# Patient Record
Sex: FEMALE | Race: WHITE | Employment: OTHER | URBAN - METROPOLITAN AREA
[De-identification: names, ages, dates, MRNs, and addresses within clinical notes are randomized per-mention and may not be internally consistent; named-entity substitution may affect disease eponyms.]

---

## 2017-06-17 ENCOUNTER — HOSPITAL ENCOUNTER (OUTPATIENT)
Dept: INFUSION THERAPY | Age: 67
Discharge: HOME | End: 2017-06-17
Attending: FAMILY MEDICINE | Admitting: FAMILY MEDICINE

## 2017-06-17 VITALS
HEART RATE: 82 BPM | DIASTOLIC BLOOD PRESSURE: 72 MMHG | RESPIRATION RATE: 16 BRPM | TEMPERATURE: 98.6 F | SYSTOLIC BLOOD PRESSURE: 125 MMHG

## 2017-06-17 LAB
BASOPHILS ABSOLUTE: 0.1 K/CU MM
BASOPHILS RELATIVE PERCENT: 1.4 % (ref 0–1)
DIFFERENTIAL TYPE: ABNORMAL
EOSINOPHILS ABSOLUTE: 0.2 K/CU MM
EOSINOPHILS RELATIVE PERCENT: 3.2 % (ref 0–3)
HCT VFR BLD CALC: 32.3 % (ref 37–47)
HEMOGLOBIN: 9.2 GM/DL (ref 12.5–16)
IMMATURE NEUTROPHIL %: 0.4 % (ref 0–0.43)
LYMPHOCYTES ABSOLUTE: 1.7 K/CU MM
LYMPHOCYTES RELATIVE PERCENT: 23.2 % (ref 24–44)
MCH RBC QN AUTO: 19.5 PG (ref 27–31)
MCHC RBC AUTO-ENTMCNC: 28.5 % (ref 32–36)
MCV RBC AUTO: 68.6 FL (ref 78–100)
MONOCYTES ABSOLUTE: 0.7 K/CU MM
MONOCYTES RELATIVE PERCENT: 9.4 % (ref 0–4)
NUCLEATED RBC %: 0 %
PDW BLD-RTO: 26.3 % (ref 11.7–14.9)
PLATELET # BLD: 316 K/CU MM (ref 140–440)
RBC # BLD: 4.71 M/CU MM (ref 4.2–5.4)
SEGMENTED NEUTROPHILS ABSOLUTE COUNT: 4.4 K/CU MM
SEGMENTED NEUTROPHILS RELATIVE PERCENT: 62.4 % (ref 36–66)
TOTAL IMMATURE NEUTOROPHIL: 0.03 K/CU MM
TOTAL NUCLEATED RBC: 0 K/CU MM
WBC # BLD: 7.1 K/CU MM (ref 4–10.5)

## 2017-06-17 RX ORDER — FUROSEMIDE 20 MG/1
20 TABLET ORAL PRN
COMMUNITY
End: 2022-03-18

## 2017-06-17 RX ORDER — GABAPENTIN 300 MG/1
300 CAPSULE ORAL 3 TIMES DAILY
COMMUNITY

## 2017-06-17 RX ORDER — 0.9 % SODIUM CHLORIDE 0.9 %
250 INTRAVENOUS SOLUTION INTRAVENOUS ONCE
Status: DISCONTINUED | OUTPATIENT
Start: 2017-06-17 | End: 2017-06-18 | Stop reason: HOSPADM

## 2017-06-17 RX ORDER — SIMVASTATIN 40 MG
40 TABLET ORAL NIGHTLY
COMMUNITY

## 2017-06-17 ASSESSMENT — PAIN SCALES - GENERAL: PAINLEVEL_OUTOF10: 0

## 2018-02-08 ENCOUNTER — HOSPITAL ENCOUNTER (OUTPATIENT)
Dept: OTHER | Age: 68
Discharge: HOME | End: 2018-02-08
Attending: INTERNAL MEDICINE | Admitting: INTERNAL MEDICINE

## 2018-02-08 LAB
FERRITIN: 38 NG/ML (ref 15–150)
IRON: 29 UG/DL (ref 37–145)
PCT TRANSFERRIN: 9 % (ref 10–44)
TOTAL IRON BINDING CAPACITY: 323 UG/DL (ref 250–450)
UNSATURATED IRON BINDING CAPACITY: 294 UG/DL (ref 110–370)

## 2018-06-11 ENCOUNTER — HOSPITAL ENCOUNTER (OUTPATIENT)
Dept: OTHER | Age: 68
Discharge: HOME | End: 2018-06-11
Attending: INTERNAL MEDICINE | Admitting: INTERNAL MEDICINE

## 2018-06-11 LAB
FERRITIN: 35 NG/ML (ref 15–150)
IRON: 61 UG/DL (ref 37–145)
PCT TRANSFERRIN: 22 % (ref 10–44)
TOTAL IRON BINDING CAPACITY: 275 UG/DL (ref 250–450)
UNSATURATED IRON BINDING CAPACITY: 214 UG/DL (ref 110–370)
VITAMIN B-12: 225.2 PG/ML (ref 211–911)

## 2019-04-02 ENCOUNTER — HOSPITAL ENCOUNTER (OUTPATIENT)
Dept: INFUSION THERAPY | Age: 69
Setting detail: INFUSION SERIES
Discharge: HOME | End: 2019-04-02
Payer: MEDICARE

## 2019-04-02 VITALS
RESPIRATION RATE: 16 BRPM | HEIGHT: 65 IN | BODY MASS INDEX: 34.99 KG/M2 | WEIGHT: 210 LBS | TEMPERATURE: 98.2 F | SYSTOLIC BLOOD PRESSURE: 139 MMHG | OXYGEN SATURATION: 93 % | HEART RATE: 80 BPM | DIASTOLIC BLOOD PRESSURE: 76 MMHG

## 2019-04-02 PROCEDURE — 86922 COMPATIBILITY TEST ANTIGLOB: CPT

## 2019-04-02 PROCEDURE — 86850 RBC ANTIBODY SCREEN: CPT

## 2019-04-02 PROCEDURE — 36430 TRANSFUSION BLD/BLD COMPNT: CPT

## 2019-04-02 PROCEDURE — 86901 BLOOD TYPING SEROLOGIC RH(D): CPT

## 2019-04-02 PROCEDURE — 2580000003 HC RX 258: Performed by: FAMILY MEDICINE

## 2019-04-02 PROCEDURE — 86900 BLOOD TYPING SEROLOGIC ABO: CPT

## 2019-04-02 PROCEDURE — P9016 RBC LEUKOCYTES REDUCED: HCPCS

## 2019-04-02 PROCEDURE — 99211 OFF/OP EST MAY X REQ PHY/QHP: CPT

## 2019-04-02 RX ORDER — 0.9 % SODIUM CHLORIDE 0.9 %
250 INTRAVENOUS SOLUTION INTRAVENOUS ONCE
Status: COMPLETED | OUTPATIENT
Start: 2019-04-02 | End: 2019-04-02

## 2019-04-02 RX ADMIN — SODIUM CHLORIDE 250 ML: 9 INJECTION, SOLUTION INTRAVENOUS at 10:00

## 2019-04-02 NOTE — DISCHARGE SUMMARY
Tolerated Transfusion well. Discharge instructions explained written copy given. Understanding verbalized. Discharged home. Down to private auto per self.

## 2019-04-02 NOTE — PLAN OF CARE
Ambulatory to unit room 5 for Blood Transfusion. Orientated to unit. Procedure and plan of care explained. Questions answered. Understanding verbalized.

## 2019-04-03 LAB
ABO/RH: NORMAL
ANTIBODY SCREEN: NEGATIVE
COMPONENT: NORMAL
COMPONENT: NORMAL
CROSSMATCH RESULT: NORMAL
CROSSMATCH RESULT: NORMAL
STATUS: NORMAL
STATUS: NORMAL
TRANSFUSION STATUS: NORMAL
TRANSFUSION STATUS: NORMAL
UNIT DIVISION: 0
UNIT DIVISION: 0
UNIT NUMBER: NORMAL
UNIT NUMBER: NORMAL

## 2019-07-29 ENCOUNTER — HOSPITAL ENCOUNTER (OUTPATIENT)
Age: 69
Setting detail: SPECIMEN
Discharge: HOME | End: 2019-07-29
Payer: MEDICARE

## 2019-07-29 LAB
FERRITIN: 9 NG/ML (ref 15–150)
IRON: 28 UG/DL (ref 37–145)
PCT TRANSFERRIN: 7 % (ref 10–44)
TOTAL IRON BINDING CAPACITY: 388 UG/DL (ref 250–450)
UNSATURATED IRON BINDING CAPACITY: 360 UG/DL (ref 110–370)
VITAMIN B-12: 274.6 PG/ML (ref 211–911)

## 2019-07-29 PROCEDURE — 82607 VITAMIN B-12: CPT

## 2019-07-29 PROCEDURE — 82728 ASSAY OF FERRITIN: CPT

## 2019-07-29 PROCEDURE — 83550 IRON BINDING TEST: CPT

## 2019-07-29 PROCEDURE — 83540 ASSAY OF IRON: CPT

## 2019-10-22 ENCOUNTER — HOSPITAL ENCOUNTER (OUTPATIENT)
Age: 69
Setting detail: SPECIMEN
Discharge: HOME | End: 2019-10-22
Payer: MEDICARE

## 2019-10-22 LAB
FERRITIN: 33 NG/ML (ref 15–150)
IRON: 68 UG/DL (ref 37–145)
PCT TRANSFERRIN: 23 % (ref 10–44)
TOTAL IRON BINDING CAPACITY: 301 UG/DL (ref 250–450)
UNSATURATED IRON BINDING CAPACITY: 233 UG/DL (ref 110–370)
VITAMIN B-12: 223.4 PG/ML (ref 211–911)

## 2019-10-22 PROCEDURE — 83540 ASSAY OF IRON: CPT

## 2019-10-22 PROCEDURE — 83550 IRON BINDING TEST: CPT

## 2019-10-22 PROCEDURE — 82728 ASSAY OF FERRITIN: CPT

## 2019-10-22 PROCEDURE — 82607 VITAMIN B-12: CPT

## 2019-12-03 ENCOUNTER — HOSPITAL ENCOUNTER (OUTPATIENT)
Age: 69
Setting detail: SPECIMEN
Discharge: HOME | End: 2019-12-03
Payer: MEDICARE

## 2019-12-03 LAB
FERRITIN: 528 NG/ML (ref 15–150)
IRON: 85 UG/DL (ref 37–145)
PCT TRANSFERRIN: 35 % (ref 10–44)
TOTAL IRON BINDING CAPACITY: 246 UG/DL (ref 250–450)
UNSATURATED IRON BINDING CAPACITY: 161 UG/DL (ref 110–370)
VITAMIN B-12: 191.8 PG/ML (ref 211–911)

## 2019-12-03 PROCEDURE — 82607 VITAMIN B-12: CPT

## 2019-12-03 PROCEDURE — 82728 ASSAY OF FERRITIN: CPT

## 2019-12-03 PROCEDURE — 83550 IRON BINDING TEST: CPT

## 2019-12-03 PROCEDURE — 83540 ASSAY OF IRON: CPT

## 2020-04-30 PROBLEM — D51.0 VITAMIN B12 DEFICIENCY ANEMIA DUE TO INTRINSIC FACTOR DEFICIENCY: Status: ACTIVE | Noted: 2020-04-30

## 2020-04-30 PROBLEM — D50.0 IRON DEFICIENCY ANEMIA SECONDARY TO BLOOD LOSS (CHRONIC): Status: ACTIVE | Noted: 2020-04-30

## 2020-04-30 PROBLEM — Z87.19 PERSONAL HISTORY OF OTHER DISEASES OF THE DIGESTIVE SYSTEM: Status: ACTIVE | Noted: 2020-04-30

## 2020-06-22 ENCOUNTER — HOSPITAL ENCOUNTER (OUTPATIENT)
Dept: INFUSION THERAPY | Age: 70
Discharge: HOME | End: 2020-06-22
Payer: MEDICARE

## 2020-06-22 LAB
BASOPHILS ABSOLUTE: 0 K/CU MM
BASOPHILS RELATIVE PERCENT: 0.6 % (ref 0–1)
DIFFERENTIAL TYPE: ABNORMAL
EOSINOPHILS ABSOLUTE: 0.2 K/CU MM
EOSINOPHILS RELATIVE PERCENT: 2.6 % (ref 0–3)
FERRITIN: 29 NG/ML (ref 15–150)
HCT VFR BLD CALC: 35.6 % (ref 37–47)
HEMOGLOBIN: 11.4 GM/DL (ref 12.5–16)
IRON: 60 UG/DL (ref 37–145)
LYMPHOCYTES ABSOLUTE: 1.2 K/CU MM
LYMPHOCYTES RELATIVE PERCENT: 19.3 % (ref 24–44)
MCH RBC QN AUTO: 27.1 PG (ref 27–31)
MCHC RBC AUTO-ENTMCNC: 32 % (ref 32–36)
MCV RBC AUTO: 84.6 FL (ref 78–100)
MONOCYTES ABSOLUTE: 0.6 K/CU MM
MONOCYTES RELATIVE PERCENT: 9.9 % (ref 0–4)
PCT TRANSFERRIN: 17 % (ref 10–44)
PDW BLD-RTO: 16.1 % (ref 11.7–14.9)
PLATELET # BLD: 227 K/CU MM (ref 140–440)
PMV BLD AUTO: 12.2 FL (ref 7.5–11.1)
RBC # BLD: 4.21 M/CU MM (ref 4.2–5.4)
SEGMENTED NEUTROPHILS ABSOLUTE COUNT: 4.2 K/CU MM
SEGMENTED NEUTROPHILS RELATIVE PERCENT: 67.6 % (ref 36–66)
TOTAL IRON BINDING CAPACITY: 346 UG/DL (ref 250–450)
UNSATURATED IRON BINDING CAPACITY: 286 UG/DL (ref 110–370)
VITAMIN B-12: 194.1 PG/ML (ref 211–911)
WBC # BLD: 6.3 K/CU MM (ref 4–10.5)

## 2020-06-22 PROCEDURE — 83550 IRON BINDING TEST: CPT

## 2020-06-22 PROCEDURE — 82607 VITAMIN B-12: CPT

## 2020-06-22 PROCEDURE — 36415 COLL VENOUS BLD VENIPUNCTURE: CPT

## 2020-06-22 PROCEDURE — 85025 COMPLETE CBC W/AUTO DIFF WBC: CPT

## 2020-06-22 PROCEDURE — 83540 ASSAY OF IRON: CPT

## 2020-06-22 PROCEDURE — 82728 ASSAY OF FERRITIN: CPT

## 2020-06-29 ENCOUNTER — HOSPITAL ENCOUNTER (OUTPATIENT)
Dept: INFUSION THERAPY | Age: 70
Discharge: HOME | End: 2020-06-29
Payer: MEDICARE

## 2020-06-29 PROCEDURE — 96372 THER/PROPH/DIAG INJ SC/IM: CPT

## 2020-06-29 PROCEDURE — 6360000002 HC RX W HCPCS

## 2020-06-29 RX ORDER — CYANOCOBALAMIN 1000 UG/ML
1000 INJECTION INTRAMUSCULAR; SUBCUTANEOUS ONCE
Status: DISCONTINUED | OUTPATIENT
Start: 2020-06-29 | End: 2020-06-30 | Stop reason: HOSPADM

## 2020-06-29 RX ORDER — CYANOCOBALAMIN 1000 UG/ML
INJECTION INTRAMUSCULAR; SUBCUTANEOUS
Status: DISCONTINUED
Start: 2020-06-29 | End: 2020-06-30 | Stop reason: HOSPADM

## 2020-07-06 ENCOUNTER — TELEPHONE (OUTPATIENT)
Dept: INFUSION THERAPY | Age: 70
End: 2020-07-06

## 2020-07-06 ENCOUNTER — HOSPITAL ENCOUNTER (OUTPATIENT)
Dept: INFUSION THERAPY | Age: 70
Discharge: HOME | End: 2020-07-06
Payer: MEDICARE

## 2020-07-06 PROCEDURE — 96372 THER/PROPH/DIAG INJ SC/IM: CPT

## 2020-07-06 PROCEDURE — 6360000002 HC RX W HCPCS

## 2020-07-06 RX ORDER — CYANOCOBALAMIN 1000 UG/ML
INJECTION INTRAMUSCULAR; SUBCUTANEOUS
Status: DISCONTINUED
Start: 2020-07-06 | End: 2020-07-07 | Stop reason: HOSPADM

## 2020-07-06 RX ORDER — CYANOCOBALAMIN 1000 UG/ML
1000 INJECTION INTRAMUSCULAR; SUBCUTANEOUS ONCE
Status: DISCONTINUED | OUTPATIENT
Start: 2020-07-06 | End: 2020-07-07 | Stop reason: HOSPADM

## 2020-07-13 ENCOUNTER — HOSPITAL ENCOUNTER (OUTPATIENT)
Dept: INFUSION THERAPY | Age: 70
Setting detail: INFUSION SERIES
Discharge: HOME | End: 2020-07-13
Payer: MEDICARE

## 2020-07-13 VITALS
HEART RATE: 72 BPM | TEMPERATURE: 97.5 F | WEIGHT: 200 LBS | DIASTOLIC BLOOD PRESSURE: 67 MMHG | OXYGEN SATURATION: 95 % | RESPIRATION RATE: 16 BRPM | SYSTOLIC BLOOD PRESSURE: 136 MMHG | BODY MASS INDEX: 34.15 KG/M2 | HEIGHT: 64 IN

## 2020-07-13 PROCEDURE — 96365 THER/PROPH/DIAG IV INF INIT: CPT

## 2020-07-13 PROCEDURE — 6360000002 HC RX W HCPCS: Performed by: INTERNAL MEDICINE

## 2020-07-13 PROCEDURE — 99211 OFF/OP EST MAY X REQ PHY/QHP: CPT

## 2020-07-13 PROCEDURE — 6360000002 HC RX W HCPCS

## 2020-07-13 PROCEDURE — 96374 THER/PROPH/DIAG INJ IV PUSH: CPT

## 2020-07-13 PROCEDURE — 2580000003 HC RX 258: Performed by: INTERNAL MEDICINE

## 2020-07-13 PROCEDURE — 96366 THER/PROPH/DIAG IV INF ADDON: CPT

## 2020-07-13 RX ORDER — DIPHENHYDRAMINE HYDROCHLORIDE 50 MG/ML
INJECTION INTRAMUSCULAR; INTRAVENOUS
Status: COMPLETED
Start: 2020-07-13 | End: 2020-07-13

## 2020-07-13 RX ORDER — DIPHENHYDRAMINE HYDROCHLORIDE 50 MG/ML
25 INJECTION INTRAMUSCULAR; INTRAVENOUS ONCE
Status: COMPLETED | OUTPATIENT
Start: 2020-07-13 | End: 2020-07-13

## 2020-07-13 RX ADMIN — DIPHENHYDRAMINE HYDROCHLORIDE 25 MG: 50 INJECTION INTRAMUSCULAR; INTRAVENOUS at 09:35

## 2020-07-13 RX ADMIN — SODIUM CHLORIDE 1000 MG: 9 INJECTION, SOLUTION INTRAVENOUS at 09:40

## 2020-07-13 NOTE — PROGRESS NOTES
Pt taken to room 04 FOR INFED. Pt oriented to room, call light, bed/chair controls, TV, pt voiced understanding. Plan of care explained to pt, pt voiced understanding.

## 2020-07-13 NOTE — PROGRESS NOTES
Tolerated infusion well. Reviewed discharge instruction, voiced understanding. Copies of AVS given. Pt discharged home. Pt to exit via wheelchair.     Orders Placed This Encounter   Medications    iron dextran complex (INFED) 1,000 mg in sodium chloride 0.9 % 500 mL IVPB    diphenhydrAMINE (BENADRYL) injection 25 mg    diphenhydrAMINE (BENADRYL) 50 MG/ML injection     Denia Rijoas: cabinet override

## 2020-07-20 ENCOUNTER — HOSPITAL ENCOUNTER (OUTPATIENT)
Dept: INFUSION THERAPY | Age: 70
Discharge: HOME | End: 2020-07-20
Payer: MEDICARE

## 2020-07-20 PROCEDURE — 96372 THER/PROPH/DIAG INJ SC/IM: CPT

## 2020-07-20 PROCEDURE — 6360000002 HC RX W HCPCS: Performed by: INTERNAL MEDICINE

## 2020-07-20 RX ORDER — CYANOCOBALAMIN 1000 UG/ML
1000 INJECTION INTRAMUSCULAR; SUBCUTANEOUS ONCE
Status: DISCONTINUED | OUTPATIENT
Start: 2020-07-20 | End: 2020-07-21 | Stop reason: HOSPADM

## 2020-08-14 RX ORDER — CYANOCOBALAMIN 1000 UG/ML
1000 INJECTION INTRAMUSCULAR; SUBCUTANEOUS ONCE
Status: CANCELLED
Start: 2020-08-17

## 2020-09-08 ENCOUNTER — APPOINTMENT (OUTPATIENT)
Dept: INFUSION THERAPY | Age: 70
End: 2020-09-08
Payer: MEDICARE

## 2020-09-21 NOTE — PROGRESS NOTES
Patient Name: Joshua Alejandro  Patient : 1950  Patient MRN: [id number]     Primary Oncologist: Kamilla Tan MD  Referring Provider: Marcela Schneider MD       Date of Service: 2020      Chief Complaint:   Chief Complaint   Patient presents with    Discuss Labs        Problem List: Patient Active Problem List:     Iron deficiency anemia secondary to blood loss (chronic)     Vitamin B12 deficiency anemia     Personal history of other diseases of the digestive system        HPI: Theodora Hodges is a pleasant 62-year-old  female patient with an underlying history of anemia of iron deficiency, who was referred for consideration of iron infusion. She received parenteral iron in  without any problems. She was seen by Dr. Erin Neves previously and had colonoscopy and upper endoscopic study more than five years ago. She denied any melena or hematochezia. She received blood transfusion in 2017. At that time, hemoglobin was 7. Stool occult was several years back. I reviewed her blood test record. In 2017, her CMP was within normal limits. White cell count was 6.2, hemoglobin 7.2, hematocrit 25, MCV 59.2, platelet 276. Ferritin was 6, B12 266, TSH 2.28. She tried iron pills, which caused her to have irritation of the stomach and constipation. Her blood test in 2017 revealed iron 21. BMP was within normal limits. White cell count was 5.4, hemoglobin 9.2, hematocrit 29.7, MCV 71.1, platelet 986, TSH 9.688. Ferritin was 9 and B12 was 258. She had left cataract surgery on 2017. She had right eye cataract surgeryIn 2018 and hematoma below the right eye. She received iron infusion in 2018 with mild improvement of hemoglobin and ferritin. Iron saturation remained low and she still has the symptom of the anemia. She is scheduled for the upper and lower endoscopic study in 2018 and possible capsule endoscopic study. She is on omeprazole for acid reflux.  Blood tests in February 2018 revealed hemoglobin 9.6 MCV 81.5 ferritin 38. B12 was borderline therefore I started B12 injection SQ monthly. She could not tolerate iron by mouth. In June 18, 2018 anemia was corrected. She had upper, lower endoscopic study and capsule endoscopic study in first quarter of 2018. She was prescribed Iron pill causing stomach discomfort. I advise to take QOD. She was found to have hiatal hernia and small gastric ulcer. She is on omeprazole. I gave refill of B-12 injection. Labs in May 2019 showed ferritin at 11, TSH 0.377, white cell 4.0, hemoglobin 10.7, MCV 71.6, platelet 081. CMP was unremarkable. She received blood transfusion in January 2019. She stopped B12 injection in April or March 2019. She takes iron pill every 2 days due to constipation. Reportedly mammogram in December 2018 was benign. PHQ score was high because of low iron. She is on antidepressant. She had Infed in August 2019. Ferritin improved to ~30 and Hg ~13.6. She takes prenatal vitamin with Iron every other day. Labs in December 2019 were reviewed. Hg and Ferritin improved. She started B-12 injection in December 2019. On September 23, 2020 she came in for follow-up visit. Reports she has resumed B12. She can not tolerate oral iron. She has not followed up with GI. We will recheck iron studies today. She denies any acute pain or depression. No nausea, vomiting or diarrhea. No melena or hematochezia. No dysuria or hematuria. No loss of taste or smell. Past Medical History  Allergic rhinitis, anemia of iron deficiency, diverticulosis of the colon, status post colonoscopy in December 2011, fibromyalgia, GERD, dyslipidemia, hypothyroidism, vitamin D deficiency. Surgical History   Breast biopsy, cholecystectomy in 2006 by Dr. Siobhan Hernandez.      Allergies  No known medication allergies    Previous Therapies:       Current Therapy:  VITAMIN B12: CYANOCOBALAMIN MONTHLY      Interval History:  Reports energy is fair, she can complete ADL. She denies easy bleeding or bruising, no melena or hematochezia. She denies pain. Review of Systems: \"Per interval history; otherwise 10 point ROS is negative. \"     Vital Signs:  /81 (Site: Left Upper Arm, Position: Sitting, Cuff Size: Large Adult)   Pulse 81   Temp 97.7 °F (36.5 °C) (Infrared)   Resp 16   Ht 5' 4.5\" (1.638 m)   Wt 213 lb 3.2 oz (96.7 kg)   SpO2 98%   BMI 36.03 kg/m²     Physical Exam:    CONSTITUTIONAL: awake, alert, cooperative, no apparent distress   EYES: pupils equal, round and reactive to light, sclera clear and conjunctiva normal  ENT: Normocephalic, without obvious abnormality, atraumatic  NECK: supple, symmetrical, no jugular venous distension and no carotid bruits   HEMATOLOGIC/LYMPHATIC: no cervical, supraclavicular or axillary lymphadenopathy   LUNGS: no increased work of breathing and clear to auscultation   CARDIOVASCULAR: regular rate and rhythm, normal S1 and S2, no murmur noted  ABDOMEN: normal bowel sounds x 4, soft, non-distended, non-tender, no masses palpated, no hepatosplenomegaly   MUSCULOSKELETAL: full range of motion noted, tone is normal  NEUROLOGIC: awake, alert, oriented to name, place and time. Motor skills grossly intact. SKIN: Normal skin color, texture, turgor and no jaundice.  appears intact   EXTREMITIES: no LE edema     Labs:    Hematology:  Lab Results   Component Value Date    WBC 5.8 09/22/2020    RBC 3.99 (L) 09/22/2020    HGB 11.7 (L) 09/22/2020    HCT 35.8 (L) 09/22/2020    MCV 89.7 09/22/2020    MCH 29.3 09/22/2020    MCHC 32.7 09/22/2020    RDW 16.4 (H) 09/22/2020     09/22/2020    MPV 11.9 (H) 09/22/2020    SEGSPCT 67.7 (H) 09/22/2020    EOSRELPCT 3.1 (H) 09/22/2020    BASOPCT 0.5 09/22/2020    LYMPHOPCT 20.5 (L) 09/22/2020    MONOPCT 8.2 (H) 09/22/2020    SEGSABS 3.9 09/22/2020    EOSABS 0.2 09/22/2020    BASOSABS 0.0 09/22/2020    LYMPHSABS 1.2 09/22/2020    MONOSABS 0.5 09/22/2020    DIFFTYPE AUTOMATED DIFFERENTIAL 09/22/2020     No results found for: ESR    Chemistry:  Lab Results   Component Value Date     09/22/2020    K 4.2 09/22/2020     09/22/2020    CO2 27 09/22/2020    BUN 12 09/22/2020    CREATININE 0.9 09/22/2020    GLUCOSE 96 09/22/2020    CALCIUM 9.6 09/22/2020    PROT 6.9 09/22/2020    LABALBU 4.7 09/22/2020    BILITOT 0.3 09/22/2020    ALKPHOS 93 09/22/2020    AST 25 09/22/2020    ALT 13 09/22/2020    LABGLOM >60 09/22/2020    GFRAA >60 09/22/2020     No results found for: MMA, LDH, HOMOCYSTEINE  No components found for: LD  No results found for: TSHHS, T4FREE, FT3    Immunology:  Lab Results   Component Value Date    PROT 6.9 09/22/2020     No results found for: James Mcintosh, KLFLCR  No results found for: B2M    Coagulation Panel:  No results found for: PROTIME, INR, APTT, DDIMER    Anemia Panel:  Lab Results   Component Value Date    HCSSLHLC59 194.1 (L) 06/22/2020       Tumor Markers:  No results found for: , CEA, , LABCA2, PSA    Imaging:      Pathology:      Observations:  Performance Status: 1  Symptoms, but ambulatory. Restricted in physically strenuous activity, but ambulatory and able to carry out work of a light or sedentary nature   Depression Status:   Cognitive Status: Oriented to person, time, and place     Assessment & Plan:  There are no diagnoses linked to this encounter. 1. She had upper and lower endoscopic study by Dr. Shaylee Brandon. She received iron infusions more than five years ago. She received INFeD in January 2018 with mild improvement of hemoglobin and ferritin. Upper and lower endoscopic study and capsule endoscopic study in March 2018 were performed by Dr Shaylee Brandon. Blood test in May 2019 was suggestive of low iron. She had Infed in August 2019. Ferritin is borderline low, Hg is improving. She had Infed in November 2019. Labs in December 2019 were reviewed. CBC and Ferritin improved. In June 2020 hemoglobin, B12 and iron drop.  She is agreeable to resume weekly B12 injection follow-up by monthly B12 injection. She is also agreeable to iron infusion. I referred back to GI for recurrent iron deficiency anemia. -- she has not followed up. I will recheck iron studies today. 2. Due to low B-12 level, she started on monthly SQ B-12 injection. She stopped B-12 injections in March or April 2019. B-12 in December 2019 was low. She resumed B-12 injection in December 2019. She stopped B12 injection. She is agreeable to resume B12 injections weekly x4 followed by monthly x6.     3. I advised her to keep her other age-appropriate cancer screening up to date. Mammogram in December 2018 was benign. 4. I advise to exercise. She could have chronic fatigue. RTC in 10 weeks with followup blood tests, including CBC and iron study/B-12. All of her questions have been answered for today. Recent imaging and labs were reviewed and discussed with the patient.       Electronically signed by CRISPIN Moss CNP on 9/21/20 at 8:42 AM ELIZABETH

## 2020-09-22 ENCOUNTER — OFFICE VISIT (OUTPATIENT)
Dept: ONCOLOGY | Age: 70
End: 2020-09-22
Payer: MEDICARE

## 2020-09-22 ENCOUNTER — HOSPITAL ENCOUNTER (OUTPATIENT)
Dept: INFUSION THERAPY | Age: 70
Discharge: HOME | End: 2020-09-22
Payer: MEDICARE

## 2020-09-22 VITALS
BODY MASS INDEX: 35.52 KG/M2 | OXYGEN SATURATION: 98 % | RESPIRATION RATE: 16 BRPM | DIASTOLIC BLOOD PRESSURE: 81 MMHG | HEIGHT: 65 IN | SYSTOLIC BLOOD PRESSURE: 138 MMHG | TEMPERATURE: 97.7 F | HEART RATE: 81 BPM | WEIGHT: 213.2 LBS

## 2020-09-22 DIAGNOSIS — D50.0 IRON DEFICIENCY ANEMIA SECONDARY TO BLOOD LOSS (CHRONIC): ICD-10-CM

## 2020-09-22 DIAGNOSIS — D51.0 VITAMIN B12 DEFICIENCY ANEMIA DUE TO INTRINSIC FACTOR DEFICIENCY: ICD-10-CM

## 2020-09-22 LAB
ALBUMIN SERPL-MCNC: 4.7 GM/DL (ref 3.4–5)
ALP BLD-CCNC: 93 IU/L (ref 40–129)
ALT SERPL-CCNC: 13 U/L (ref 10–40)
ANION GAP SERPL CALCULATED.3IONS-SCNC: 11 MMOL/L (ref 4–16)
AST SERPL-CCNC: 25 IU/L (ref 15–37)
BASOPHILS ABSOLUTE: 0 K/CU MM
BASOPHILS RELATIVE PERCENT: 0.5 % (ref 0–1)
BILIRUB SERPL-MCNC: 0.3 MG/DL (ref 0–1)
BUN BLDV-MCNC: 12 MG/DL (ref 6–23)
CALCIUM SERPL-MCNC: 9.6 MG/DL (ref 8.3–10.6)
CHLORIDE BLD-SCNC: 103 MMOL/L (ref 99–110)
CO2: 27 MMOL/L (ref 21–32)
CREAT SERPL-MCNC: 0.9 MG/DL (ref 0.6–1.1)
DIFFERENTIAL TYPE: ABNORMAL
EOSINOPHILS ABSOLUTE: 0.2 K/CU MM
EOSINOPHILS RELATIVE PERCENT: 3.1 % (ref 0–3)
FERRITIN: 32 NG/ML (ref 15–150)
GFR AFRICAN AMERICAN: >60 ML/MIN/1.73M2
GFR NON-AFRICAN AMERICAN: >60 ML/MIN/1.73M2
GLUCOSE BLD-MCNC: 96 MG/DL (ref 70–99)
HCT VFR BLD CALC: 35.8 % (ref 37–47)
HEMOGLOBIN: 11.7 GM/DL (ref 12.5–16)
IRON: 47 UG/DL (ref 37–145)
LYMPHOCYTES ABSOLUTE: 1.2 K/CU MM
LYMPHOCYTES RELATIVE PERCENT: 20.5 % (ref 24–44)
MCH RBC QN AUTO: 29.3 PG (ref 27–31)
MCHC RBC AUTO-ENTMCNC: 32.7 % (ref 32–36)
MCV RBC AUTO: 89.7 FL (ref 78–100)
MONOCYTES ABSOLUTE: 0.5 K/CU MM
MONOCYTES RELATIVE PERCENT: 8.2 % (ref 0–4)
PCT TRANSFERRIN: 16 % (ref 10–44)
PDW BLD-RTO: 16.4 % (ref 11.7–14.9)
PLATELET # BLD: 201 K/CU MM (ref 140–440)
PMV BLD AUTO: 11.9 FL (ref 7.5–11.1)
POTASSIUM SERPL-SCNC: 4.2 MMOL/L (ref 3.5–5.1)
RBC # BLD: 3.99 M/CU MM (ref 4.2–5.4)
SEGMENTED NEUTROPHILS ABSOLUTE COUNT: 3.9 K/CU MM
SEGMENTED NEUTROPHILS RELATIVE PERCENT: 67.7 % (ref 36–66)
SODIUM BLD-SCNC: 141 MMOL/L (ref 135–145)
TOTAL IRON BINDING CAPACITY: 296 UG/DL (ref 250–450)
TOTAL PROTEIN: 6.9 GM/DL (ref 6.4–8.2)
UNSATURATED IRON BINDING CAPACITY: 249 UG/DL (ref 110–370)
VITAMIN B-12: 259.5 PG/ML (ref 211–911)
WBC # BLD: 5.8 K/CU MM (ref 4–10.5)

## 2020-09-22 PROCEDURE — 1036F TOBACCO NON-USER: CPT | Performed by: NURSE PRACTITIONER

## 2020-09-22 PROCEDURE — 99211 OFF/OP EST MAY X REQ PHY/QHP: CPT

## 2020-09-22 PROCEDURE — 4040F PNEUMOC VAC/ADMIN/RCVD: CPT | Performed by: NURSE PRACTITIONER

## 2020-09-22 PROCEDURE — G8427 DOCREV CUR MEDS BY ELIG CLIN: HCPCS | Performed by: NURSE PRACTITIONER

## 2020-09-22 PROCEDURE — G8400 PT W/DXA NO RESULTS DOC: HCPCS | Performed by: NURSE PRACTITIONER

## 2020-09-22 PROCEDURE — 99214 OFFICE O/P EST MOD 30 MIN: CPT | Performed by: NURSE PRACTITIONER

## 2020-09-22 PROCEDURE — 83550 IRON BINDING TEST: CPT

## 2020-09-22 PROCEDURE — 83540 ASSAY OF IRON: CPT

## 2020-09-22 PROCEDURE — 1090F PRES/ABSN URINE INCON ASSESS: CPT | Performed by: NURSE PRACTITIONER

## 2020-09-22 PROCEDURE — 80053 COMPREHEN METABOLIC PANEL: CPT

## 2020-09-22 PROCEDURE — 3017F COLORECTAL CA SCREEN DOC REV: CPT | Performed by: NURSE PRACTITIONER

## 2020-09-22 PROCEDURE — 82607 VITAMIN B-12: CPT

## 2020-09-22 PROCEDURE — 1123F ACP DISCUSS/DSCN MKR DOCD: CPT | Performed by: NURSE PRACTITIONER

## 2020-09-22 PROCEDURE — G8417 CALC BMI ABV UP PARAM F/U: HCPCS | Performed by: NURSE PRACTITIONER

## 2020-09-22 PROCEDURE — 85025 COMPLETE CBC W/AUTO DIFF WBC: CPT

## 2020-09-22 PROCEDURE — 36415 COLL VENOUS BLD VENIPUNCTURE: CPT

## 2020-09-22 PROCEDURE — 82728 ASSAY OF FERRITIN: CPT

## 2020-09-22 ASSESSMENT — PATIENT HEALTH QUESTIONNAIRE - PHQ9
1. LITTLE INTEREST OR PLEASURE IN DOING THINGS: 0
2. FEELING DOWN, DEPRESSED OR HOPELESS: 0
SUM OF ALL RESPONSES TO PHQ QUESTIONS 1-9: 0
SUM OF ALL RESPONSES TO PHQ QUESTIONS 1-9: 0
SUM OF ALL RESPONSES TO PHQ9 QUESTIONS 1 & 2: 0

## 2020-09-22 NOTE — PROGRESS NOTES
MA Rooming Questions  Patient: Prince Becerra  MRN: [id number]    Date: 9/22/2020        1. Do you have any new issues?   no         2. Do you need any refills on medications?    no    3. Have you had any imaging done since your last visit?   no    4. Have you been hospitalized or seen in the emergency room since your last visit here?   no    5. Did the patient have a depression screening completed today?  Yes    PHQ-9 Total Score: 0 (9/22/2020  3:10 PM)       PHQ-9 Given to (if applicable):               PHQ-9 Score (if applicable):                     [] Positive     []  Negative              Does question #9 need addressed (if applicable)                     [] Yes    []  No               Will William MA

## 2020-09-23 ENCOUNTER — TELEPHONE (OUTPATIENT)
Dept: ONCOLOGY | Age: 70
End: 2020-09-23

## 2020-10-19 ENCOUNTER — TELEPHONE (OUTPATIENT)
Dept: ONCOLOGY | Age: 70
End: 2020-10-19

## 2020-10-19 NOTE — TELEPHONE ENCOUNTER
Patient called stating that she isn't feeling well and needs to have her labs drawn, appt scheduled for tomorrow 10/20 @ 1130, patient states understanding

## 2020-10-20 ENCOUNTER — HOSPITAL ENCOUNTER (OUTPATIENT)
Dept: INFUSION THERAPY | Age: 70
Discharge: HOME | End: 2020-10-20
Payer: MEDICARE

## 2020-10-20 DIAGNOSIS — D50.0 IRON DEFICIENCY ANEMIA SECONDARY TO BLOOD LOSS (CHRONIC): ICD-10-CM

## 2020-10-20 LAB
BASOPHILS ABSOLUTE: 0.1 K/CU MM
BASOPHILS RELATIVE PERCENT: 1 % (ref 0–1)
DIFFERENTIAL TYPE: ABNORMAL
EOSINOPHILS ABSOLUTE: 0.2 K/CU MM
EOSINOPHILS RELATIVE PERCENT: 4 % (ref 0–3)
FERRITIN: 24 NG/ML (ref 15–150)
HCT VFR BLD CALC: 37 % (ref 37–47)
HEMOGLOBIN: 11.9 GM/DL (ref 12.5–16)
IRON: 29 UG/DL (ref 37–145)
LYMPHOCYTES ABSOLUTE: 1 K/CU MM
LYMPHOCYTES RELATIVE PERCENT: 20.4 % (ref 24–44)
MCH RBC QN AUTO: 28.7 PG (ref 27–31)
MCHC RBC AUTO-ENTMCNC: 32.2 % (ref 32–36)
MCV RBC AUTO: 89.4 FL (ref 78–100)
MONOCYTES ABSOLUTE: 0.3 K/CU MM
MONOCYTES RELATIVE PERCENT: 6.3 % (ref 0–4)
PCT TRANSFERRIN: 10 % (ref 10–44)
PDW BLD-RTO: 15.3 % (ref 11.7–14.9)
PLATELET # BLD: 221 K/CU MM (ref 140–440)
PMV BLD AUTO: 11.7 FL (ref 7.5–11.1)
RBC # BLD: 4.14 M/CU MM (ref 4.2–5.4)
SEGMENTED NEUTROPHILS ABSOLUTE COUNT: 3.5 K/CU MM
SEGMENTED NEUTROPHILS RELATIVE PERCENT: 68.3 % (ref 36–66)
TOTAL IRON BINDING CAPACITY: 293 UG/DL (ref 250–450)
UNSATURATED IRON BINDING CAPACITY: 264 UG/DL (ref 110–370)
VITAMIN B-12: 282.8 PG/ML (ref 211–911)
WBC # BLD: 5.1 K/CU MM (ref 4–10.5)

## 2020-10-20 PROCEDURE — 85025 COMPLETE CBC W/AUTO DIFF WBC: CPT

## 2020-10-20 PROCEDURE — 82607 VITAMIN B-12: CPT

## 2020-10-20 PROCEDURE — 83540 ASSAY OF IRON: CPT

## 2020-10-20 PROCEDURE — 36415 COLL VENOUS BLD VENIPUNCTURE: CPT

## 2020-10-20 PROCEDURE — 83550 IRON BINDING TEST: CPT

## 2020-10-20 PROCEDURE — 82728 ASSAY OF FERRITIN: CPT

## 2020-10-22 ENCOUNTER — TELEPHONE (OUTPATIENT)
Dept: ONCOLOGY | Age: 70
End: 2020-10-22

## 2020-10-22 PROBLEM — K90.9 MALABSORPTION: Status: ACTIVE | Noted: 2020-10-22

## 2020-10-22 RX ORDER — DIPHENHYDRAMINE HYDROCHLORIDE 50 MG/ML
50 INJECTION INTRAMUSCULAR; INTRAVENOUS ONCE
Status: CANCELLED | OUTPATIENT
Start: 2020-10-29

## 2020-10-22 RX ORDER — SODIUM CHLORIDE 9 MG/ML
INJECTION, SOLUTION INTRAVENOUS CONTINUOUS
Status: CANCELLED | OUTPATIENT
Start: 2020-10-29

## 2020-10-22 RX ORDER — SODIUM CHLORIDE 0.9 % (FLUSH) 0.9 %
10 SYRINGE (ML) INJECTION PRN
Status: CANCELLED | OUTPATIENT
Start: 2020-10-29

## 2020-10-22 RX ORDER — SODIUM CHLORIDE 0.9 % (FLUSH) 0.9 %
5 SYRINGE (ML) INJECTION PRN
Status: CANCELLED | OUTPATIENT
Start: 2020-10-29

## 2020-10-22 RX ORDER — METHYLPREDNISOLONE SODIUM SUCCINATE 125 MG/2ML
125 INJECTION, POWDER, LYOPHILIZED, FOR SOLUTION INTRAMUSCULAR; INTRAVENOUS ONCE
Status: CANCELLED | OUTPATIENT
Start: 2020-10-29

## 2020-10-22 RX ORDER — HEPARIN SODIUM (PORCINE) LOCK FLUSH IV SOLN 100 UNIT/ML 100 UNIT/ML
500 SOLUTION INTRAVENOUS PRN
Status: CANCELLED | OUTPATIENT
Start: 2020-10-29

## 2020-10-22 RX ORDER — MEPERIDINE HYDROCHLORIDE 50 MG/ML
12.5 INJECTION INTRAMUSCULAR; INTRAVENOUS; SUBCUTANEOUS ONCE
Status: CANCELLED | OUTPATIENT
Start: 2020-10-29

## 2020-10-22 RX ORDER — EPINEPHRINE 1 MG/ML
0.3 INJECTION, SOLUTION, CONCENTRATE INTRAVENOUS PRN
Status: CANCELLED | OUTPATIENT
Start: 2020-10-29

## 2020-10-22 RX ORDER — ACETAMINOPHEN 325 MG/1
650 TABLET ORAL PRN
Status: CANCELLED
Start: 2020-10-29

## 2020-10-22 NOTE — TELEPHONE ENCOUNTER
Spoke to patient and reviewed results. Results reviewed with Dr Rox Lam. Dr Rox Lam ordered Infed. Informed patient that the order will be faxed to OP infusion at [facility]. Patient states that she has had iron in the past and is familiar with going to the OP infusion dept. Informed that this department will call her to schedule. Patient verbalized understanding of order and plan of care.

## 2020-10-30 ENCOUNTER — TELEPHONE (OUTPATIENT)
Dept: ONCOLOGY | Age: 70
End: 2020-10-30

## 2020-10-30 NOTE — TELEPHONE ENCOUNTER
Patient was calling about her iron infusion, states she hasn't heard from the outpatient infusion dept, called spoke to Jose Mcgowan states  patient is in their que and Vaibhav Barker will be calling to setup appt today

## 2020-11-04 ENCOUNTER — HOSPITAL ENCOUNTER (OUTPATIENT)
Dept: INFUSION THERAPY | Age: 70
Setting detail: INFUSION SERIES
Discharge: HOME | End: 2020-11-04
Payer: MEDICARE

## 2020-11-04 VITALS
SYSTOLIC BLOOD PRESSURE: 106 MMHG | RESPIRATION RATE: 16 BRPM | DIASTOLIC BLOOD PRESSURE: 50 MMHG | HEART RATE: 84 BPM | TEMPERATURE: 97 F | OXYGEN SATURATION: 98 %

## 2020-11-04 DIAGNOSIS — K90.9 INTESTINAL MALABSORPTION, UNSPECIFIED TYPE: Primary | ICD-10-CM

## 2020-11-04 DIAGNOSIS — D50.0 IRON DEFICIENCY ANEMIA SECONDARY TO BLOOD LOSS (CHRONIC): ICD-10-CM

## 2020-11-04 LAB
BASOPHILS ABSOLUTE: 0.1 K/CU MM
BASOPHILS RELATIVE PERCENT: 0.8 % (ref 0–1)
DIFFERENTIAL TYPE: ABNORMAL
EOSINOPHILS ABSOLUTE: 0.2 K/CU MM
EOSINOPHILS RELATIVE PERCENT: 1.7 % (ref 0–3)
GIANT PLATELETS: PRESENT
HCT VFR BLD CALC: 31.8 % (ref 37–47)
HEMOGLOBIN: 10.4 GM/DL (ref 12.5–16)
IMMATURE NEUTROPHIL %: 0.5 % (ref 0–0.43)
LYMPHOCYTES ABSOLUTE: 1.5 K/CU MM
LYMPHOCYTES RELATIVE PERCENT: 16.3 % (ref 24–44)
MCH RBC QN AUTO: 29.1 PG (ref 27–31)
MCHC RBC AUTO-ENTMCNC: 32.7 % (ref 32–36)
MCV RBC AUTO: 89.1 FL (ref 78–100)
MONOCYTES ABSOLUTE: 0.6 K/CU MM
MONOCYTES RELATIVE PERCENT: 7 % (ref 0–4)
PDW BLD-RTO: 14.7 % (ref 11.7–14.9)
PLATELET # BLD: 220 K/CU MM (ref 140–440)
PMV BLD AUTO: 12.4 FL (ref 7.5–11.1)
RBC # BLD: 3.57 M/CU MM (ref 4.2–5.4)
SEGMENTED NEUTROPHILS ABSOLUTE COUNT: 6.5 K/CU MM
SEGMENTED NEUTROPHILS RELATIVE PERCENT: 73.7 % (ref 36–66)
TOTAL IMMATURE NEUTOROPHIL: 0.04 K/CU MM
WBC # BLD: 8.9 K/CU MM (ref 4–10.5)

## 2020-11-04 PROCEDURE — 96375 TX/PRO/DX INJ NEW DRUG ADDON: CPT

## 2020-11-04 PROCEDURE — 96360 HYDRATION IV INFUSION INIT: CPT

## 2020-11-04 PROCEDURE — 96374 THER/PROPH/DIAG INJ IV PUSH: CPT

## 2020-11-04 PROCEDURE — 99211 OFF/OP EST MAY X REQ PHY/QHP: CPT

## 2020-11-04 PROCEDURE — 85025 COMPLETE CBC W/AUTO DIFF WBC: CPT

## 2020-11-04 PROCEDURE — 96366 THER/PROPH/DIAG IV INF ADDON: CPT

## 2020-11-04 PROCEDURE — 96365 THER/PROPH/DIAG IV INF INIT: CPT

## 2020-11-04 PROCEDURE — 2580000003 HC RX 258: Performed by: INTERNAL MEDICINE

## 2020-11-04 PROCEDURE — 6360000002 HC RX W HCPCS: Performed by: INTERNAL MEDICINE

## 2020-11-04 PROCEDURE — 6370000000 HC RX 637 (ALT 250 FOR IP): Performed by: INTERNAL MEDICINE

## 2020-11-04 PROCEDURE — 96361 HYDRATE IV INFUSION ADD-ON: CPT

## 2020-11-04 RX ORDER — SODIUM CHLORIDE 0.9 % (FLUSH) 0.9 %
10 SYRINGE (ML) INJECTION PRN
Status: DISCONTINUED | OUTPATIENT
Start: 2020-11-04 | End: 2020-11-04

## 2020-11-04 RX ORDER — ACETAMINOPHEN 325 MG/1
650 TABLET ORAL PRN
Status: CANCELLED
Start: 2020-11-11

## 2020-11-04 RX ORDER — ACETAMINOPHEN 325 MG/1
650 TABLET ORAL PRN
Status: DISCONTINUED | OUTPATIENT
Start: 2020-11-04 | End: 2020-11-04

## 2020-11-04 RX ORDER — SODIUM CHLORIDE 0.9 % (FLUSH) 0.9 %
10 SYRINGE (ML) INJECTION PRN
Status: CANCELLED | OUTPATIENT
Start: 2020-11-11

## 2020-11-04 RX ORDER — 0.9 % SODIUM CHLORIDE 0.9 %
250 INTRAVENOUS SOLUTION INTRAVENOUS ONCE
Status: DISCONTINUED | OUTPATIENT
Start: 2020-11-04 | End: 2020-11-04

## 2020-11-04 RX ORDER — DEXAMETHASONE SODIUM PHOSPHATE 10 MG/ML
10 INJECTION, SOLUTION INTRAMUSCULAR; INTRAVENOUS PRN
Status: CANCELLED
Start: 2020-11-11

## 2020-11-04 RX ORDER — DIPHENHYDRAMINE HYDROCHLORIDE 50 MG/ML
50 INJECTION INTRAMUSCULAR; INTRAVENOUS ONCE
Status: CANCELLED | OUTPATIENT
Start: 2020-11-11

## 2020-11-04 RX ORDER — SODIUM CHLORIDE 0.9 % (FLUSH) 0.9 %
5 SYRINGE (ML) INJECTION PRN
Status: CANCELLED | OUTPATIENT
Start: 2020-11-11

## 2020-11-04 RX ORDER — MEPERIDINE HYDROCHLORIDE 25 MG/ML
12.5 INJECTION INTRAMUSCULAR; INTRAVENOUS; SUBCUTANEOUS ONCE
Status: CANCELLED | OUTPATIENT
Start: 2020-11-11

## 2020-11-04 RX ORDER — SODIUM CHLORIDE 9 MG/ML
INJECTION, SOLUTION INTRAVENOUS CONTINUOUS
Status: CANCELLED | OUTPATIENT
Start: 2020-11-11

## 2020-11-04 RX ORDER — 0.9 % SODIUM CHLORIDE 0.9 %
1000 INTRAVENOUS SOLUTION INTRAVENOUS ONCE
Status: COMPLETED | OUTPATIENT
Start: 2020-11-04 | End: 2020-11-04

## 2020-11-04 RX ORDER — EPINEPHRINE 1 MG/ML
0.3 INJECTION, SOLUTION, CONCENTRATE INTRAVENOUS PRN
Status: CANCELLED | OUTPATIENT
Start: 2020-11-11

## 2020-11-04 RX ORDER — DEXAMETHASONE SODIUM PHOSPHATE 10 MG/ML
10 INJECTION, SOLUTION INTRAMUSCULAR; INTRAVENOUS PRN
Status: DISCONTINUED | OUTPATIENT
Start: 2020-11-04 | End: 2020-11-04

## 2020-11-04 RX ORDER — METHYLPREDNISOLONE SODIUM SUCCINATE 125 MG/2ML
125 INJECTION, POWDER, LYOPHILIZED, FOR SOLUTION INTRAMUSCULAR; INTRAVENOUS ONCE
Status: CANCELLED | OUTPATIENT
Start: 2020-11-11

## 2020-11-04 RX ORDER — HEPARIN SODIUM (PORCINE) LOCK FLUSH IV SOLN 100 UNIT/ML 100 UNIT/ML
500 SOLUTION INTRAVENOUS PRN
Status: CANCELLED | OUTPATIENT
Start: 2020-11-11

## 2020-11-04 RX ADMIN — SODIUM CHLORIDE 1000 ML: 9 INJECTION, SOLUTION INTRAVENOUS at 09:15

## 2020-11-04 RX ADMIN — SODIUM CHLORIDE, PRESERVATIVE FREE 10 ML: 5 INJECTION INTRAVENOUS at 08:45

## 2020-11-04 RX ADMIN — SODIUM CHLORIDE, PRESERVATIVE FREE 10 ML: 5 INJECTION INTRAVENOUS at 08:46

## 2020-11-04 RX ADMIN — SODIUM CHLORIDE, PRESERVATIVE FREE 10 ML: 5 INJECTION INTRAVENOUS at 13:23

## 2020-11-04 RX ADMIN — ACETAMINOPHEN 650 MG: 325 TABLET ORAL at 08:45

## 2020-11-04 RX ADMIN — SODIUM CHLORIDE 1000 MG: 9 INJECTION, SOLUTION INTRAVENOUS at 09:01

## 2020-11-04 RX ADMIN — DEXAMETHASONE SODIUM PHOSPHATE 10 MG: 10 INJECTION, SOLUTION INTRAMUSCULAR; INTRAVENOUS at 08:46

## 2020-11-04 ASSESSMENT — PAIN SCALES - GENERAL: PAINLEVEL_OUTOF10: 0

## 2020-11-04 NOTE — PROGRESS NOTES
Tolerated infusion well. Reviewed discharge instruction, voiced understanding. Copies of AVS given. Pt discharged home. Pt to exit via wheelchair.     Orders Placed This Encounter   Medications    DISCONTD: iron dextran complex (INFED) 25 mg in sodium chloride 0.9 % 50 mL (test dose) IVPB    iron dextran complex (INFED) 1,000 mg in sodium chloride 0.9 % 500 mL IVPB    sodium chloride flush 0.9 % injection 10 mL    acetaminophen (TYLENOL) tablet 650 mg    dexamethasone (PF) (DECADRON) injection 10 mg    DISCONTD: 0.9 % sodium chloride bolus    0.9 % sodium chloride bolus

## 2020-12-01 NOTE — PROGRESS NOTES
injection SQ monthly. She could not tolerate iron by mouth. In June 18, 2018 anemia was corrected. She had upper, lower endoscopic study and capsule endoscopic study in first quarter of 2018. She was prescribed Iron pill causing stomach discomfort. I advise to take QOD. She was found to have hiatal hernia and small gastric ulcer. She is on omeprazole. I gave refill of B-12 injection. Labs in May 2019 showed ferritin at 11, TSH 0.377, white cell 4.0, hemoglobin 10.7, MCV 71.6, platelet 176. CMP was unremarkable. She received blood transfusion in January 2019. She stopped B12 injection in April or March 2019. She takes iron pill every 2 days due to constipation. Reportedly mammogram in December 2018 was benign. PHQ score was high because of low iron. She is on antidepressant. She had Infed in August 2019. Ferritin improved to ~30 and Hg ~13.6. She takes prenatal vitamin with Iron every other day. Labs in December 2019 were reviewed. Hg and Ferritin improved. She started B-12 injection in December 2019. Reports she has resumed B12. On December 3, 2020 she came in for follow-up visit. She received INFeD in November 2020. She is agreeable to resume the B12 shot again. She has been followed by Dr. Trinda Fabry for the chronic knee pain. She received cortical cortisone shot and gel shot. I will check iron studies and B12 today. She has chronic back pain. She can not tolerate oral iron. She has not followed up with GI. We will recheck iron studies today. She denies any acute pain or depression. No nausea, vomiting or diarrhea. No melena or hematochezia. No dysuria or hematuria. No loss of taste or smell. Past Medical History  Allergic rhinitis, anemia of iron deficiency, diverticulosis of the colon, status post colonoscopy in December 2011, fibromyalgia, GERD, dyslipidemia, hypothyroidism, vitamin D deficiency. Surgical History   Breast biopsy, cholecystectomy in 2006 by Dr. Kenny Roman. Current Therapy:  VITAMIN B12: CYANOCOBALAMIN MONTHLY      Interval History:  Reports energy is fair, she can complete ADL. She denies easy bleeding or bruising, no melena or hematochezia. She denies pain. Review of Systems: \"Per interval history; otherwise 10 point ROS is negative. \"     Vital Signs:  /66 (Site: Left Upper Arm, Position: Sitting, Cuff Size: Large Adult)   Pulse 88   Temp 97.3 °F (36.3 °C) (Infrared)   Resp 18   Ht 5' 4.5\" (1.638 m)   Wt 215 lb 6.4 oz (97.7 kg)   SpO2 96%   BMI 36.40 kg/m²     Physical Exam:    CONSTITUTIONAL: awake, alert, cooperative, no apparent distress   EYES: pupils equal, round and reactive to light, sclera clear and conjunctiva normal  ENT: Normocephalic, without obvious abnormality, atraumatic  NECK: supple, symmetrical, no jugular venous distension and no carotid bruits   HEMATOLOGIC/LYMPHATIC: no cervical, supraclavicular or axillary lymphadenopathy   LUNGS: no increased work of breathing and clear to auscultation   CARDIOVASCULAR: regular rate and rhythm, normal S1 and S2, no murmur noted  ABDOMEN: normal bowel sounds x 4, soft, non-distended, non-tender, no masses palpated, no hepatosplenomegaly   MUSCULOSKELETAL: full range of motion noted, tone is normal  NEUROLOGIC: awake, alert, oriented to name, place and time. Motor skills grossly intact. SKIN: Normal skin color, texture, turgor and no jaundice. appears intact   EXTREMITIES: no LE edema. No cyanosis.      Labs:    Hematology:  Lab Results   Component Value Date    WBC 8.9 11/04/2020    RBC 3.57 (L) 11/04/2020    HGB 10.4 (L) 11/04/2020    HCT 31.8 (L) 11/04/2020    MCV 89.1 11/04/2020    MCH 29.1 11/04/2020    MCHC 32.7 11/04/2020    RDW 14.7 11/04/2020     11/04/2020    MPV 12.4 (H) 11/04/2020    SEGSPCT 73.7 (H) 11/04/2020    EOSRELPCT 1.7 11/04/2020    BASOPCT 0.8 11/04/2020    LYMPHOPCT 16.3 (L) 11/04/2020    MONOPCT 7.0 (H) 11/04/2020    SEGSABS 6.5 11/04/2020    EOSABS 0.2 11/04/2020    BASOSABS 0.1 11/04/2020    LYMPHSABS 1.5 11/04/2020    MONOSABS 0.6 11/04/2020    DIFFTYPE  11/04/2020     AUTOMATED DIFF RESULTS CONFIRMED BY SMEAR REVIEW  AUTOMATED DIFFERENTIAL       Chemistry:  Lab Results   Component Value Date     09/22/2020    K 4.2 09/22/2020     09/22/2020    CO2 27 09/22/2020    BUN 12 09/22/2020    CREATININE 0.9 09/22/2020    GLUCOSE 96 09/22/2020    CALCIUM 9.6 09/22/2020    PROT 6.9 09/22/2020    LABALBU 4.7 09/22/2020    BILITOT 0.3 09/22/2020    ALKPHOS 93 09/22/2020    AST 25 09/22/2020    ALT 13 09/22/2020    LABGLOM >60 09/22/2020    GFRAA >60 09/22/2020     Immunology:  Lab Results   Component Value Date    PROT 6.9 09/22/2020     Anemia Panel:  Lab Results   Component Value Date    VMGJUFBW19 282.8 10/20/2020     Imaging:      Pathology:      Observations:  Performance Status: 1  Symptoms, but ambulatory. Restricted in physically strenuous activity, but ambulatory and able to carry out work of a light or sedentary nature   Depression Status:   Cognitive Status: Oriented to person, time, and place     Assessment & Plan:    1. She had upper and lower endoscopic study by Dr. Emelia Tubbs. She received iron infusions more than five years ago. She received INFeD in January 2018 with mild improvement of hemoglobin and ferritin. Upper and lower endoscopic study and capsule endoscopic study in March 2018 were performed by Dr Emelia Tubbs. Blood test in May 2019 was suggestive of low iron. She had Infed in August 2019. Ferritin is borderline low, Hg is improving. She had Infed in November 2019. Labs in December 2019 were reviewed. CBC and Ferritin improved. In June 2020 hemoglobin, B12 and iron drop. She did not come for the B12 injection for a while. She is agreeable to resume B12 shot December 2020. She received iron infusion in November 2020. I will recheck CBC, B12 and iron studies today. 2. Due to low B-12 level, she started on monthly SQ B-12 injection. She stopped B-12 injections in March or April 2019. B-12 in December 2019 was low. She resumed B-12 injection in December 2019. She will resume B12 injection in December 2020.    3. I advised her to keep her other age-appropriate cancer screening up to date. Mammogram in December 2018 was benign. 4. I advise to exercise. She could have chronic fatigue. RTC in 12 weeks sooner. I will check labs 1 week prior to office visit. All of her questions have been answered for today. Recent imaging and labs were reviewed and discussed with the patient.

## 2020-12-03 ENCOUNTER — OFFICE VISIT (OUTPATIENT)
Dept: ONCOLOGY | Age: 70
End: 2020-12-03
Payer: MEDICARE

## 2020-12-03 ENCOUNTER — HOSPITAL ENCOUNTER (OUTPATIENT)
Dept: INFUSION THERAPY | Age: 70
Discharge: HOME | End: 2020-12-03
Payer: MEDICARE

## 2020-12-03 VITALS
RESPIRATION RATE: 18 BRPM | SYSTOLIC BLOOD PRESSURE: 137 MMHG | HEIGHT: 65 IN | OXYGEN SATURATION: 96 % | WEIGHT: 215.4 LBS | HEART RATE: 88 BPM | TEMPERATURE: 97.3 F | BODY MASS INDEX: 35.89 KG/M2 | DIASTOLIC BLOOD PRESSURE: 66 MMHG

## 2020-12-03 DIAGNOSIS — D64.9 ANEMIA, UNSPECIFIED TYPE: ICD-10-CM

## 2020-12-03 DIAGNOSIS — D51.0 VITAMIN B12 DEFICIENCY ANEMIA DUE TO INTRINSIC FACTOR DEFICIENCY: Primary | ICD-10-CM

## 2020-12-03 LAB
BASOPHILS ABSOLUTE: 0.1 K/CU MM
BASOPHILS RELATIVE PERCENT: 1 % (ref 0–1)
DIFFERENTIAL TYPE: ABNORMAL
EOSINOPHILS ABSOLUTE: 0.2 K/CU MM
EOSINOPHILS RELATIVE PERCENT: 3.3 % (ref 0–3)
FERRITIN: 61 NG/ML (ref 15–150)
FOLATE: 5.5 NG/ML (ref 3.1–17.5)
HCT VFR BLD CALC: 27.4 % (ref 37–47)
HEMOGLOBIN: 8.6 GM/DL (ref 12.5–16)
IRON: 36 UG/DL (ref 37–145)
LYMPHOCYTES ABSOLUTE: 1 K/CU MM
LYMPHOCYTES RELATIVE PERCENT: 19.7 % (ref 24–44)
MCH RBC QN AUTO: 29.6 PG (ref 27–31)
MCHC RBC AUTO-ENTMCNC: 31.4 % (ref 32–36)
MCV RBC AUTO: 94.2 FL (ref 78–100)
MONOCYTES ABSOLUTE: 0.4 K/CU MM
MONOCYTES RELATIVE PERCENT: 8 % (ref 0–4)
PCT TRANSFERRIN: 14 % (ref 10–44)
PDW BLD-RTO: 17.7 % (ref 11.7–14.9)
PLATELET # BLD: 264 K/CU MM (ref 140–440)
PMV BLD AUTO: 11 FL (ref 7.5–11.1)
RBC # BLD: 2.91 M/CU MM (ref 4.2–5.4)
SEGMENTED NEUTROPHILS ABSOLUTE COUNT: 3.3 K/CU MM
SEGMENTED NEUTROPHILS RELATIVE PERCENT: 68 % (ref 36–66)
TOTAL IRON BINDING CAPACITY: 266 UG/DL (ref 250–450)
UNSATURATED IRON BINDING CAPACITY: 230 UG/DL (ref 110–370)
VITAMIN B-12: 226.7 PG/ML (ref 211–911)
WBC # BLD: 4.9 K/CU MM (ref 4–10.5)

## 2020-12-03 PROCEDURE — 1036F TOBACCO NON-USER: CPT | Performed by: INTERNAL MEDICINE

## 2020-12-03 PROCEDURE — 83540 ASSAY OF IRON: CPT

## 2020-12-03 PROCEDURE — 82746 ASSAY OF FOLIC ACID SERUM: CPT

## 2020-12-03 PROCEDURE — G8417 CALC BMI ABV UP PARAM F/U: HCPCS | Performed by: INTERNAL MEDICINE

## 2020-12-03 PROCEDURE — 85025 COMPLETE CBC W/AUTO DIFF WBC: CPT

## 2020-12-03 PROCEDURE — G8400 PT W/DXA NO RESULTS DOC: HCPCS | Performed by: INTERNAL MEDICINE

## 2020-12-03 PROCEDURE — 99211 OFF/OP EST MAY X REQ PHY/QHP: CPT

## 2020-12-03 PROCEDURE — 4040F PNEUMOC VAC/ADMIN/RCVD: CPT | Performed by: INTERNAL MEDICINE

## 2020-12-03 PROCEDURE — 6360000002 HC RX W HCPCS

## 2020-12-03 PROCEDURE — 99213 OFFICE O/P EST LOW 20 MIN: CPT | Performed by: INTERNAL MEDICINE

## 2020-12-03 PROCEDURE — 1090F PRES/ABSN URINE INCON ASSESS: CPT | Performed by: INTERNAL MEDICINE

## 2020-12-03 PROCEDURE — 83550 IRON BINDING TEST: CPT

## 2020-12-03 PROCEDURE — 1123F ACP DISCUSS/DSCN MKR DOCD: CPT | Performed by: INTERNAL MEDICINE

## 2020-12-03 PROCEDURE — 3017F COLORECTAL CA SCREEN DOC REV: CPT | Performed by: INTERNAL MEDICINE

## 2020-12-03 PROCEDURE — 36415 COLL VENOUS BLD VENIPUNCTURE: CPT

## 2020-12-03 PROCEDURE — G8427 DOCREV CUR MEDS BY ELIG CLIN: HCPCS | Performed by: INTERNAL MEDICINE

## 2020-12-03 PROCEDURE — 82607 VITAMIN B-12: CPT

## 2020-12-03 PROCEDURE — G8484 FLU IMMUNIZE NO ADMIN: HCPCS | Performed by: INTERNAL MEDICINE

## 2020-12-03 PROCEDURE — 82728 ASSAY OF FERRITIN: CPT

## 2020-12-03 RX ORDER — CYANOCOBALAMIN 1000 UG/ML
1000 INJECTION INTRAMUSCULAR; SUBCUTANEOUS ONCE
Status: CANCELLED
Start: 2020-12-31

## 2020-12-03 RX ORDER — CYANOCOBALAMIN 1000 UG/ML
INJECTION INTRAMUSCULAR; SUBCUTANEOUS
Status: DISCONTINUED
Start: 2020-12-03 | End: 2020-12-04 | Stop reason: HOSPADM

## 2020-12-03 RX ORDER — CYANOCOBALAMIN 1000 UG/ML
1000 INJECTION INTRAMUSCULAR; SUBCUTANEOUS ONCE
Status: DISCONTINUED
Start: 2020-12-03 | End: 2020-12-04 | Stop reason: HOSPADM

## 2020-12-03 NOTE — PROGRESS NOTES
MA Rooming Questions  Patient: Isabel Alvarez  MRN: [id number]    Date: 12/3/2020        1. Do you have any new issues?   no         2. Do you need any refills on medications?    no    3. Have you had any imaging done since your last visit? Yes- XRAY knee    4. Have you been hospitalized or seen in the emergency room since your last visit here?   no    5. Did the patient have a depression screening completed today?  No    No data recorded     PHQ-9 Given to (if applicable):               PHQ-9 Score (if applicable):                     [] Positive     []  Negative              Does question #9 need addressed (if applicable)                     [] Yes    []  No               Ana Zavaleta MA

## 2020-12-04 ENCOUNTER — CLINICAL DOCUMENTATION (OUTPATIENT)
Dept: ONCOLOGY | Age: 70
End: 2020-12-04

## 2020-12-04 NOTE — PROGRESS NOTES
On 12/03/20 B-12 injection 1000 mcg given SQ in R deltoid at 1445 per order. Tolerated without incident. Patient ID with name and date of birth. Patient scheduled to return for monthly injection and given appointment card.

## 2020-12-31 ENCOUNTER — TELEPHONE (OUTPATIENT)
Dept: INFUSION THERAPY | Age: 70
End: 2020-12-31

## 2020-12-31 NOTE — TELEPHONE ENCOUNTER
Returned call to pt & she requested to move her B12 injection to next wk as she has a slight cold; appt moved to 1/8/21 @ 2:45; pt voiced understanding.

## 2021-01-06 ENCOUNTER — TELEPHONE (OUTPATIENT)
Dept: INFUSION THERAPY | Age: 71
End: 2021-01-06

## 2021-01-06 NOTE — TELEPHONE ENCOUNTER
Returned call to pt & moved her B12 appt time on 1/8/21 to 9:30 as she has a conflict with another appt.

## 2021-01-08 ENCOUNTER — HOSPITAL ENCOUNTER (OUTPATIENT)
Dept: INFUSION THERAPY | Age: 71
Discharge: HOME | End: 2021-01-08
Payer: MEDICARE

## 2021-01-08 DIAGNOSIS — D51.0 VITAMIN B12 DEFICIENCY ANEMIA DUE TO INTRINSIC FACTOR DEFICIENCY: Primary | ICD-10-CM

## 2021-01-08 PROCEDURE — 6360000002 HC RX W HCPCS: Performed by: INTERNAL MEDICINE

## 2021-01-08 PROCEDURE — 96372 THER/PROPH/DIAG INJ SC/IM: CPT

## 2021-01-08 RX ORDER — CYANOCOBALAMIN 1000 UG/ML
1000 INJECTION INTRAMUSCULAR; SUBCUTANEOUS ONCE
Status: COMPLETED
Start: 2021-01-08 | End: 2021-01-08

## 2021-01-08 RX ORDER — CYANOCOBALAMIN 1000 UG/ML
1000 INJECTION INTRAMUSCULAR; SUBCUTANEOUS ONCE
Status: CANCELLED
Start: 2021-01-28

## 2021-01-08 RX ADMIN — CYANOCOBALAMIN 1000 MCG: 1000 INJECTION, SOLUTION INTRAMUSCULAR at 09:35

## 2021-01-08 NOTE — PROGRESS NOTES
Pt. Here for injection. Denies any questions or concerns. Pt. Reported that she had a fall last night in the house while doing laundry. Pt. Was able to get back up and stated she had no injury at the time. Injection given as ordered in right arm, pt. Tolerated well.

## 2021-01-28 ENCOUNTER — TELEPHONE (OUTPATIENT)
Dept: INFUSION THERAPY | Age: 71
End: 2021-01-28

## 2021-01-28 NOTE — TELEPHONE ENCOUNTER
Returned call to pt & moved her B12 injection to 2/5/21 @ 2:15. Pt states family members have not been feeling well & she's hoping she does not catch anything from them. Pt will let us know if she needs to cancel.

## 2021-02-15 NOTE — PROGRESS NOTES
Patient Name: Colt Nogueira  Patient : 1950  Patient MRN: [id number]     Primary Oncologist: Briseyda Resendez MD  Referring Provider: Valerio Daley MD     Date of Service: 3/4/2021      Chief Complaint:   Chief Complaint   Patient presents with   [address]     She came in for follow-up visit. Problem List:      Iron deficiency anemia secondary to blood loss (chronic)     Vitamin B12 deficiency anemia     Personal history of other diseases of the digestive system     HPI:   Rei Smith is a pleasant 72-year-old  female patient with an underlying history of anemia of iron deficiency, who was referred for consideration of iron infusion. She received parenteral iron in  without any problems. She was seen by Dr. Laurel Major previously and had colonoscopy and upper endoscopic study more than five years ago. She denied any melena or hematochezia. She received blood transfusion in 2017. At that time, hemoglobin was 7. Stool occult was several years back. In 2017 CMP was within normal limits. White cell count was 6.2, hemoglobin 7.2, hematocrit 25, MCV 59.2, platelet 173. Ferritin was 6, B12 266, TSH 2.28. She tried iron pills, which caused her to have irritation of the stomach and constipation. Blood test in 2017 revealed iron 21. BMP was within normal limits. White cell count was 5.4, hemoglobin 9.2, hematocrit 29.7, MCV 71.1, platelet 748, TSH 4.469. Ferritin was 9 and B12 was 258. She had left cataract surgery on 2017. She had right eye cataract surgeryIn 2018 and hematoma below the right eye. She received iron infusion in 2018 with mild improvement of hemoglobin and ferritin. Iron saturation remained low. She is on omeprazole for acid reflux. Blood tests in 2018 revealed hemoglobin 9.6 MCV 81.5 ferritin 38. B12 was borderline therefore I started B12 injection SQ monthly. She could not tolerate iron by mouth.    In , 2018 anemia was corrected. She had upper, lower endoscopic study and capsule endoscopic study in first quarter of 2018. She was prescribed Iron pill causing stomach discomfort. I advise to take QOD. She was found to have hiatal hernia and small gastric ulcer. She is on omeprazole. Labs in May 2019 showed ferritin at 11, TSH 0.377, white cell 4.0, hemoglobin 10.7, MCV 71.6, platelet 936. CMP was unremarkable. She received blood transfusion in January 2019. She stopped B12 injection in April or March 2019. She takes iron pill every 2 days due to constipation. Reportedly mammogram in December 2018 was benign. She is on antidepressant. She had Infed in August 2019. Ferritin improved to ~30 and Hg ~13.6. She takes prenatal vitamin with Iron every other day. Labs in December 2019 were reviewed. Hg and Ferritin improved. She started B-12 injection in December 2019. Reports she has resumed B12. She received INFeD in November 2020. She was agreeable to resume the B12 shot again. She has been followed by Dr. Jorge Luis Leija for the chronic knee pain. She received cortical cortisone shot and gel shot. On March 4, 2021 she came in for follow-up visit. I will check labs today including iron study and advise to call for result. She will have B-12 injection today. She has chronic back pain. She can not tolerate oral iron. She has not followed up with GI. She denies any acute pain or depression. No nausea, vomiting or diarrhea. No melena or hematochezia. No dysuria or hematuria. She had COVID vaccine. Past Medical History  Allergic rhinitis, anemia of iron deficiency, diverticulosis of the colon, status post colonoscopy in December 2011, fibromyalgia, GERD, dyslipidemia, hypothyroidism, vitamin D deficiency. Surgical History   Breast biopsy, cholecystectomy in 2006 by Dr. Drew Kat. Current Therapy:  VITAMIN B12: CYANOCOBALAMIN MONTHLY      Interval History:  Reports energy is fair, she can complete ADL. 03/04/2021    DIFFTYPE AUTOMATED DIFFERENTIAL 03/04/2021     Chemistry:  Lab Results   Component Value Date     09/22/2020    K 4.2 09/22/2020     09/22/2020    CO2 27 09/22/2020    BUN 12 09/22/2020    CREATININE 0.9 09/22/2020    GLUCOSE 96 09/22/2020    CALCIUM 9.6 09/22/2020    PROT 6.9 09/22/2020    LABALBU 4.7 09/22/2020    BILITOT 0.3 09/22/2020    ALKPHOS 93 09/22/2020    AST 25 09/22/2020    ALT 13 09/22/2020    LABGLOM >60 09/22/2020    GFRAA >60 09/22/2020     Immunology:  Lab Results   Component Value Date    PROT 6.9 09/22/2020     Anemia Panel:  Lab Results   Component Value Date    MNOJZYOV34 226.7 12/03/2020    FOLATE 5.5 12/03/2020     Imaging:      Pathology:      Observations:  Performance Status: 1  Symptoms, but ambulatory. Restricted in physically strenuous activity, but ambulatory and able to carry out work of a light or sedentary nature   Depression Status:   Cognitive Status: Oriented to person, time, and place     Assessment & Plan:    1. She had upper and lower endoscopic study by Dr. Beatriz Hughes. She received iron infusions more than five years ago. She received INFeD in January 2018 with mild improvement of hemoglobin and ferritin. Upper and lower endoscopic study and capsule endoscopic study in March 2018 were performed by Dr Beatriz Hughes. Blood test in May 2019 was suggestive of low iron. She had Infed in August 2019. Ferritin is borderline low, Hg is improving. She had Infed in November 2019. Labs in December 2019 were reviewed. CBC and Ferritin improved. In June 2020 hemoglobin, B12 and iron drop. She did not come for the B12 injection for a while. She resumed B12 shot December 2020. She received iron infusion in November 2020. She will have B-12 injection in March 2021. I will recheck CBC, B12 and iron studies today and advise to call for result. 2. Due to low B-12 level, she started on monthly SQ B-12 injection. She stopped B-12 injections in March or April 2019.  B-12 in December 2019 was low. She resumed B-12 injection in December 2019. She resumed B12 injection in December 2020. I checked B-12 level today. 3. I advised her to keep her other age-appropriate cancer screening up to date. Mammogram in December 2018 was benign. I remind her about mammogram.    4. I advise to exercise. She could have chronic fatigue. RTC in 8 weeks sooner. I will check labs prior to office visit. All of her questions have been answered for today. Recent imaging and labs were reviewed and discussed with the patient.

## 2021-02-22 ENCOUNTER — TELEPHONE (OUTPATIENT)
Dept: INFUSION THERAPY | Age: 71
End: 2021-02-22

## 2021-02-22 NOTE — TELEPHONE ENCOUNTER
Called to pt to reschedule her missed B12 injection from 2/5; added injection to his ov on 3/4 @ 1:30; pt voiced understanding.

## 2021-03-04 ENCOUNTER — HOSPITAL ENCOUNTER (OUTPATIENT)
Dept: INFUSION THERAPY | Age: 71
Discharge: HOME | End: 2021-03-04
Payer: MEDICARE

## 2021-03-04 ENCOUNTER — OFFICE VISIT (OUTPATIENT)
Dept: ONCOLOGY | Age: 71
End: 2021-03-04
Payer: MEDICARE

## 2021-03-04 VITALS
SYSTOLIC BLOOD PRESSURE: 187 MMHG | RESPIRATION RATE: 16 BRPM | TEMPERATURE: 98.5 F | HEIGHT: 65 IN | OXYGEN SATURATION: 98 % | DIASTOLIC BLOOD PRESSURE: 72 MMHG | WEIGHT: 209.8 LBS | HEART RATE: 85 BPM | BODY MASS INDEX: 34.95 KG/M2

## 2021-03-04 DIAGNOSIS — D64.9 ANEMIA, UNSPECIFIED TYPE: Primary | ICD-10-CM

## 2021-03-04 DIAGNOSIS — D51.0 VITAMIN B12 DEFICIENCY ANEMIA DUE TO INTRINSIC FACTOR DEFICIENCY: ICD-10-CM

## 2021-03-04 LAB
BASOPHILS ABSOLUTE: 0.1 K/CU MM
BASOPHILS RELATIVE PERCENT: 1 % (ref 0–1)
DIFFERENTIAL TYPE: ABNORMAL
EOSINOPHILS ABSOLUTE: 0.1 K/CU MM
EOSINOPHILS RELATIVE PERCENT: 2.2 % (ref 0–3)
FERRITIN: 9 NG/ML (ref 15–150)
FOLATE: 4.5 NG/ML (ref 3.1–17.5)
HCT VFR BLD CALC: 28.8 % (ref 37–47)
HEMOGLOBIN: 8.7 GM/DL (ref 12.5–16)
IRON: 20 UG/DL (ref 37–145)
LYMPHOCYTES ABSOLUTE: 1.1 K/CU MM
LYMPHOCYTES RELATIVE PERCENT: 17.6 % (ref 24–44)
MCH RBC QN AUTO: 20.8 PG (ref 27–31)
MCHC RBC AUTO-ENTMCNC: 30.2 % (ref 32–36)
MCV RBC AUTO: 68.7 FL (ref 78–100)
MONOCYTES ABSOLUTE: 0.6 K/CU MM
MONOCYTES RELATIVE PERCENT: 9.5 % (ref 0–4)
PCT TRANSFERRIN: 6 % (ref 10–44)
PDW BLD-RTO: 23.5 % (ref 11.7–14.9)
PLATELET # BLD: 295 K/CU MM (ref 140–440)
PMV BLD AUTO: 10.4 FL (ref 7.5–11.1)
RBC # BLD: 4.19 M/CU MM (ref 4.2–5.4)
SEGMENTED NEUTROPHILS ABSOLUTE COUNT: 4.2 K/CU MM
SEGMENTED NEUTROPHILS RELATIVE PERCENT: 69.7 % (ref 36–66)
TOTAL IRON BINDING CAPACITY: 357 UG/DL (ref 250–450)
UNSATURATED IRON BINDING CAPACITY: 337 UG/DL (ref 110–370)
VITAMIN B-12: 356.7 PG/ML (ref 211–911)
WBC # BLD: 6 K/CU MM (ref 4–10.5)

## 2021-03-04 PROCEDURE — 6360000002 HC RX W HCPCS: Performed by: INTERNAL MEDICINE

## 2021-03-04 PROCEDURE — 82746 ASSAY OF FOLIC ACID SERUM: CPT

## 2021-03-04 PROCEDURE — G8484 FLU IMMUNIZE NO ADMIN: HCPCS | Performed by: INTERNAL MEDICINE

## 2021-03-04 PROCEDURE — 36415 COLL VENOUS BLD VENIPUNCTURE: CPT

## 2021-03-04 PROCEDURE — 99213 OFFICE O/P EST LOW 20 MIN: CPT | Performed by: INTERNAL MEDICINE

## 2021-03-04 PROCEDURE — G8400 PT W/DXA NO RESULTS DOC: HCPCS | Performed by: INTERNAL MEDICINE

## 2021-03-04 PROCEDURE — 1123F ACP DISCUSS/DSCN MKR DOCD: CPT | Performed by: INTERNAL MEDICINE

## 2021-03-04 PROCEDURE — 82728 ASSAY OF FERRITIN: CPT

## 2021-03-04 PROCEDURE — G8427 DOCREV CUR MEDS BY ELIG CLIN: HCPCS | Performed by: INTERNAL MEDICINE

## 2021-03-04 PROCEDURE — 3017F COLORECTAL CA SCREEN DOC REV: CPT | Performed by: INTERNAL MEDICINE

## 2021-03-04 PROCEDURE — 85025 COMPLETE CBC W/AUTO DIFF WBC: CPT

## 2021-03-04 PROCEDURE — 83540 ASSAY OF IRON: CPT

## 2021-03-04 PROCEDURE — G8417 CALC BMI ABV UP PARAM F/U: HCPCS | Performed by: INTERNAL MEDICINE

## 2021-03-04 PROCEDURE — 1090F PRES/ABSN URINE INCON ASSESS: CPT | Performed by: INTERNAL MEDICINE

## 2021-03-04 PROCEDURE — 96372 THER/PROPH/DIAG INJ SC/IM: CPT

## 2021-03-04 PROCEDURE — 83550 IRON BINDING TEST: CPT

## 2021-03-04 PROCEDURE — 82607 VITAMIN B-12: CPT

## 2021-03-04 PROCEDURE — 1036F TOBACCO NON-USER: CPT | Performed by: INTERNAL MEDICINE

## 2021-03-04 PROCEDURE — 4040F PNEUMOC VAC/ADMIN/RCVD: CPT | Performed by: INTERNAL MEDICINE

## 2021-03-04 RX ORDER — CYANOCOBALAMIN 1000 UG/ML
1000 INJECTION INTRAMUSCULAR; SUBCUTANEOUS ONCE
Status: CANCELLED
Start: 2021-03-05

## 2021-03-04 RX ORDER — CYANOCOBALAMIN 1000 UG/ML
1000 INJECTION INTRAMUSCULAR; SUBCUTANEOUS ONCE
Status: COMPLETED
Start: 2021-03-04 | End: 2021-03-04

## 2021-03-04 RX ADMIN — CYANOCOBALAMIN 1000 MCG: 1000 INJECTION, SOLUTION INTRAMUSCULAR at 14:07

## 2021-03-04 ASSESSMENT — PATIENT HEALTH QUESTIONNAIRE - PHQ9
SUM OF ALL RESPONSES TO PHQ QUESTIONS 1-9: 0
SUM OF ALL RESPONSES TO PHQ QUESTIONS 1-9: 0
1. LITTLE INTEREST OR PLEASURE IN DOING THINGS: 0

## 2021-03-04 NOTE — PROGRESS NOTES
1407: Pt here for B12 injection, A&OX3. She came to the tx room after seeing Dr. Medhat Esparza today    Pt reports that she received her 1st COVID injection and gets her 2nd one soon. B12 injection given in left upper arm today, IM injection, bandaid to site. AVS report printed and given to the patient.

## 2021-03-04 NOTE — PROGRESS NOTES
[address] [facility] Rooming Questions  Patient: Srinath Akers  MRN: [id number]    Date: 3/4/2021        1. Do you have any new issues?   no         2. Do you need any refills on medications?    no    3. Have you had any imaging done since your last visit?   no    4. Have you been hospitalized or seen in the emergency room since your last visit here?   no    5. Did the patient have a depression screening completed today?  Yes    PHQ-9 Total Score: 0 (3/4/2021  1:35 PM)       PHQ-9 Given to (if applicable):               PHQ-9 Score (if applicable):                     [] Positive     []  Negative              Does question #9 need addressed (if applicable)                     [] Yes    []  No               Kirill Calix MA

## 2021-03-12 ENCOUNTER — TELEPHONE (OUTPATIENT)
Dept: ONCOLOGY | Age: 71
End: 2021-03-12

## 2021-03-12 RX ORDER — DIPHENHYDRAMINE HYDROCHLORIDE 50 MG/ML
50 INJECTION INTRAMUSCULAR; INTRAVENOUS ONCE
Status: CANCELLED | OUTPATIENT
Start: 2021-03-24 | End: 2021-03-23

## 2021-03-12 RX ORDER — SODIUM CHLORIDE 0.9 % (FLUSH) 0.9 %
5 SYRINGE (ML) INJECTION PRN
Status: CANCELLED | OUTPATIENT
Start: 2021-03-24

## 2021-03-12 RX ORDER — METHYLPREDNISOLONE SODIUM SUCCINATE 125 MG/2ML
125 INJECTION, POWDER, LYOPHILIZED, FOR SOLUTION INTRAMUSCULAR; INTRAVENOUS ONCE
Status: CANCELLED | OUTPATIENT
Start: 2021-03-24 | End: 2021-03-23

## 2021-03-12 RX ORDER — SODIUM CHLORIDE 9 MG/ML
INJECTION, SOLUTION INTRAVENOUS CONTINUOUS
Status: CANCELLED | OUTPATIENT
Start: 2021-03-24

## 2021-03-12 RX ORDER — HEPARIN SODIUM (PORCINE) LOCK FLUSH IV SOLN 100 UNIT/ML 100 UNIT/ML
500 SOLUTION INTRAVENOUS PRN
Status: CANCELLED | OUTPATIENT
Start: 2021-03-24

## 2021-03-12 RX ORDER — ACETAMINOPHEN 160 MG/5ML
650 SOLUTION ORAL PRN
Status: CANCELLED
Start: 2021-03-24

## 2021-03-12 RX ORDER — SODIUM CHLORIDE 0.9 % (FLUSH) 0.9 %
10 SYRINGE (ML) INJECTION PRN
Status: CANCELLED | OUTPATIENT
Start: 2021-03-24

## 2021-03-12 RX ORDER — EPINEPHRINE 1 MG/ML
0.3 INJECTION, SOLUTION, CONCENTRATE INTRAVENOUS PRN
Status: CANCELLED | OUTPATIENT
Start: 2021-03-24

## 2021-03-12 RX ORDER — DEXAMETHASONE SODIUM PHOSPHATE 10 MG/ML
10 INJECTION INTRAMUSCULAR; INTRAVENOUS PRN
Status: CANCELLED
Start: 2021-03-24

## 2021-03-12 RX ORDER — MEPERIDINE HYDROCHLORIDE 50 MG/ML
12.5 INJECTION INTRAMUSCULAR; INTRAVENOUS; SUBCUTANEOUS ONCE
Status: CANCELLED | OUTPATIENT
Start: 2021-03-24 | End: 2021-03-23

## 2021-03-12 NOTE — TELEPHONE ENCOUNTER
Returned call to patient. Reviewed labs with patient and with Dr Calvin Postal. Dr Calvin Postal ordered an Infed infusion. Patient states that she is very tired and fatigued and states that she does feel that she needs this infusion. Order placed and informed .

## 2021-03-17 ENCOUNTER — TELEPHONE (OUTPATIENT)
Dept: INFUSION THERAPY | Age: 71
End: 2021-03-17

## 2021-03-24 ENCOUNTER — HOSPITAL ENCOUNTER (OUTPATIENT)
Dept: INFUSION THERAPY | Age: 71
Discharge: HOME | End: 2021-03-24
Payer: MEDICARE

## 2021-03-24 VITALS
BODY MASS INDEX: 34.75 KG/M2 | HEIGHT: 65 IN | SYSTOLIC BLOOD PRESSURE: 152 MMHG | TEMPERATURE: 97.7 F | HEART RATE: 94 BPM | RESPIRATION RATE: 16 BRPM | OXYGEN SATURATION: 96 % | DIASTOLIC BLOOD PRESSURE: 67 MMHG | WEIGHT: 208.6 LBS

## 2021-03-24 DIAGNOSIS — D50.0 IRON DEFICIENCY ANEMIA SECONDARY TO BLOOD LOSS (CHRONIC): ICD-10-CM

## 2021-03-24 DIAGNOSIS — K90.9 INTESTINAL MALABSORPTION, UNSPECIFIED TYPE: Primary | ICD-10-CM

## 2021-03-24 PROCEDURE — 96365 THER/PROPH/DIAG IV INF INIT: CPT

## 2021-03-24 PROCEDURE — 96366 THER/PROPH/DIAG IV INF ADDON: CPT

## 2021-03-24 PROCEDURE — 2580000003 HC RX 258: Performed by: INTERNAL MEDICINE

## 2021-03-24 PROCEDURE — 6370000000 HC RX 637 (ALT 250 FOR IP): Performed by: INTERNAL MEDICINE

## 2021-03-24 PROCEDURE — 6360000002 HC RX W HCPCS: Performed by: INTERNAL MEDICINE

## 2021-03-24 RX ORDER — SODIUM CHLORIDE 0.9 % (FLUSH) 0.9 %
10 SYRINGE (ML) INJECTION PRN
Status: CANCELLED | OUTPATIENT
Start: 2021-03-31

## 2021-03-24 RX ORDER — SODIUM CHLORIDE 9 MG/ML
INJECTION, SOLUTION INTRAVENOUS CONTINUOUS
Status: CANCELLED | OUTPATIENT
Start: 2021-03-31

## 2021-03-24 RX ORDER — MEPERIDINE HYDROCHLORIDE 25 MG/ML
12.5 INJECTION INTRAMUSCULAR; INTRAVENOUS; SUBCUTANEOUS ONCE
Status: CANCELLED | OUTPATIENT
Start: 2021-03-31 | End: 2021-03-31

## 2021-03-24 RX ORDER — ACETAMINOPHEN 325 MG/1
650 TABLET ORAL ONCE
Status: COMPLETED | OUTPATIENT
Start: 2021-03-24 | End: 2021-03-24

## 2021-03-24 RX ORDER — ACETAMINOPHEN 160 MG/5ML
650 SOLUTION ORAL PRN
Status: DISCONTINUED | OUTPATIENT
Start: 2021-03-24 | End: 2021-03-24

## 2021-03-24 RX ORDER — DEXAMETHASONE SODIUM PHOSPHATE 10 MG/ML
10 INJECTION INTRAMUSCULAR; INTRAVENOUS PRN
Status: CANCELLED
Start: 2021-03-31

## 2021-03-24 RX ORDER — EPINEPHRINE 1 MG/ML
0.3 INJECTION, SOLUTION, CONCENTRATE INTRAVENOUS PRN
Status: CANCELLED | OUTPATIENT
Start: 2021-03-31

## 2021-03-24 RX ORDER — DEXAMETHASONE SODIUM PHOSPHATE 10 MG/ML
10 INJECTION INTRAMUSCULAR; INTRAVENOUS PRN
Status: DISCONTINUED | OUTPATIENT
Start: 2021-03-24 | End: 2021-03-24 | Stop reason: SDUPTHER

## 2021-03-24 RX ORDER — METHYLPREDNISOLONE SODIUM SUCCINATE 125 MG/2ML
125 INJECTION, POWDER, LYOPHILIZED, FOR SOLUTION INTRAMUSCULAR; INTRAVENOUS ONCE
Status: CANCELLED | OUTPATIENT
Start: 2021-03-31 | End: 2021-03-31

## 2021-03-24 RX ORDER — SODIUM CHLORIDE 0.9 % (FLUSH) 0.9 %
5 SYRINGE (ML) INJECTION PRN
Status: CANCELLED | OUTPATIENT
Start: 2021-03-31

## 2021-03-24 RX ORDER — HEPARIN SODIUM (PORCINE) LOCK FLUSH IV SOLN 100 UNIT/ML 100 UNIT/ML
500 SOLUTION INTRAVENOUS PRN
Status: CANCELLED | OUTPATIENT
Start: 2021-03-31

## 2021-03-24 RX ORDER — DIPHENHYDRAMINE HYDROCHLORIDE 50 MG/ML
50 INJECTION INTRAMUSCULAR; INTRAVENOUS ONCE
Status: CANCELLED | OUTPATIENT
Start: 2021-03-31 | End: 2021-03-31

## 2021-03-24 RX ORDER — ACETAMINOPHEN 160 MG/5ML
650 SOLUTION ORAL PRN
Status: CANCELLED
Start: 2021-03-31

## 2021-03-24 RX ORDER — SODIUM CHLORIDE 9 MG/ML
INJECTION, SOLUTION INTRAVENOUS CONTINUOUS
Status: ACTIVE | OUTPATIENT
Start: 2021-03-24 | End: 2021-03-24

## 2021-03-24 RX ADMIN — SODIUM CHLORIDE 1000 MG: 9 INJECTION, SOLUTION INTRAVENOUS at 09:32

## 2021-03-24 RX ADMIN — ACETAMINOPHEN 650 MG: 325 TABLET ORAL at 09:12

## 2021-03-24 RX ADMIN — SODIUM CHLORIDE: 900 INJECTION, SOLUTION INTRAVENOUS at 09:11

## 2021-03-24 RX ADMIN — DEXAMETHASONE SODIUM PHOSPHATE 10 MG: 10 INJECTION, SOLUTION INTRAMUSCULAR; INTRAVENOUS at 09:15

## 2021-03-24 ASSESSMENT — PAIN SCALES - GENERAL: PAINLEVEL_OUTOF10: 0

## 2021-03-24 NOTE — PROGRESS NOTES
Patient here for iron infusion. Feeling okay. States she has had Infed previously and tolerated without any problems. Infed infusion given as ordered. Patient slept through part of infusion. Denies any problems. RTC 4/1 for B12 injection. AVS with future appointments given  to patient.

## 2021-04-12 NOTE — PROGRESS NOTES
Patient Name: Bobby Gonzales  Patient : 1950  Patient MRN: [id number]     Primary Oncologist: Jacki Tucker MD  Referring Provider: Keyla Garcia MD     Date of Service: 5/10/2021      Chief Complaint:   Chief Complaint   Patient presents with    Follow-up     She came in for follow-up visit. Problem List:      Iron deficiency anemia secondary to blood loss (chronic)     Vitamin B12 deficiency anemia     Personal history of other diseases of the digestive system     HPI:   Juan Mayo is a pleasant 68-year-old  female patient with an underlying history of anemia of iron deficiency, who was referred for consideration of iron infusion. She received parenteral iron in  without any problems. She was seen by Dr. Jayne Guo previously and had colonoscopy and upper endoscopic study more than five years ago. She denied any melena or hematochezia. She received blood transfusion in 2017. At that time, hemoglobin was 7. Stool occult was several years back. In 2017 CMP was within normal limits. White cell count was 6.2, hemoglobin 7.2, hematocrit 25, MCV 59.2, platelet 524. Ferritin was 6, B12 266, TSH 2.28. She tried iron pills, which caused her to have irritation of the stomach and constipation. Blood test in 2017 revealed iron 21. BMP was within normal limits. White cell count was 5.4, hemoglobin 9.2, hematocrit 29.7, MCV 71.1, platelet 160, TSH 8.261. Ferritin was 9 and B12 was 258. She had left cataract surgery on 2017. She had right eye cataract surgeryIn 2018 and hematoma below the right eye. She received iron infusion in 2018 with mild improvement of hemoglobin and ferritin. Iron saturation remained low. She is on omeprazole for acid reflux. Blood tests in 2018 revealed hemoglobin 9.6 MCV 81.5 ferritin 38. B12 was borderline therefore I started B12 injection SQ monthly. She could not tolerate iron by mouth.    In 2018 anemia was corrected. She had upper, lower endoscopic study and capsule endoscopic study in first quarter of 2018. She was prescribed Iron pill causing stomach discomfort. I advise to take QOD. She was found to have hiatal hernia and small gastric ulcer. She is on omeprazole. Labs in May 2019 showed ferritin at 11, TSH 0.377, white cell 4.0, hemoglobin 10.7, MCV 71.6, platelet 573. CMP was unremarkable. She received blood transfusion in January 2019. She stopped B12 injection in April or March 2019. She takes iron pill every 2 days due to constipation. Reportedly mammogram in December 2018 was benign. She is on antidepressant. She had Infed in August 2019. Ferritin improved to ~30 and Hg ~13.6. She takes prenatal vitamin with Iron every other day. Labs in December 2019 were reviewed. Hg and Ferritin improved. She started B-12 injection in December 2019. Reports she has resumed B12. She received INFeD in November 2020. She was agreeable to resume the B12 shot. She has been followed by Dr. Kenroy Hugo for the chronic knee pain. She received cortical cortisone shot and gel shot. On March 4, 2021 she had B-12 injection. On March 10, 2021 she came in for follow-up visit. Reportedly colonoscopy is not due until 2025. She is agreeable to try B12 sublingual.  Labs in May 2021 showed improvement of the hemoglobin at 11.9, ferritin at 54, B12 395.9. She still feels weak. I recommend to exercise. She has generalized body ache. She can not tolerate oral iron. She has not followed up with GI. She denies any acute pain or depression. No nausea, vomiting or diarrhea. No melena or hematochezia. No dysuria or hematuria. She had COVID vaccine. Past Medical History  Allergic rhinitis, anemia of iron deficiency, diverticulosis of the colon, status post colonoscopy in December 2011, fibromyalgia, GERD, dyslipidemia, hypothyroidism, vitamin D deficiency.      Surgical History   Breast biopsy, cholecystectomy in 2006 by Dr. Romeo Brooks. Current Therapy:  VITAMIN B12: CYANOCOBALAMIN MONTHLY      Interval History:  Reports energy is fair, she can complete ADL. She denies easy bleeding or bruising, no melena or hematochezia. She denies pain. Review of Systems: \"Per interval history; otherwise 10 point ROS is negative. \"     Vital Signs:  BP (!) 143/67 (Site: Right Upper Arm, Position: Sitting, Cuff Size: Large Adult)   Pulse 86   Temp 96.8 °F (36 °C) (Temporal)   Ht 5' 4.5\" (1.638 m)   Wt 201 lb 6.4 oz (91.4 kg)   SpO2 95%   BMI 34.04 kg/m²     Physical Exam:    CONSTITUTIONAL: awake, alert, cooperative, no apparent distress   EYES: pupils equal, round and reactive to light, sclera clear and conjunctiva normal  ENT: Normocephalic, without obvious abnormality, atraumatic  NECK: supple, symmetrical, no jugular venous distension and no carotid bruits   HEMATOLOGIC/LYMPHATIC: no cervical, supraclavicular or axillary lymphadenopathy   LUNGS: no increased work of breathing and clear to auscultation   CARDIOVASCULAR: regular rate and rhythm, normal S1 and S2, no murmur noted  ABDOMEN: normal bowel sounds x 4, soft, non-distended, non-tender, no masses palpated, no hepatosplenomegaly   MUSCULOSKELETAL: full range of motion noted, tone is normal  NEUROLOGIC: awake, alert, oriented to name, place and time. No focal neuro deficit. SKIN: Normal skin color, texture, turgor and no jaundice. appears intact   EXTREMITIES: no LE edema. No cyanosis.      Labs:    Hematology:  Lab Results   Component Value Date    WBC 4.4 05/03/2021    RBC 4.72 05/03/2021    HGB 11.9 (L) 05/03/2021    HCT 37.0 05/03/2021    MCV 78.4 05/03/2021    MCH 25.2 (L) 05/03/2021    MCHC 32.2 05/03/2021    RDW INSTRUMENT UNABLE TO MEASURE OR CALCULATE. 05/03/2021     05/03/2021    MPV INSTRUMENT UNABLE TO MEASURE OR CALCULATE. 05/03/2021    SEGSPCT 64.7 05/03/2021    EOSRELPCT 3.4 (H) 05/03/2021    BASOPCT 0.5 05/03/2021

## 2021-05-03 ENCOUNTER — HOSPITAL ENCOUNTER (OUTPATIENT)
Dept: INFUSION THERAPY | Age: 71
Discharge: HOME | End: 2021-05-03
Payer: MEDICARE

## 2021-05-03 DIAGNOSIS — D64.9 ANEMIA, UNSPECIFIED TYPE: ICD-10-CM

## 2021-05-03 LAB
BASOPHILS ABSOLUTE: 0 K/CU MM
BASOPHILS RELATIVE PERCENT: 0.5 % (ref 0–1)
DIFFERENTIAL TYPE: ABNORMAL
EOSINOPHILS ABSOLUTE: 0.2 K/CU MM
EOSINOPHILS RELATIVE PERCENT: 3.4 % (ref 0–3)
FERRITIN: 54 NG/ML (ref 15–150)
FOLATE: 7.8 NG/ML (ref 3.1–17.5)
HCT VFR BLD CALC: 37 % (ref 37–47)
HEMOGLOBIN: 11.9 GM/DL (ref 12.5–16)
IRON: 44 UG/DL (ref 37–145)
LYMPHOCYTES ABSOLUTE: 1 K/CU MM
LYMPHOCYTES RELATIVE PERCENT: 23 % (ref 24–44)
MCH RBC QN AUTO: 25.2 PG (ref 27–31)
MCHC RBC AUTO-ENTMCNC: 32.2 % (ref 32–36)
MCV RBC AUTO: 78.4 FL (ref 78–100)
MONOCYTES ABSOLUTE: 0.4 K/CU MM
MONOCYTES RELATIVE PERCENT: 8.4 % (ref 0–4)
PCT TRANSFERRIN: 17 % (ref 10–44)
PDW BLD-RTO: ABNORMAL % (ref 11.7–14.9)
PLATELET # BLD: 161 K/CU MM (ref 140–440)
PMV BLD AUTO: ABNORMAL FL (ref 7.5–11.1)
RBC # BLD: 4.72 M/CU MM (ref 4.2–5.4)
SEGMENTED NEUTROPHILS ABSOLUTE COUNT: 2.9 K/CU MM
SEGMENTED NEUTROPHILS RELATIVE PERCENT: 64.7 % (ref 36–66)
TOTAL IRON BINDING CAPACITY: 260 UG/DL (ref 250–450)
UNSATURATED IRON BINDING CAPACITY: 216 UG/DL (ref 110–370)
VITAMIN B-12: 395.9 PG/ML (ref 211–911)
WBC # BLD: 4.4 K/CU MM (ref 4–10.5)

## 2021-05-03 PROCEDURE — 82607 VITAMIN B-12: CPT

## 2021-05-03 PROCEDURE — 85025 COMPLETE CBC W/AUTO DIFF WBC: CPT

## 2021-05-03 PROCEDURE — 82746 ASSAY OF FOLIC ACID SERUM: CPT

## 2021-05-03 PROCEDURE — 83540 ASSAY OF IRON: CPT

## 2021-05-03 PROCEDURE — 36415 COLL VENOUS BLD VENIPUNCTURE: CPT

## 2021-05-03 PROCEDURE — 82728 ASSAY OF FERRITIN: CPT

## 2021-05-03 PROCEDURE — 83550 IRON BINDING TEST: CPT

## 2021-05-10 ENCOUNTER — HOSPITAL ENCOUNTER (OUTPATIENT)
Dept: INFUSION THERAPY | Age: 71
Discharge: HOME | End: 2021-05-10
Payer: MEDICARE

## 2021-05-10 ENCOUNTER — OFFICE VISIT (OUTPATIENT)
Dept: ONCOLOGY | Age: 71
End: 2021-05-10
Payer: MEDICARE

## 2021-05-10 VITALS
HEART RATE: 86 BPM | HEIGHT: 65 IN | OXYGEN SATURATION: 95 % | WEIGHT: 201.4 LBS | DIASTOLIC BLOOD PRESSURE: 67 MMHG | TEMPERATURE: 96.8 F | SYSTOLIC BLOOD PRESSURE: 143 MMHG | BODY MASS INDEX: 33.55 KG/M2

## 2021-05-10 DIAGNOSIS — D64.9 ANEMIA, UNSPECIFIED TYPE: Primary | ICD-10-CM

## 2021-05-10 DIAGNOSIS — D51.0 VITAMIN B12 DEFICIENCY ANEMIA DUE TO INTRINSIC FACTOR DEFICIENCY: Primary | ICD-10-CM

## 2021-05-10 DIAGNOSIS — Z12.31 SCREENING MAMMOGRAM FOR HIGH-RISK PATIENT: ICD-10-CM

## 2021-05-10 PROCEDURE — G8427 DOCREV CUR MEDS BY ELIG CLIN: HCPCS | Performed by: INTERNAL MEDICINE

## 2021-05-10 PROCEDURE — 4040F PNEUMOC VAC/ADMIN/RCVD: CPT | Performed by: INTERNAL MEDICINE

## 2021-05-10 PROCEDURE — 99213 OFFICE O/P EST LOW 20 MIN: CPT | Performed by: INTERNAL MEDICINE

## 2021-05-10 PROCEDURE — G8417 CALC BMI ABV UP PARAM F/U: HCPCS | Performed by: INTERNAL MEDICINE

## 2021-05-10 PROCEDURE — 1036F TOBACCO NON-USER: CPT | Performed by: INTERNAL MEDICINE

## 2021-05-10 PROCEDURE — 6360000002 HC RX W HCPCS: Performed by: INTERNAL MEDICINE

## 2021-05-10 PROCEDURE — 1123F ACP DISCUSS/DSCN MKR DOCD: CPT | Performed by: INTERNAL MEDICINE

## 2021-05-10 PROCEDURE — 96372 THER/PROPH/DIAG INJ SC/IM: CPT

## 2021-05-10 PROCEDURE — 3017F COLORECTAL CA SCREEN DOC REV: CPT | Performed by: INTERNAL MEDICINE

## 2021-05-10 PROCEDURE — 1090F PRES/ABSN URINE INCON ASSESS: CPT | Performed by: INTERNAL MEDICINE

## 2021-05-10 PROCEDURE — G8400 PT W/DXA NO RESULTS DOC: HCPCS | Performed by: INTERNAL MEDICINE

## 2021-05-10 RX ORDER — CYANOCOBALAMIN (VITAMIN B-12) 100 MCG
1000 TABLET ORAL DAILY
Qty: 30 TABLET | Refills: 3 | Status: SHIPPED | OUTPATIENT
Start: 2021-05-10 | End: 2022-05-20

## 2021-05-10 RX ORDER — CYANOCOBALAMIN 1000 UG/ML
1000 INJECTION INTRAMUSCULAR; SUBCUTANEOUS ONCE
Status: COMPLETED
Start: 2021-05-10 | End: 2021-05-10

## 2021-05-10 RX ORDER — OMEPRAZOLE 40 MG/1
CAPSULE, DELAYED RELEASE ORAL
COMMUNITY
Start: 2021-04-19

## 2021-05-10 RX ORDER — CYANOCOBALAMIN 1000 UG/ML
1000 INJECTION INTRAMUSCULAR; SUBCUTANEOUS ONCE
Status: CANCELLED
Start: 2021-05-27

## 2021-05-10 RX ADMIN — CYANOCOBALAMIN 1000 MCG: 1000 INJECTION, SOLUTION INTRAMUSCULAR at 14:36

## 2021-05-10 ASSESSMENT — PATIENT HEALTH QUESTIONNAIRE - PHQ9
SUM OF ALL RESPONSES TO PHQ QUESTIONS 1-9: 2
SUM OF ALL RESPONSES TO PHQ QUESTIONS 1-9: 2
2. FEELING DOWN, DEPRESSED OR HOPELESS: 2
SUM OF ALL RESPONSES TO PHQ QUESTIONS 1-9: 2
SUM OF ALL RESPONSES TO PHQ9 QUESTIONS 1 & 2: 2

## 2021-05-10 NOTE — PROGRESS NOTES
Patient arrived to treatment suite for vitamin B-12 injection. No questions or concerns for the doctor at this time. Treatment approved and given. Patient tolerated well. Left treatment suite ambulatory. Discharge instructions and lab results provided.

## 2021-05-10 NOTE — PROGRESS NOTES
[facility] Rooming Questions  Patient: Calixto Lo  MRN: [id number]    Date: 5/10/2021        1. Do you have any new issues?   no         2. Do you need any refills on medications?    no    3. Have you had any imaging done since your last visit? yes - here     4. Have you been hospitalized or seen in the emergency room since your last visit here?   no    5. Did the patient have a depression screening completed today?  Yes    PHQ-9 Total Score: 2 (5/10/2021  2:25 PM)       PHQ-9 Given to (if applicable):               PHQ-9 Score (if applicable):                     [] Positive     []  Negative              Does question #9 need addressed (if applicable)                     [] Yes    []  No               Edward Mtz CMA

## 2021-05-21 ENCOUNTER — TELEPHONE (OUTPATIENT)
Dept: ONCOLOGY | Age: 71
End: 2021-05-21

## 2021-05-26 ENCOUNTER — TELEPHONE (OUTPATIENT)
Dept: ONCOLOGY | Age: 71
End: 2021-05-26

## 2021-06-03 ENCOUNTER — HOSPITAL ENCOUNTER (OUTPATIENT)
Dept: INFUSION THERAPY | Age: 71
Discharge: HOME | End: 2021-06-03
Payer: MEDICARE

## 2021-06-03 DIAGNOSIS — D51.0 VITAMIN B12 DEFICIENCY ANEMIA DUE TO INTRINSIC FACTOR DEFICIENCY: Primary | ICD-10-CM

## 2021-06-03 PROCEDURE — 96372 THER/PROPH/DIAG INJ SC/IM: CPT

## 2021-06-03 PROCEDURE — 6360000002 HC RX W HCPCS: Performed by: INTERNAL MEDICINE

## 2021-06-03 RX ORDER — CYANOCOBALAMIN 1000 UG/ML
1000 INJECTION INTRAMUSCULAR; SUBCUTANEOUS ONCE
Status: COMPLETED
Start: 2021-06-03 | End: 2021-06-03

## 2021-06-03 RX ORDER — CYANOCOBALAMIN 1000 UG/ML
1000 INJECTION INTRAMUSCULAR; SUBCUTANEOUS ONCE
Status: CANCELLED
Start: 2021-06-07

## 2021-06-03 RX ADMIN — CYANOCOBALAMIN 1000 MCG: 1000 INJECTION, SOLUTION INTRAMUSCULAR at 13:47

## 2021-06-03 NOTE — PROGRESS NOTES
Pt reports for B-12 monthly injection. Reviewed B-12 levels with Dr. Scotty Hernandez who agreed with administration of immunization. Administered 1,000 mcg of cyanocobalamin in Rt deltoid. Pt tolerated procedure well.

## 2021-06-08 NOTE — PROGRESS NOTES
Patient Name: Kathya Jones  Patient : 1950  Patient MRN: [id number]     Primary Oncologist: Raffaele Hernandez MD  Referring Provider: Sarahy Gomez MD     Date of Service: 2021      Chief Complaint:   Chief Complaint   Patient presents with    Follow-up     She came in for follow-up visit. Problem List:      Iron deficiency anemia secondary to blood loss (chronic)     Vitamin B12 deficiency anemia     Personal history of other diseases of the digestive system     HPI:   Sorin Gannon is a pleasant 60-year-old  female patient with an underlying history of anemia of iron deficiency, who was referred for consideration of iron infusion. She received parenteral iron in  without any problems. She was seen by Dr. Emelia Tubbs previously and had colonoscopy and upper endoscopic study more than five years ago. She denied any melena or hematochezia. She received blood transfusion in 2017. At that time, hemoglobin was 7. Stool occult was several years back. In 2017 CMP was within normal limits. White cell count was 6.2, hemoglobin 7.2, hematocrit 25, MCV 59.2, platelet 620. Ferritin was 6, B12 266, TSH 2.28. She tried iron pills, which caused her to have irritation of the stomach and constipation. Blood test in 2017 revealed iron 21. BMP was within normal limits. White cell count was 5.4, hemoglobin 9.2, hematocrit 29.7, MCV 71.1, platelet 781, TSH 8.304. Ferritin was 9 and B12 was 258. She had left cataract surgery on 2017. She had right eye cataract surgeryIn 2018 and hematoma below the right eye. She received iron infusion in 2018 with mild improvement of hemoglobin and ferritin. Iron saturation remained low. She is on omeprazole for acid reflux. Blood tests in 2018 revealed hemoglobin 9.6 MCV 81.5 ferritin 38. B12 was borderline therefore I started B12 injection SQ monthly. She could not tolerate iron by mouth.    In 2018 anemia was corrected. She had upper, lower endoscopic study and capsule endoscopic study in first quarter of 2018. She was prescribed Iron pill causing stomach discomfort. I advise to take QOD. She was found to have hiatal hernia and small gastric ulcer. She is on omeprazole. Labs in May 2019 showed ferritin at 11, TSH 0.377, white cell 4.0, hemoglobin 10.7, MCV 71.6, platelet 501. CMP was unremarkable. She received blood transfusion in January 2019. She stopped B12 injection in April or March 2019. She takes iron pill every 2 days due to constipation. Reportedly mammogram in December 2018 was benign. She is on antidepressant. She had Infed in August 2019. Ferritin improved to ~30 and Hg ~13.6. She takes prenatal vitamin with Iron every other day. Labs in December 2019 were reviewed. Hg and Ferritin improved. She started B-12 injection in December 2019. Reports she has resumed B12. She received INFeD in November 2020. She was agreeable to resume the B12 shot. She has been followed by Dr. Marcy West for the chronic knee pain. She received cortical cortisone shot and gel shot. On March 4, 2021 she had B-12 injection. Reportedly colonoscopy is not due until 2025. She is agreeable to try B12 sublingual.  Labs in May 2021 showed improvement of the hemoglobin at 11.9, ferritin at 54, B12 395.9. On July 8, 2021 she came in for follow up visit. B-12 in July 2021 was 485.2. Ferritin was 16. Hg was 11.4. She feels more tired. She is agreeable to have iron infusions within 1 week. I will repeat lab with iron study prior to next office visit. She can not tolerate oral iron. She has not followed up with GI. She denies any acute pain or depression. No nausea, vomiting or diarrhea. No melena or hematochezia. No dysuria or hematuria. She had COVID vaccine.      Past Medical History  Allergic rhinitis, anemia of iron deficiency, diverticulosis of the colon, status post colonoscopy in December 2011, fibromyalgia, GERD, dyslipidemia, hypothyroidism, vitamin D deficiency. Surgical History   Breast biopsy, cholecystectomy in 2006 by Dr. Angela Lyn. Review of Systems: \"Per interval history; otherwise 10 point ROS is negative. \"     Vital Signs:  BP (!) 149/74 (Site: Right Upper Arm, Position: Sitting, Cuff Size: Large Adult)   Pulse 68   Temp 96.5 °F (35.8 °C) (Temporal)   Resp 18   Ht 5' 4.5\" (1.638 m)   Wt 209 lb (94.8 kg)   SpO2 96%   BMI 35.32 kg/m²     Physical Exam:    CONSTITUTIONAL: awake, alert, cooperative, no apparent distress   EYES: pupils equal, round and reactive to light, sclera clear and conjunctiva pallor. ENT: Normocephalic, without obvious abnormality, atraumatic  NECK: supple, symmetrical, no jugular venous distension and no carotid bruits   HEMATOLOGIC/LYMPHATIC: no cervical, supraclavicular or axillary lymphadenopathy   LUNGS: no increased work of breathing and clear to auscultation   CARDIOVASCULAR: regular rate and rhythm, normal S1 and S2, no murmur  ABDOMEN: normal bowel sound, soft, non-distended, non-tender, no masses palpated, no hepatosplenomegaly   MUSCULOSKELETAL: full range of motion noted, tone is normal  NEUROLOGIC: awake, alert, oriented to name, place and time. CN II -XII grossly intact. SKIN: Normal skin color, texture, turgor and no jaundice. appears intact   EXTREMITIES: no LE edema. No cyanosis.      Labs:    Hematology:  Lab Results   Component Value Date    WBC 5.4 07/01/2021    RBC 4.46 07/01/2021    HGB 11.4 (L) 07/01/2021    HCT 35.7 (L) 07/01/2021    MCV 80.0 07/01/2021    MCH 25.6 (L) 07/01/2021    MCHC 31.9 (L) 07/01/2021    RDW 19.6 (H) 07/01/2021     07/01/2021    MPV INSTRUMENT UNABLE TO MEASURE OR CALCULATE. 07/01/2021    SEGSPCT 62.8 07/01/2021    EOSRELPCT 3.1 (H) 07/01/2021    BASOPCT 0.9 07/01/2021    LYMPHOPCT 24.0 07/01/2021    MONOPCT 9.2 (H) 07/01/2021    SEGSABS 3.4 07/01/2021    EOSABS 0.2 07/01/2021    BASOSABS 0.1 07/01/2021 LYMPHSABS 1.3 07/01/2021    MONOSABS 0.5 07/01/2021    DIFFTYPE AUTOMATED DIFFERENTIAL 07/01/2021     Chemistry:  Lab Results   Component Value Date     09/22/2020    K 4.2 09/22/2020     09/22/2020    CO2 27 09/22/2020    BUN 12 09/22/2020    CREATININE 0.9 09/22/2020    GLUCOSE 96 09/22/2020    CALCIUM 9.6 09/22/2020    PROT 6.9 09/22/2020    LABALBU 4.7 09/22/2020    BILITOT 0.3 09/22/2020    ALKPHOS 93 09/22/2020    AST 25 09/22/2020    ALT 13 09/22/2020    LABGLOM >60 09/22/2020    GFRAA >60 09/22/2020     Immunology:  Lab Results   Component Value Date    PROT 6.9 09/22/2020     Anemia Panel:  Lab Results   Component Value Date    WUREXCJH22 485.2 07/01/2021    FOLATE 5.6 07/01/2021        Observations:  Performance Status: 1  Symptoms, but ambulatory. Restricted in physically strenuous activity, but ambulatory and able to carry out work of a light or sedentary nature   Depression Status:   Cognitive Status: Oriented to person, time, and place     Assessment & Plan:    1. She had upper and lower endoscopic study by Dr. Shannon Quinn. She received iron infusions more than five years ago. She received INFeD in January 2018 with mild improvement of hemoglobin and ferritin. Upper and lower endoscopic study and capsule endoscopic study in March 2018 were performed by Dr Shannon Quinn. Blood test in May 2019 was suggestive of low iron. She had Infed in August 2019. Ferritin is borderline low, Hg is improving. She had Infed in November 2019. Labs in December 2019 were reviewed. CBC and Ferritin improved. In June 2020 hemoglobin, B12 and iron dropped. She did not come for the B12 injection for a while. She resumed B12 shot December 2020. She received iron infusion in November 2020. She had B-12 injection in March 2021. She received INFeD in March 2021. Labs in May 2021 showed improvement of hemoglobin, iron and B12. Labs in July 2021 showed recurrent PRANAV. She is agreeable to have Iron infusion.   I recommend

## 2021-07-01 ENCOUNTER — HOSPITAL ENCOUNTER (OUTPATIENT)
Dept: INFUSION THERAPY | Age: 71
Discharge: HOME | End: 2021-07-01
Payer: MEDICARE

## 2021-07-01 DIAGNOSIS — D51.0 VITAMIN B12 DEFICIENCY ANEMIA DUE TO INTRINSIC FACTOR DEFICIENCY: Primary | ICD-10-CM

## 2021-07-01 DIAGNOSIS — D50.0 IRON DEFICIENCY ANEMIA SECONDARY TO BLOOD LOSS (CHRONIC): Primary | ICD-10-CM

## 2021-07-01 DIAGNOSIS — D64.9 ANEMIA, UNSPECIFIED TYPE: ICD-10-CM

## 2021-07-01 LAB
BASOPHILS ABSOLUTE: 0.1 K/CU MM
BASOPHILS RELATIVE PERCENT: 0.9 % (ref 0–1)
DIFFERENTIAL TYPE: ABNORMAL
EOSINOPHILS ABSOLUTE: 0.2 K/CU MM
EOSINOPHILS RELATIVE PERCENT: 3.1 % (ref 0–3)
FERRITIN: 16 NG/ML (ref 15–150)
FOLATE: 5.6 NG/ML (ref 3.1–17.5)
HCT VFR BLD CALC: 35.7 % (ref 37–47)
HEMOGLOBIN: 11.4 GM/DL (ref 12.5–16)
IRON: 33 UG/DL (ref 37–145)
LYMPHOCYTES ABSOLUTE: 1.3 K/CU MM
LYMPHOCYTES RELATIVE PERCENT: 24 % (ref 24–44)
MCH RBC QN AUTO: 25.6 PG (ref 27–31)
MCHC RBC AUTO-ENTMCNC: 31.9 % (ref 32–36)
MCV RBC AUTO: 80 FL (ref 78–100)
MONOCYTES ABSOLUTE: 0.5 K/CU MM
MONOCYTES RELATIVE PERCENT: 9.2 % (ref 0–4)
PCT TRANSFERRIN: 10 % (ref 10–44)
PDW BLD-RTO: 19.6 % (ref 11.7–14.9)
PLATELET # BLD: 223 K/CU MM (ref 140–440)
PMV BLD AUTO: ABNORMAL FL (ref 7.5–11.1)
RBC # BLD: 4.46 M/CU MM (ref 4.2–5.4)
SEGMENTED NEUTROPHILS ABSOLUTE COUNT: 3.4 K/CU MM
SEGMENTED NEUTROPHILS RELATIVE PERCENT: 62.8 % (ref 36–66)
TOTAL IRON BINDING CAPACITY: 339 UG/DL (ref 250–450)
UNSATURATED IRON BINDING CAPACITY: 306 UG/DL (ref 110–370)
VITAMIN B-12: 485.2 PG/ML (ref 211–911)
WBC # BLD: 5.4 K/CU MM (ref 4–10.5)

## 2021-07-01 PROCEDURE — 6360000002 HC RX W HCPCS: Performed by: INTERNAL MEDICINE

## 2021-07-01 PROCEDURE — 82607 VITAMIN B-12: CPT

## 2021-07-01 PROCEDURE — 83540 ASSAY OF IRON: CPT

## 2021-07-01 PROCEDURE — 83550 IRON BINDING TEST: CPT

## 2021-07-01 PROCEDURE — 82746 ASSAY OF FOLIC ACID SERUM: CPT

## 2021-07-01 PROCEDURE — 82728 ASSAY OF FERRITIN: CPT

## 2021-07-01 PROCEDURE — 85025 COMPLETE CBC W/AUTO DIFF WBC: CPT

## 2021-07-01 PROCEDURE — 36415 COLL VENOUS BLD VENIPUNCTURE: CPT

## 2021-07-01 PROCEDURE — 96372 THER/PROPH/DIAG INJ SC/IM: CPT

## 2021-07-01 RX ORDER — CYANOCOBALAMIN 1000 UG/ML
1000 INJECTION INTRAMUSCULAR; SUBCUTANEOUS ONCE
Status: CANCELLED
Start: 2021-07-05

## 2021-07-01 RX ORDER — CYANOCOBALAMIN 1000 UG/ML
1000 INJECTION INTRAMUSCULAR; SUBCUTANEOUS ONCE
Status: COMPLETED
Start: 2021-07-01 | End: 2021-07-01

## 2021-07-01 RX ADMIN — CYANOCOBALAMIN 1000 MCG: 1000 INJECTION, SOLUTION INTRAMUSCULAR at 13:59

## 2021-07-02 DIAGNOSIS — D64.9 ANEMIA, UNSPECIFIED TYPE: Primary | ICD-10-CM

## 2021-07-02 RX ORDER — METHYLPREDNISOLONE SODIUM SUCCINATE 125 MG/2ML
125 INJECTION, POWDER, LYOPHILIZED, FOR SOLUTION INTRAMUSCULAR; INTRAVENOUS ONCE
Status: CANCELLED | OUTPATIENT
Start: 2021-07-09 | End: 2021-07-09

## 2021-07-02 RX ORDER — EPINEPHRINE 1 MG/ML
0.3 INJECTION, SOLUTION, CONCENTRATE INTRAVENOUS PRN
Status: CANCELLED | OUTPATIENT
Start: 2021-07-09

## 2021-07-02 RX ORDER — HEPARIN SODIUM (PORCINE) LOCK FLUSH IV SOLN 100 UNIT/ML 100 UNIT/ML
500 SOLUTION INTRAVENOUS PRN
Status: CANCELLED | OUTPATIENT
Start: 2021-07-09

## 2021-07-02 RX ORDER — ACETAMINOPHEN 325 MG/1
650 TABLET ORAL ONCE
Status: CANCELLED
Start: 2021-07-09 | End: 2021-07-09

## 2021-07-02 RX ORDER — SODIUM CHLORIDE 0.9 % (FLUSH) 0.9 %
5-40 SYRINGE (ML) INJECTION PRN
Status: CANCELLED | OUTPATIENT
Start: 2021-07-09

## 2021-07-02 RX ORDER — MEPERIDINE HYDROCHLORIDE 25 MG/ML
12.5 INJECTION INTRAMUSCULAR; INTRAVENOUS; SUBCUTANEOUS ONCE
Status: CANCELLED | OUTPATIENT
Start: 2021-07-09 | End: 2021-07-09

## 2021-07-02 RX ORDER — SODIUM CHLORIDE 9 MG/ML
INJECTION, SOLUTION INTRAVENOUS CONTINUOUS
Status: CANCELLED | OUTPATIENT
Start: 2021-07-09

## 2021-07-02 RX ORDER — DIPHENHYDRAMINE HYDROCHLORIDE 50 MG/ML
50 INJECTION INTRAMUSCULAR; INTRAVENOUS ONCE
Status: CANCELLED | OUTPATIENT
Start: 2021-07-09 | End: 2021-07-09

## 2021-07-02 RX ORDER — SODIUM CHLORIDE 9 MG/ML
25 INJECTION, SOLUTION INTRAVENOUS PRN
Status: CANCELLED | OUTPATIENT
Start: 2021-07-09

## 2021-07-02 RX ORDER — DEXAMETHASONE SODIUM PHOSPHATE 10 MG/ML
10 INJECTION INTRAMUSCULAR; INTRAVENOUS ONCE
Status: CANCELLED
Start: 2021-07-09 | End: 2021-07-09

## 2021-07-08 ENCOUNTER — OFFICE VISIT (OUTPATIENT)
Dept: ONCOLOGY | Age: 71
End: 2021-07-08

## 2021-07-08 ENCOUNTER — HOSPITAL ENCOUNTER (OUTPATIENT)
Dept: INFUSION THERAPY | Age: 71
Discharge: HOME | End: 2021-07-08
Payer: MEDICARE

## 2021-07-08 VITALS
HEART RATE: 68 BPM | SYSTOLIC BLOOD PRESSURE: 149 MMHG | RESPIRATION RATE: 18 BRPM | DIASTOLIC BLOOD PRESSURE: 74 MMHG | WEIGHT: 209 LBS | OXYGEN SATURATION: 96 % | TEMPERATURE: 96.5 F | BODY MASS INDEX: 34.82 KG/M2 | HEIGHT: 65 IN

## 2021-07-08 DIAGNOSIS — D50.0 IRON DEFICIENCY ANEMIA SECONDARY TO BLOOD LOSS (CHRONIC): Primary | ICD-10-CM

## 2021-07-08 PROCEDURE — G8400 PT W/DXA NO RESULTS DOC: HCPCS | Performed by: INTERNAL MEDICINE

## 2021-07-08 PROCEDURE — G8427 DOCREV CUR MEDS BY ELIG CLIN: HCPCS | Performed by: INTERNAL MEDICINE

## 2021-07-08 PROCEDURE — 1123F ACP DISCUSS/DSCN MKR DOCD: CPT | Performed by: INTERNAL MEDICINE

## 2021-07-08 PROCEDURE — 1090F PRES/ABSN URINE INCON ASSESS: CPT | Performed by: INTERNAL MEDICINE

## 2021-07-08 PROCEDURE — 4040F PNEUMOC VAC/ADMIN/RCVD: CPT | Performed by: INTERNAL MEDICINE

## 2021-07-08 PROCEDURE — 99211 OFF/OP EST MAY X REQ PHY/QHP: CPT

## 2021-07-08 PROCEDURE — 3017F COLORECTAL CA SCREEN DOC REV: CPT | Performed by: INTERNAL MEDICINE

## 2021-07-08 PROCEDURE — 99214 OFFICE O/P EST MOD 30 MIN: CPT | Performed by: INTERNAL MEDICINE

## 2021-07-08 PROCEDURE — 1036F TOBACCO NON-USER: CPT | Performed by: INTERNAL MEDICINE

## 2021-07-08 PROCEDURE — G8417 CALC BMI ABV UP PARAM F/U: HCPCS | Performed by: INTERNAL MEDICINE

## 2021-07-08 RX ORDER — MELOXICAM 7.5 MG/1
TABLET ORAL
COMMUNITY
Start: 2021-07-07

## 2021-07-08 NOTE — PROGRESS NOTES
[place] Rooming Questions  Patient: Feliciano Ann  MRN: [id number]    Date: 7/8/2021        1. Do you have any new issues?   no         2. Do you need any refills on medications?    no    3. Have you had any imaging done since your last visit? Yes-Mammo in media    4. Have you been hospitalized or seen in the emergency room since your last visit here?   no    5. Did the patient have a depression screening completed today?  No    No data recorded     PHQ-9 Given to (if applicable):               PHQ-9 Score (if applicable):                     [] Positive     []  Negative              Does question #9 need addressed (if applicable)                     [] Yes    []  No               Linda Silva CMA

## 2021-07-28 ENCOUNTER — HOSPITAL ENCOUNTER (OUTPATIENT)
Dept: INFUSION THERAPY | Age: 71
Setting detail: INFUSION SERIES
Discharge: HOME | End: 2021-07-28
Payer: MEDICARE

## 2021-07-28 VITALS
DIASTOLIC BLOOD PRESSURE: 77 MMHG | OXYGEN SATURATION: 97 % | SYSTOLIC BLOOD PRESSURE: 136 MMHG | HEART RATE: 74 BPM | RESPIRATION RATE: 16 BRPM | TEMPERATURE: 97.3 F

## 2021-07-28 DIAGNOSIS — D50.0 IRON DEFICIENCY ANEMIA SECONDARY TO BLOOD LOSS (CHRONIC): ICD-10-CM

## 2021-07-28 DIAGNOSIS — K90.9 INTESTINAL MALABSORPTION, UNSPECIFIED TYPE: Primary | ICD-10-CM

## 2021-07-28 PROCEDURE — 96366 THER/PROPH/DIAG IV INF ADDON: CPT

## 2021-07-28 PROCEDURE — 2580000003 HC RX 258: Performed by: INTERNAL MEDICINE

## 2021-07-28 PROCEDURE — 6370000000 HC RX 637 (ALT 250 FOR IP): Performed by: INTERNAL MEDICINE

## 2021-07-28 PROCEDURE — 96365 THER/PROPH/DIAG IV INF INIT: CPT

## 2021-07-28 PROCEDURE — 96374 THER/PROPH/DIAG INJ IV PUSH: CPT

## 2021-07-28 PROCEDURE — 6360000002 HC RX W HCPCS: Performed by: INTERNAL MEDICINE

## 2021-07-28 PROCEDURE — 99211 OFF/OP EST MAY X REQ PHY/QHP: CPT

## 2021-07-28 RX ORDER — METHYLPREDNISOLONE SODIUM SUCCINATE 125 MG/2ML
125 INJECTION, POWDER, LYOPHILIZED, FOR SOLUTION INTRAMUSCULAR; INTRAVENOUS ONCE
Status: CANCELLED | OUTPATIENT
Start: 2021-07-28 | End: 2021-07-28

## 2021-07-28 RX ORDER — SODIUM CHLORIDE 9 MG/ML
INJECTION, SOLUTION INTRAVENOUS CONTINUOUS
Status: CANCELLED | OUTPATIENT
Start: 2021-07-28

## 2021-07-28 RX ORDER — SODIUM CHLORIDE 9 MG/ML
25 INJECTION, SOLUTION INTRAVENOUS PRN
Status: CANCELLED | OUTPATIENT
Start: 2021-07-28

## 2021-07-28 RX ORDER — MEPERIDINE HYDROCHLORIDE 25 MG/ML
12.5 INJECTION INTRAMUSCULAR; INTRAVENOUS; SUBCUTANEOUS ONCE
Status: CANCELLED | OUTPATIENT
Start: 2021-07-28 | End: 2021-07-28

## 2021-07-28 RX ORDER — DEXAMETHASONE SODIUM PHOSPHATE 10 MG/ML
10 INJECTION INTRAMUSCULAR; INTRAVENOUS ONCE
Status: CANCELLED
Start: 2021-07-28 | End: 2021-07-28

## 2021-07-28 RX ORDER — SODIUM CHLORIDE 0.9 % (FLUSH) 0.9 %
5-40 SYRINGE (ML) INJECTION PRN
Status: DISCONTINUED | OUTPATIENT
Start: 2021-07-28 | End: 2021-07-28

## 2021-07-28 RX ORDER — HEPARIN SODIUM (PORCINE) LOCK FLUSH IV SOLN 100 UNIT/ML 100 UNIT/ML
500 SOLUTION INTRAVENOUS PRN
Status: CANCELLED | OUTPATIENT
Start: 2021-07-28

## 2021-07-28 RX ORDER — HEPARIN SODIUM (PORCINE) LOCK FLUSH IV SOLN 100 UNIT/ML 100 UNIT/ML
500 SOLUTION INTRAVENOUS PRN
Status: DISCONTINUED | OUTPATIENT
Start: 2021-07-28 | End: 2021-07-28

## 2021-07-28 RX ORDER — DEXAMETHASONE SODIUM PHOSPHATE 4 MG/ML
10 INJECTION, SOLUTION INTRA-ARTICULAR; INTRALESIONAL; INTRAMUSCULAR; INTRAVENOUS; SOFT TISSUE ONCE
Status: COMPLETED | OUTPATIENT
Start: 2021-07-28 | End: 2021-07-28

## 2021-07-28 RX ORDER — DIPHENHYDRAMINE HYDROCHLORIDE 50 MG/ML
50 INJECTION INTRAMUSCULAR; INTRAVENOUS ONCE
Status: CANCELLED | OUTPATIENT
Start: 2021-07-28 | End: 2021-07-28

## 2021-07-28 RX ORDER — EPINEPHRINE 1 MG/ML
0.3 INJECTION, SOLUTION, CONCENTRATE INTRAVENOUS PRN
Status: CANCELLED | OUTPATIENT
Start: 2021-07-28

## 2021-07-28 RX ORDER — ACETAMINOPHEN 325 MG/1
650 TABLET ORAL ONCE
Status: CANCELLED
Start: 2021-07-28 | End: 2021-07-28

## 2021-07-28 RX ORDER — SODIUM CHLORIDE 0.9 % (FLUSH) 0.9 %
5-40 SYRINGE (ML) INJECTION PRN
Status: CANCELLED | OUTPATIENT
Start: 2021-07-28

## 2021-07-28 RX ORDER — ACETAMINOPHEN 325 MG/1
650 TABLET ORAL ONCE
Status: COMPLETED | OUTPATIENT
Start: 2021-07-28 | End: 2021-07-28

## 2021-07-28 RX ADMIN — SODIUM CHLORIDE, PRESERVATIVE FREE 10 ML: 5 INJECTION INTRAVENOUS at 09:12

## 2021-07-28 RX ADMIN — SODIUM CHLORIDE 25 MG: 9 INJECTION, SOLUTION INTRAVENOUS at 09:15

## 2021-07-28 RX ADMIN — SODIUM CHLORIDE, PRESERVATIVE FREE 10 ML: 5 INJECTION INTRAVENOUS at 09:05

## 2021-07-28 RX ADMIN — SODIUM CHLORIDE 975 MG: 9 INJECTION, SOLUTION INTRAVENOUS at 09:40

## 2021-07-28 RX ADMIN — ACETAMINOPHEN 650 MG: 325 TABLET ORAL at 09:10

## 2021-07-28 RX ADMIN — DEXAMETHASONE SODIUM PHOSPHATE 10 MG: 4 INJECTION, SOLUTION INTRAMUSCULAR; INTRAVENOUS at 09:10

## 2021-07-28 ASSESSMENT — PAIN SCALES - GENERAL: PAINLEVEL_OUTOF10: 0

## 2021-07-28 NOTE — PLAN OF CARE
To unit room  5 for Infed. Orientated to unit. Procedure and plan of care explained. Questions answered. Understanding verbalized.

## 2021-08-04 ENCOUNTER — TELEPHONE (OUTPATIENT)
Dept: INFUSION THERAPY | Age: 71
End: 2021-08-04

## 2021-08-04 NOTE — TELEPHONE ENCOUNTER
Called to ask pt if  Dr. Susy Russell gave her an Rx for B12 sublingual at her last OV instead of continuing with her B12 injections; pt states yes.

## 2021-09-02 ENCOUNTER — HOSPITAL ENCOUNTER (OUTPATIENT)
Dept: INFUSION THERAPY | Age: 71
Discharge: HOME | End: 2021-09-02
Payer: MEDICARE

## 2021-09-02 DIAGNOSIS — D50.0 IRON DEFICIENCY ANEMIA SECONDARY TO BLOOD LOSS (CHRONIC): ICD-10-CM

## 2021-09-02 LAB
BASOPHILS ABSOLUTE: 0 K/CU MM
BASOPHILS RELATIVE PERCENT: 0.7 % (ref 0–1)
DIFFERENTIAL TYPE: ABNORMAL
EOSINOPHILS ABSOLUTE: 0.1 K/CU MM
EOSINOPHILS RELATIVE PERCENT: 2.6 % (ref 0–3)
FERRITIN: 132 NG/ML (ref 15–150)
FOLATE: 4 NG/ML (ref 3.1–17.5)
HCT VFR BLD CALC: 38.7 % (ref 37–47)
HEMOGLOBIN: 12.6 GM/DL (ref 12.5–16)
IRON: 60 UG/DL (ref 37–145)
LYMPHOCYTES ABSOLUTE: 1.2 K/CU MM
LYMPHOCYTES RELATIVE PERCENT: 27.1 % (ref 24–44)
MCH RBC QN AUTO: 27.9 PG (ref 27–31)
MCHC RBC AUTO-ENTMCNC: 32.6 % (ref 32–36)
MCV RBC AUTO: 85.6 FL (ref 78–100)
MONOCYTES ABSOLUTE: 0.4 K/CU MM
MONOCYTES RELATIVE PERCENT: 9 % (ref 0–4)
PCT TRANSFERRIN: 24 % (ref 10–44)
PDW BLD-RTO: 20.9 % (ref 11.7–14.9)
PLATELET # BLD: 166 K/CU MM (ref 140–440)
PMV BLD AUTO: 10.6 FL (ref 7.5–11.1)
RBC # BLD: 4.52 M/CU MM (ref 4.2–5.4)
SEGMENTED NEUTROPHILS ABSOLUTE COUNT: 2.6 K/CU MM
SEGMENTED NEUTROPHILS RELATIVE PERCENT: 60.6 % (ref 36–66)
TOTAL IRON BINDING CAPACITY: 245 UG/DL (ref 250–450)
UNSATURATED IRON BINDING CAPACITY: 185 UG/DL (ref 110–370)
VITAMIN B-12: 550.3 PG/ML (ref 211–911)
WBC # BLD: 4.2 K/CU MM (ref 4–10.5)

## 2021-09-02 PROCEDURE — 83550 IRON BINDING TEST: CPT

## 2021-09-02 PROCEDURE — 85025 COMPLETE CBC W/AUTO DIFF WBC: CPT

## 2021-09-02 PROCEDURE — 83540 ASSAY OF IRON: CPT

## 2021-09-02 PROCEDURE — 82746 ASSAY OF FOLIC ACID SERUM: CPT

## 2021-09-02 PROCEDURE — 82728 ASSAY OF FERRITIN: CPT

## 2021-09-02 PROCEDURE — 82607 VITAMIN B-12: CPT

## 2021-09-02 PROCEDURE — 36415 COLL VENOUS BLD VENIPUNCTURE: CPT

## 2021-09-08 ENCOUNTER — OFFICE VISIT (OUTPATIENT)
Dept: ONCOLOGY | Age: 71
End: 2021-09-08
Payer: MEDICARE

## 2021-09-08 ENCOUNTER — HOSPITAL ENCOUNTER (OUTPATIENT)
Dept: INFUSION THERAPY | Age: 71
Discharge: HOME | End: 2021-09-08
Payer: MEDICARE

## 2021-09-08 VITALS
SYSTOLIC BLOOD PRESSURE: 105 MMHG | TEMPERATURE: 97.5 F | WEIGHT: 203.2 LBS | RESPIRATION RATE: 18 BRPM | HEIGHT: 65 IN | HEART RATE: 107 BPM | OXYGEN SATURATION: 96 % | DIASTOLIC BLOOD PRESSURE: 65 MMHG | BODY MASS INDEX: 33.85 KG/M2

## 2021-09-08 DIAGNOSIS — D50.0 IRON DEFICIENCY ANEMIA SECONDARY TO BLOOD LOSS (CHRONIC): Primary | ICD-10-CM

## 2021-09-08 PROCEDURE — 99213 OFFICE O/P EST LOW 20 MIN: CPT | Performed by: NURSE PRACTITIONER

## 2021-09-08 PROCEDURE — 3017F COLORECTAL CA SCREEN DOC REV: CPT | Performed by: NURSE PRACTITIONER

## 2021-09-08 PROCEDURE — G8400 PT W/DXA NO RESULTS DOC: HCPCS | Performed by: NURSE PRACTITIONER

## 2021-09-08 PROCEDURE — G8417 CALC BMI ABV UP PARAM F/U: HCPCS | Performed by: NURSE PRACTITIONER

## 2021-09-08 PROCEDURE — 1123F ACP DISCUSS/DSCN MKR DOCD: CPT | Performed by: NURSE PRACTITIONER

## 2021-09-08 PROCEDURE — 1090F PRES/ABSN URINE INCON ASSESS: CPT | Performed by: NURSE PRACTITIONER

## 2021-09-08 PROCEDURE — G8427 DOCREV CUR MEDS BY ELIG CLIN: HCPCS | Performed by: NURSE PRACTITIONER

## 2021-09-08 PROCEDURE — 1036F TOBACCO NON-USER: CPT | Performed by: NURSE PRACTITIONER

## 2021-09-08 PROCEDURE — 4040F PNEUMOC VAC/ADMIN/RCVD: CPT | Performed by: NURSE PRACTITIONER

## 2021-09-08 PROCEDURE — 99211 OFF/OP EST MAY X REQ PHY/QHP: CPT

## 2021-09-08 NOTE — PROGRESS NOTES
MA Rooming Questions  Patient: Merlin Mayans  MRN: [id number]    Date: 9/8/2021        1. Do you have any new issues? yes - TIRED , PAIN IN LEGS AND BACK         2. Do you need any refills on medications?    no    3. Have you had any imaging done since your last visit?   no    4. Have you been hospitalized or seen in the emergency room since your last visit here?   no    5. Did the patient have a depression screening completed today?  No    No data recorded     PHQ-9 Given to (if applicable):               PHQ-9 Score (if applicable):                     [] Positive     []  Negative              Does question #9 need addressed (if applicable)                     [] Yes    []  No               Avery Vivar CMA

## 2021-09-08 NOTE — PROGRESS NOTES
Patient Name: Brooke Remy  Patient : 1950  Patient MRN: [id number]     Primary Oncologist: Heriberto Mccartney MD  Referring Provider: Chilo Isaacs MD     Date of Service: 2021      Chief Complaint:   Chief Complaint   Patient presents with    Follow-up     She came in for follow-up visit. Problem List:      Iron deficiency anemia secondary to blood loss (chronic)     Vitamin B12 deficiency anemia     Personal history of other diseases of the digestive system     HPI:   Nadeen العلي is a pleasant 42-year-old  female patient with an underlying history of anemia of iron deficiency, who was referred for consideration of iron infusion. She received parenteral iron in  without any problems. She was seen by Dr. Fernandez Waterman previously and had colonoscopy and upper endoscopic study more than five years ago. She denied any melena or hematochezia. She received blood transfusion in 2017. At that time, hemoglobin was 7. Stool occult was several years back. In 2017 CMP was within normal limits. White cell count was 6.2, hemoglobin 7.2, hematocrit 25, MCV 59.2, platelet 210. Ferritin was 6, B12 266, TSH 2.28. She tried iron pills, which caused her to have irritation of the stomach and constipation. Blood test in 2017 revealed iron 21. BMP was within normal limits. White cell count was 5.4, hemoglobin 9.2, hematocrit 29.7, MCV 71.1, platelet 449, TSH 3.846. Ferritin was 9 and B12 was 258. She had left cataract surgery on 2017. She had right eye cataract surgeryIn 2018 and hematoma below the right eye. She received iron infusion in 2018 with mild improvement of hemoglobin and ferritin. Iron saturation remained low. She is on omeprazole for acid reflux. Blood tests in 2018 revealed hemoglobin 9.6 MCV 81.5 ferritin 38. B12 was borderline therefore I started B12 injection SQ monthly. She could not tolerate iron by mouth.    In 2018 anemia was corrected. She had upper, lower endoscopic study and capsule endoscopic study in first quarter of 2018. She was prescribed Iron pill causing stomach discomfort. I advise to take QOD. She was found to have hiatal hernia and small gastric ulcer. She is on omeprazole. Labs in May 2019 showed ferritin at 11, TSH 0.377, white cell 4.0, hemoglobin 10.7, MCV 71.6, platelet 867. CMP was unremarkable. She received blood transfusion in January 2019. She stopped B12 injection in April or March 2019. She takes iron pill every 2 days due to constipation. Reportedly mammogram in December 2018 was benign. She is on antidepressant. She had Infed in August 2019. Ferritin improved to ~30 and Hg ~13.6. She takes prenatal vitamin with Iron every other day. Labs in December 2019 were reviewed. Hg and Ferritin improved. She started B-12 injection in December 2019. Reports she has resumed B12. She received INFeD in November 2020. She was agreeable to resume the B12 shot. She has been followed by Dr. Julieth Mason for the chronic knee pain. She received cortical cortisone shot and gel shot. On March 4, 2021 she had B-12 injection. Reportedly colonoscopy is not due until 2025. She is agreeable to try B12 sublingual.  Labs in May 2021 showed improvement of the hemoglobin at 11.9, ferritin at 54, B12 395.9. On September 8, 2021 she presented for scheduled follow up. She had iron infusion 7/21. She reports energy has somewhat improved. 9/2 ferritin 132, iron 60, TIBC 245, Transferrin % 24. She has stopped taking meloxicam. But has not followed with PCP for replacement medication. We did discuss topicals, she declines pain management. She is to have repeat injection to left knee around October. She is encouraged to follow up with GI. She denies acute pain or depression, no nausea, vomiting or diarrhea, no melena or hematochezia, no dysuria, hematuria.      Past Medical History  Allergic rhinitis, anemia of iron deficiency, diverticulosis of the colon, status post colonoscopy in December 2011, fibromyalgia, GERD, dyslipidemia, hypothyroidism, vitamin D deficiency. Surgical History   Breast biopsy, cholecystectomy in 2006 by Dr. Aleah Leon. Review of Systems: \"Per interval history; otherwise 10 point ROS is negative. \"     Vital Signs:  /65 (Site: Left Upper Arm, Position: Sitting, Cuff Size: Large Adult)   Pulse 107   Temp 97.5 °F (36.4 °C) (Temporal)   Resp 18   Ht 5' 4.5\" (1.638 m)   Wt 203 lb 3.2 oz (92.2 kg)   SpO2 96%   BMI 34.34 kg/m²     Physical Exam:    CONSTITUTIONAL: awake, alert, cooperative, no apparent distress   EYES:sclera clear and conjunctiva pallor. ENT: Normocephalic, without obvious abnormality, atraumatic  NECK: supple, symmetrical  HEMATOLOGIC/LYMPHATIC: no cervical, supraclavicular or axillary lymphadenopathy   LUNGS: no increased work of breathing and clear to auscultation   CARDIOVASCULAR: regular rate and rhythm, normal S1 and S2, no murmur  ABDOMEN: normal bowel sound, soft, non-distended, non-tender, no masses palpated, no hepatosplenomegaly   MUSCULOSKELETAL: full range of motion noted, tone is normal  NEUROLOGIC: awake, alert, oriented to name, place and time. CN II -XII grossly intact. SKIN: Normal skin color, texture, turgor and no jaundice. appears intact   EXTREMITIES: no LE edema. No cyanosis.      Labs:    Hematology:  Lab Results   Component Value Date    WBC 4.2 09/02/2021    RBC 4.52 09/02/2021    HGB 12.6 09/02/2021    HCT 38.7 09/02/2021    MCV 85.6 09/02/2021    MCH 27.9 09/02/2021    MCHC 32.6 09/02/2021    RDW 20.9 (H) 09/02/2021     09/02/2021    MPV 10.6 09/02/2021    SEGSPCT 60.6 09/02/2021    EOSRELPCT 2.6 09/02/2021    BASOPCT 0.7 09/02/2021    LYMPHOPCT 27.1 09/02/2021    MONOPCT 9.0 (H) 09/02/2021    SEGSABS 2.6 09/02/2021    EOSABS 0.1 09/02/2021    BASOSABS 0.0 09/02/2021    LYMPHSABS 1.2 09/02/2021    MONOSABS 0.4 09/02/2021    DIFFTYPE

## 2021-09-09 ENCOUNTER — TELEPHONE (OUTPATIENT)
Dept: ONCOLOGY | Age: 71
End: 2021-09-09

## 2021-09-09 NOTE — TELEPHONE ENCOUNTER
I called patient to advise Cherrie Bledsoe only does the exercise program for cancer patients.   I asked the patient to call and advise she would like a referral for PT.

## 2021-11-09 NOTE — PROGRESS NOTES
Patient Name: Mary Cintron  Patient : 1950  Patient MRN: [id number]     Primary Oncologist: Karime Jones MD  Referring Provider: Mariajose Roa MD     Date of Service: 2021      Chief Complaint:   Chief Complaint   Patient presents with    Follow-up     She came in for follow-up visit. Problem List:      Iron deficiency anemia secondary to blood loss (chronic)     Vitamin B12 deficiency anemia     Personal history of other diseases of the digestive system     HPI:   Taiwo Meza is a pleasant 61-year-old  female patient with an underlying history of anemia of iron deficiency, who was referred for consideration of iron infusion. She received parenteral iron in  without any problems. She was seen by Dr. Estuardo Lilly previously and had colonoscopy and upper endoscopic study more than five years ago. She denied any melena or hematochezia. She received blood transfusion in 2017. At that time, hemoglobin was 7. Stool occult was several years back. In 2017 CMP was within normal limits. White cell count was 6.2, hemoglobin 7.2, hematocrit 25, MCV 59.2, platelet 759. Ferritin was 6, B12 266, TSH 2.28. She tried iron pills, which caused her to have irritation of the stomach and constipation. Blood test in 2017 revealed iron 21. BMP was within normal limits. White cell count was 5.4, hemoglobin 9.2, hematocrit 29.7, MCV 71.1, platelet 765, TSH 3.844. Ferritin was 9 and B12 was 258. She had left cataract surgery on 2017. She had right eye cataract surgeryIn 2018 and hematoma below the right eye. She received iron infusion in 2018 with mild improvement of hemoglobin and ferritin. Iron saturation remained low. She is on omeprazole for acid reflux. Blood tests in 2018 revealed hemoglobin 9.6 MCV 81.5 ferritin 38. B12 was borderline therefore I started B12 injection SQ monthly. She could not tolerate iron by mouth.    In 2018 anemia was corrected. She had upper, lower endoscopic study and capsule endoscopic study in first quarter of 2018. She was prescribed Iron pill causing stomach discomfort. I advise to take QOD. She was found to have hiatal hernia and small gastric ulcer. She is on omeprazole. Labs in May 2019 showed ferritin at 11, TSH 0.377, white cell 4.0, hemoglobin 10.7, MCV 71.6, platelet 263. CMP was unremarkable. She received blood transfusion in January 2019. She stopped B12 injection in April or March 2019. She takes iron pill every 2 days due to constipation. Reportedly mammogram in December 2018 was benign. She is on antidepressant. She had Infed in August 2019. Ferritin improved to ~30 and Hg ~13.6. She takes prenatal vitamin with Iron every other day. Labs in December 2019 were reviewed. Hg and Ferritin improved. She started B-12 injection in December 2019. Reports she has resumed B12. She received INFeD in November 2020. She was agreeable to resume the B12 shot. She has been followed by Dr. Sol William for the chronic knee pain. She received cortical cortisone shot and gel shot. On March 4, 2021 she had B-12 injection. Reportedly colonoscopy is not due until 2025. She is agreeable to try B12 sublingual.  Labs in May 2021 showed improvement of the hemoglobin at 11.9, ferritin at 54, B12 395.9. She had iron infusion 7/21. She reports energy has somewhat improved. 9/2 ferritin 132, iron 60, TIBC 245, Transferrin % 24. On December 9, 2021 she came in for follow up visit. She is scheduled for Iron infusion on 12/23/2021. Labs on 12/7/2021 showed recurrent PRANAV. B-12 was 365.9 and ferritin 18. I strongly recommend to follow up with GI. She has seen Dr Larissa Wang before. She takes meloxicam PRN. She feel tired easily. She has palpitation. No acute pain. Denied any nausea, vomiting or diarrhea. No fever or chills. No chest pain, shortness of breath. No headache or dizzy spell.   No specific bone pain. No melena or hematochezia. Denied any dysuria or hematuria. Past Medical History  Allergic rhinitis, anemia of iron deficiency, diverticulosis of the colon, status post colonoscopy in December 2011, fibromyalgia, GERD, dyslipidemia, hypothyroidism, vitamin D deficiency. Surgical History   Breast biopsy, cholecystectomy in 2006 by Dr. Farhan Guevara. Review of Systems: \"Per interval history; otherwise 10 point ROS is negative. \"     Vital Signs:  BP (!) 171/74 (Site: Right Upper Arm, Position: Sitting, Cuff Size: Large Adult)   Pulse 80   Temp 96.9 °F (36.1 °C) (Temporal)   Ht 5' 4.5\" (1.638 m)   Wt 206 lb 12.8 oz (93.8 kg)   SpO2 94%   BMI 34.95 kg/m²     Physical Exam:    CONSTITUTIONAL: awake, alert, cooperative, no apparent distress   EYES:sclera clear and pale conjunctiva. ENT: Normocephalic, without obvious abnormality, atraumatic  NECK: supple, symmetrical  HEMATOLOGIC/LYMPHATIC: no cervical, supraclavicular or axillary lymphadenopathy   LUNGS: no increased work of breathing and clear to auscultation   CARDIOVASCULAR: regular rate and rhythm, normal S1 and S2, no murmur  ABDOMEN: normal bowel sound, soft, non-distended, non-tender, no masses palpated, no hepatosplenomegaly   MUSCULOSKELETAL: full range of motion noted, tone is normal  NEUROLOGIC: awake, alert, oriented to name, place and time. Motor is grossly intact. CN II -XII grossly intact. SKIN: Normal skin color, texture, turgor and no jaundice. appears intact   EXTREMITIES: no LE edema or cyanosis.      Labs:    Hematology:  Lab Results   Component Value Date    WBC 4.9 12/07/2021    RBC 4.02 (L) 12/07/2021    HGB 11.0 (L) 12/07/2021    HCT 34.3 (L) 12/07/2021    MCV 85.3 12/07/2021    MCH 27.4 12/07/2021    MCHC 32.1 12/07/2021    RDW 14.8 12/07/2021     12/07/2021    MPV 11.7 (H) 12/07/2021    SEGSPCT 59.7 12/07/2021    EOSRELPCT 7.0 (H) 12/07/2021    BASOPCT 0.4 12/07/2021    LYMPHOPCT 23.0 (L) 12/07/2021 hemoglobin, iron and B12. Labs in July 2021 showed recurrent PRANAV. She had Iron infusion in July 2021. Labs in December 2021 showed recurrent PRANAV. Todd Schroeder I recommend to follow up with GI. She is on meloxicam PRN. I will recheck Iron study and CBC prior to next OV. 2. Due to low B-12 level, she started on monthly SQ B-12 injection. She stopped B-12 injection in March or April 2019. B-12 in December 2019 was low. She resumed B-12 injection in December 2019. She resumed B12 injection in December 2020. I gave her the prescription of sublingual B12. She responds to oral B-12 SL.  B-12 on 12/7/2021 was 365.9    3. I advised her to keep her other age-appropriate cancer screening up to date. Mammogram in December 2018 was benign. I remind her about mammogram.    4. I advise to exercise. She could have chronic fatigue. She had Covid vaccine. RTC in 8 weeks sooner. All of her questions have been answered for today. Recent imaging and labs were reviewed and discussed with the patient.

## 2021-12-02 ENCOUNTER — HOSPITAL ENCOUNTER (OUTPATIENT)
Dept: INFUSION THERAPY | Age: 71
Discharge: HOME | End: 2021-12-02
Payer: MEDICARE

## 2021-12-02 DIAGNOSIS — D50.0 IRON DEFICIENCY ANEMIA SECONDARY TO BLOOD LOSS (CHRONIC): ICD-10-CM

## 2021-12-02 LAB
ALBUMIN SERPL-MCNC: 4.7 GM/DL (ref 3.4–5)
ALP BLD-CCNC: 100 IU/L (ref 40–129)
ALT SERPL-CCNC: 9 U/L (ref 10–40)
ANION GAP SERPL CALCULATED.3IONS-SCNC: 11 MMOL/L (ref 4–16)
AST SERPL-CCNC: 15 IU/L (ref 15–37)
BASOPHILS ABSOLUTE: 0 K/CU MM
BASOPHILS RELATIVE PERCENT: 0.5 % (ref 0–1)
BILIRUB SERPL-MCNC: 0.1 MG/DL (ref 0–1)
BUN BLDV-MCNC: 23 MG/DL (ref 6–23)
CALCIUM SERPL-MCNC: 9 MG/DL (ref 8.3–10.6)
CHLORIDE BLD-SCNC: 105 MMOL/L (ref 99–110)
CO2: 24 MMOL/L (ref 21–32)
CREAT SERPL-MCNC: 0.9 MG/DL (ref 0.6–1.1)
DIFFERENTIAL TYPE: ABNORMAL
EOSINOPHILS ABSOLUTE: 0.2 K/CU MM
EOSINOPHILS RELATIVE PERCENT: 2.5 % (ref 0–3)
FOLATE: 3.7 NG/ML (ref 3.1–17.5)
GFR AFRICAN AMERICAN: >60 ML/MIN/1.73M2
GFR NON-AFRICAN AMERICAN: >60 ML/MIN/1.73M2
GLUCOSE BLD-MCNC: 105 MG/DL (ref 70–99)
HCT VFR BLD CALC: 33.9 % (ref 37–47)
HEMOGLOBIN: 11 GM/DL (ref 12.5–16)
IRON: 27 UG/DL (ref 37–145)
LYMPHOCYTES ABSOLUTE: 1.2 K/CU MM
LYMPHOCYTES RELATIVE PERCENT: 15.6 % (ref 24–44)
MCH RBC QN AUTO: 27.7 PG (ref 27–31)
MCHC RBC AUTO-ENTMCNC: 32.4 % (ref 32–36)
MCV RBC AUTO: 85.4 FL (ref 78–100)
MONOCYTES ABSOLUTE: 0.7 K/CU MM
MONOCYTES RELATIVE PERCENT: 9.5 % (ref 0–4)
PCT TRANSFERRIN: 8 % (ref 10–44)
PDW BLD-RTO: 14.9 % (ref 11.7–14.9)
PLATELET # BLD: 207 K/CU MM (ref 140–440)
PMV BLD AUTO: 12.1 FL (ref 7.5–11.1)
POTASSIUM SERPL-SCNC: 4.2 MMOL/L (ref 3.5–5.1)
RBC # BLD: 3.97 M/CU MM (ref 4.2–5.4)
SEGMENTED NEUTROPHILS ABSOLUTE COUNT: 5.5 K/CU MM
SEGMENTED NEUTROPHILS RELATIVE PERCENT: 71.9 % (ref 36–66)
SODIUM BLD-SCNC: 140 MMOL/L (ref 135–145)
TOTAL IRON BINDING CAPACITY: 335 UG/DL (ref 250–450)
TOTAL PROTEIN: 6.9 GM/DL (ref 6.4–8.2)
UNSATURATED IRON BINDING CAPACITY: 308 UG/DL (ref 110–370)
VITAMIN B-12: 322.2 PG/ML (ref 211–911)
WBC # BLD: 7.6 K/CU MM (ref 4–10.5)

## 2021-12-02 PROCEDURE — 80053 COMPREHEN METABOLIC PANEL: CPT

## 2021-12-02 PROCEDURE — 83550 IRON BINDING TEST: CPT

## 2021-12-02 PROCEDURE — 82746 ASSAY OF FOLIC ACID SERUM: CPT

## 2021-12-02 PROCEDURE — 83540 ASSAY OF IRON: CPT

## 2021-12-02 PROCEDURE — 85025 COMPLETE CBC W/AUTO DIFF WBC: CPT

## 2021-12-02 PROCEDURE — 36415 COLL VENOUS BLD VENIPUNCTURE: CPT

## 2021-12-02 PROCEDURE — 82728 ASSAY OF FERRITIN: CPT

## 2021-12-02 PROCEDURE — 82607 VITAMIN B-12: CPT

## 2021-12-06 DIAGNOSIS — D50.0 IRON DEFICIENCY ANEMIA SECONDARY TO BLOOD LOSS (CHRONIC): Primary | ICD-10-CM

## 2021-12-06 LAB — FERRITIN: 19 NG/ML (ref 15–150)

## 2021-12-06 RX ORDER — ACETAMINOPHEN 325 MG/1
650 TABLET ORAL
Status: CANCELLED | OUTPATIENT
Start: 2021-12-06

## 2021-12-06 RX ORDER — SODIUM CHLORIDE 9 MG/ML
INJECTION, SOLUTION INTRAVENOUS CONTINUOUS
Status: CANCELLED | OUTPATIENT
Start: 2021-12-06

## 2021-12-06 RX ORDER — ALBUTEROL SULFATE 90 UG/1
4 AEROSOL, METERED RESPIRATORY (INHALATION) PRN
Status: CANCELLED | OUTPATIENT
Start: 2021-12-06

## 2021-12-06 RX ORDER — MEPERIDINE HYDROCHLORIDE 50 MG/ML
12.5 INJECTION INTRAMUSCULAR; INTRAVENOUS; SUBCUTANEOUS PRN
Status: CANCELLED | OUTPATIENT
Start: 2021-12-06

## 2021-12-06 RX ORDER — SODIUM CHLORIDE 0.9 % (FLUSH) 0.9 %
5-40 SYRINGE (ML) INJECTION PRN
Status: CANCELLED | OUTPATIENT
Start: 2021-12-06

## 2021-12-06 RX ORDER — ONDANSETRON 2 MG/ML
8 INJECTION INTRAMUSCULAR; INTRAVENOUS
Status: CANCELLED | OUTPATIENT
Start: 2021-12-06

## 2021-12-06 RX ORDER — HEPARIN SODIUM (PORCINE) LOCK FLUSH IV SOLN 100 UNIT/ML 100 UNIT/ML
500 SOLUTION INTRAVENOUS PRN
Status: CANCELLED | OUTPATIENT
Start: 2021-12-06

## 2021-12-06 RX ORDER — DEXAMETHASONE SODIUM PHOSPHATE 10 MG/ML
10 INJECTION INTRAMUSCULAR; INTRAVENOUS ONCE
Status: CANCELLED
Start: 2021-12-06 | End: 2021-12-06

## 2021-12-06 RX ORDER — EPINEPHRINE 1 MG/ML
0.3 INJECTION, SOLUTION, CONCENTRATE INTRAVENOUS PRN
Status: CANCELLED | OUTPATIENT
Start: 2021-12-06

## 2021-12-06 RX ORDER — SODIUM CHLORIDE 9 MG/ML
25 INJECTION, SOLUTION INTRAVENOUS PRN
Status: CANCELLED | OUTPATIENT
Start: 2021-12-06

## 2021-12-06 RX ORDER — DIPHENHYDRAMINE HYDROCHLORIDE 50 MG/ML
50 INJECTION INTRAMUSCULAR; INTRAVENOUS
Status: CANCELLED | OUTPATIENT
Start: 2021-12-06

## 2021-12-06 NOTE — PROGRESS NOTES
Spoke with pt to let her know that she needs an iron infusion but needs to have her Ferritin drawn. Pt agreed and will come tomorrow at 2 to have her ferritin drawn.

## 2021-12-07 ENCOUNTER — HOSPITAL ENCOUNTER (OUTPATIENT)
Dept: INFUSION THERAPY | Age: 71
Discharge: HOME | End: 2021-12-07
Payer: MEDICARE

## 2021-12-07 DIAGNOSIS — D50.0 IRON DEFICIENCY ANEMIA SECONDARY TO BLOOD LOSS (CHRONIC): ICD-10-CM

## 2021-12-07 LAB
BASOPHILS ABSOLUTE: 0 K/CU MM
BASOPHILS RELATIVE PERCENT: 0.4 % (ref 0–1)
DIFFERENTIAL TYPE: ABNORMAL
EOSINOPHILS ABSOLUTE: 0.3 K/CU MM
EOSINOPHILS RELATIVE PERCENT: 7 % (ref 0–3)
FERRITIN: 18 NG/ML (ref 15–150)
FOLATE: 4.5 NG/ML (ref 3.1–17.5)
HCT VFR BLD CALC: 34.3 % (ref 37–47)
HEMOGLOBIN: 11 GM/DL (ref 12.5–16)
IRON: 38 UG/DL (ref 37–145)
LYMPHOCYTES ABSOLUTE: 1.1 K/CU MM
LYMPHOCYTES RELATIVE PERCENT: 23 % (ref 24–44)
MCH RBC QN AUTO: 27.4 PG (ref 27–31)
MCHC RBC AUTO-ENTMCNC: 32.1 % (ref 32–36)
MCV RBC AUTO: 85.3 FL (ref 78–100)
MONOCYTES ABSOLUTE: 0.5 K/CU MM
MONOCYTES RELATIVE PERCENT: 9.9 % (ref 0–4)
PCT TRANSFERRIN: 11 % (ref 10–44)
PDW BLD-RTO: 14.8 % (ref 11.7–14.9)
PLATELET # BLD: 231 K/CU MM (ref 140–440)
PMV BLD AUTO: 11.7 FL (ref 7.5–11.1)
RBC # BLD: 4.02 M/CU MM (ref 4.2–5.4)
SEGMENTED NEUTROPHILS ABSOLUTE COUNT: 2.9 K/CU MM
SEGMENTED NEUTROPHILS RELATIVE PERCENT: 59.7 % (ref 36–66)
TOTAL IRON BINDING CAPACITY: 342 UG/DL (ref 250–450)
UNSATURATED IRON BINDING CAPACITY: 304 UG/DL (ref 110–370)
VITAMIN B-12: 365.9 PG/ML (ref 211–911)
WBC # BLD: 4.9 K/CU MM (ref 4–10.5)

## 2021-12-07 PROCEDURE — 83550 IRON BINDING TEST: CPT

## 2021-12-07 PROCEDURE — 82746 ASSAY OF FOLIC ACID SERUM: CPT

## 2021-12-07 PROCEDURE — 83540 ASSAY OF IRON: CPT

## 2021-12-07 PROCEDURE — 85025 COMPLETE CBC W/AUTO DIFF WBC: CPT

## 2021-12-07 PROCEDURE — 82728 ASSAY OF FERRITIN: CPT

## 2021-12-07 PROCEDURE — 36415 COLL VENOUS BLD VENIPUNCTURE: CPT

## 2021-12-07 PROCEDURE — 82607 VITAMIN B-12: CPT

## 2021-12-08 ENCOUNTER — TELEPHONE (OUTPATIENT)
Dept: ONCOLOGY | Age: 71
End: 2021-12-08

## 2021-12-08 DIAGNOSIS — D50.0 IRON DEFICIENCY ANEMIA SECONDARY TO BLOOD LOSS (CHRONIC): Primary | ICD-10-CM

## 2021-12-09 ENCOUNTER — HOSPITAL ENCOUNTER (OUTPATIENT)
Dept: INFUSION THERAPY | Age: 71
Discharge: HOME | End: 2021-12-09
Payer: MEDICARE

## 2021-12-09 ENCOUNTER — OFFICE VISIT (OUTPATIENT)
Dept: ONCOLOGY | Age: 71
End: 2021-12-09
Payer: MEDICARE

## 2021-12-09 VITALS
HEART RATE: 80 BPM | BODY MASS INDEX: 34.45 KG/M2 | OXYGEN SATURATION: 94 % | HEIGHT: 65 IN | DIASTOLIC BLOOD PRESSURE: 74 MMHG | WEIGHT: 206.8 LBS | SYSTOLIC BLOOD PRESSURE: 171 MMHG | TEMPERATURE: 96.9 F

## 2021-12-09 DIAGNOSIS — D50.0 IRON DEFICIENCY ANEMIA SECONDARY TO BLOOD LOSS (CHRONIC): Primary | ICD-10-CM

## 2021-12-09 PROCEDURE — G8484 FLU IMMUNIZE NO ADMIN: HCPCS | Performed by: INTERNAL MEDICINE

## 2021-12-09 PROCEDURE — 99211 OFF/OP EST MAY X REQ PHY/QHP: CPT

## 2021-12-09 PROCEDURE — 1090F PRES/ABSN URINE INCON ASSESS: CPT | Performed by: INTERNAL MEDICINE

## 2021-12-09 PROCEDURE — 1036F TOBACCO NON-USER: CPT | Performed by: INTERNAL MEDICINE

## 2021-12-09 PROCEDURE — G8417 CALC BMI ABV UP PARAM F/U: HCPCS | Performed by: INTERNAL MEDICINE

## 2021-12-09 PROCEDURE — G8400 PT W/DXA NO RESULTS DOC: HCPCS | Performed by: INTERNAL MEDICINE

## 2021-12-09 PROCEDURE — G8427 DOCREV CUR MEDS BY ELIG CLIN: HCPCS | Performed by: INTERNAL MEDICINE

## 2021-12-09 PROCEDURE — 99214 OFFICE O/P EST MOD 30 MIN: CPT | Performed by: INTERNAL MEDICINE

## 2021-12-09 PROCEDURE — 1123F ACP DISCUSS/DSCN MKR DOCD: CPT | Performed by: INTERNAL MEDICINE

## 2021-12-09 PROCEDURE — 4040F PNEUMOC VAC/ADMIN/RCVD: CPT | Performed by: INTERNAL MEDICINE

## 2021-12-09 PROCEDURE — 3017F COLORECTAL CA SCREEN DOC REV: CPT | Performed by: INTERNAL MEDICINE

## 2021-12-09 NOTE — PROGRESS NOTES
MA Rooming Questions  Patient: Julissa Cavazos  MRN: [id number]    Date: 12/9/2021        1. Do you have any new issues? yes - iron infusion ? Having mobility issues using cane at home. 2. Do you need any refills on medications?    no    3. Have you had any imaging done since your last visit? yes - 12/07    4. Have you been hospitalized or seen in the emergency room since your last visit here?   no    5. Did the patient have a depression screening completed today?  Yes    No data recorded     PHQ-9 Given to (if applicable):               PHQ-9 Score (if applicable):                     [] Positive     []  Negative              Does question #9 need addressed (if applicable)                     [] Yes    []  No               Giovani Garza CMA

## 2021-12-23 ENCOUNTER — HOSPITAL ENCOUNTER (OUTPATIENT)
Dept: INFUSION THERAPY | Age: 71
Discharge: HOME | End: 2021-12-23
Payer: MEDICARE

## 2021-12-23 VITALS
SYSTOLIC BLOOD PRESSURE: 137 MMHG | RESPIRATION RATE: 16 BRPM | WEIGHT: 206.8 LBS | HEIGHT: 65 IN | BODY MASS INDEX: 34.45 KG/M2 | DIASTOLIC BLOOD PRESSURE: 64 MMHG | TEMPERATURE: 97.2 F | HEART RATE: 79 BPM | OXYGEN SATURATION: 97 %

## 2021-12-23 DIAGNOSIS — K90.9 INTESTINAL MALABSORPTION, UNSPECIFIED TYPE: Primary | ICD-10-CM

## 2021-12-23 DIAGNOSIS — D50.0 IRON DEFICIENCY ANEMIA SECONDARY TO BLOOD LOSS (CHRONIC): ICD-10-CM

## 2021-12-23 PROCEDURE — 2580000003 HC RX 258: Performed by: INTERNAL MEDICINE

## 2021-12-23 PROCEDURE — 96365 THER/PROPH/DIAG IV INF INIT: CPT

## 2021-12-23 PROCEDURE — 6360000002 HC RX W HCPCS: Performed by: INTERNAL MEDICINE

## 2021-12-23 PROCEDURE — 96366 THER/PROPH/DIAG IV INF ADDON: CPT

## 2021-12-23 PROCEDURE — 6370000000 HC RX 637 (ALT 250 FOR IP)

## 2021-12-23 RX ORDER — HEPARIN SODIUM (PORCINE) LOCK FLUSH IV SOLN 100 UNIT/ML 100 UNIT/ML
500 SOLUTION INTRAVENOUS PRN
Status: CANCELLED | OUTPATIENT
Start: 2021-12-30

## 2021-12-23 RX ORDER — ACETAMINOPHEN 325 MG/1
650 TABLET ORAL
Status: CANCELLED | OUTPATIENT
Start: 2021-12-30

## 2021-12-23 RX ORDER — ONDANSETRON 2 MG/ML
8 INJECTION INTRAMUSCULAR; INTRAVENOUS
Status: CANCELLED | OUTPATIENT
Start: 2021-12-30

## 2021-12-23 RX ORDER — EPINEPHRINE 1 MG/ML
0.3 INJECTION, SOLUTION, CONCENTRATE INTRAVENOUS PRN
Status: CANCELLED | OUTPATIENT
Start: 2021-12-30

## 2021-12-23 RX ORDER — SODIUM CHLORIDE 9 MG/ML
INJECTION, SOLUTION INTRAVENOUS ONCE
Status: COMPLETED | OUTPATIENT
Start: 2021-12-23 | End: 2021-12-23

## 2021-12-23 RX ORDER — ACETAMINOPHEN 325 MG/1
TABLET ORAL
Status: COMPLETED
Start: 2021-12-23 | End: 2021-12-23

## 2021-12-23 RX ORDER — SODIUM CHLORIDE 0.9 % (FLUSH) 0.9 %
5-40 SYRINGE (ML) INJECTION PRN
Status: DISCONTINUED | OUTPATIENT
Start: 2021-12-23 | End: 2021-12-24 | Stop reason: HOSPADM

## 2021-12-23 RX ORDER — SODIUM CHLORIDE 9 MG/ML
25 INJECTION, SOLUTION INTRAVENOUS PRN
Status: CANCELLED | OUTPATIENT
Start: 2021-12-30

## 2021-12-23 RX ORDER — SODIUM CHLORIDE 0.9 % (FLUSH) 0.9 %
5-40 SYRINGE (ML) INJECTION PRN
Status: CANCELLED | OUTPATIENT
Start: 2021-12-30

## 2021-12-23 RX ORDER — DIPHENHYDRAMINE HYDROCHLORIDE 50 MG/ML
50 INJECTION INTRAMUSCULAR; INTRAVENOUS
Status: CANCELLED | OUTPATIENT
Start: 2021-12-30

## 2021-12-23 RX ORDER — SODIUM CHLORIDE 9 MG/ML
INJECTION, SOLUTION INTRAVENOUS CONTINUOUS
Status: CANCELLED | OUTPATIENT
Start: 2021-12-30

## 2021-12-23 RX ORDER — ALBUTEROL SULFATE 90 UG/1
4 AEROSOL, METERED RESPIRATORY (INHALATION) PRN
Status: CANCELLED | OUTPATIENT
Start: 2021-12-30

## 2021-12-23 RX ORDER — MEPERIDINE HYDROCHLORIDE 25 MG/ML
12.5 INJECTION INTRAMUSCULAR; INTRAVENOUS; SUBCUTANEOUS PRN
Status: CANCELLED | OUTPATIENT
Start: 2021-12-30

## 2021-12-23 RX ADMIN — SODIUM CHLORIDE: 9 INJECTION, SOLUTION INTRAVENOUS at 08:49

## 2021-12-23 RX ADMIN — DEXAMETHASONE SODIUM PHOSPHATE 10 MG: 10 INJECTION, SOLUTION INTRAMUSCULAR; INTRAVENOUS at 08:49

## 2021-12-23 RX ADMIN — SODIUM CHLORIDE, PRESERVATIVE FREE 10 ML: 5 INJECTION INTRAVENOUS at 13:04

## 2021-12-23 RX ADMIN — ACETAMINOPHEN 650 MG: 325 TABLET ORAL at 09:32

## 2021-12-23 RX ADMIN — SODIUM CHLORIDE 1000 MG: 9 INJECTION, SOLUTION INTRAVENOUS at 09:33

## 2021-12-23 ASSESSMENT — PAIN SCALES - GENERAL: PAINLEVEL_OUTOF10: 2

## 2021-12-23 NOTE — PROGRESS NOTES
Patient here for iron infusion. Feeling okay. States she has had Infed previously and tolerated without any problems. Pt did have c/o headache and was asking for tylenol. Ryne Harrell NP agreed that pt could have PO Tylenol 650 mg. Infed infusion given as ordered. Denies any problems. AVS given to patient.

## 2022-02-16 NOTE — PROGRESS NOTES
Being addressed in e advice encounter.   Patient Name: Chandler Delgadillo  Patient : 1950  Patient MRN: [id number]     Primary Oncologist: Rui Blair MD  Referring Provider: Jaqui Brewer MD     Date of Service: 3/18/2022      Chief Complaint:   Chief Complaint   Patient presents with    Follow-up     She came in for follow-up visit. Problem List:      Iron deficiency anemia secondary to blood loss (chronic)     Vitamin B12 deficiency anemia     Personal history of other diseases of the digestive system     HPI:   Oumou Cueto is a pleasant 33-year-old  female patient with an underlying history of anemia of iron deficiency, who was referred for consideration of iron infusion. She received parenteral iron in  without any problems. She was seen by Dr. Kwasi Dueñas previously and had colonoscopy and upper endoscopic study more than five years ago. She denied any melena or hematochezia. She received blood transfusion in 2017. At that time, hemoglobin was 7. Stool occult was several years back. In 2017 CMP was within normal limits. White cell count was 6.2, hemoglobin 7.2, hematocrit 25, MCV 59.2, platelet 949. Ferritin was 6, B12 266, TSH 2.28. She tried iron pills, which caused her to have irritation of the stomach and constipation. Blood test in 2017 revealed iron 21. BMP was within normal limits. White cell count was 5.4, hemoglobin 9.2, hematocrit 29.7, MCV 71.1, platelet 365, TSH 2.210. Ferritin was 9 and B12 was 258. She had left cataract surgery on 2017. She had right eye cataract surgeryIn 2018 and hematoma below the right eye. She received iron infusion in 2018 with mild improvement of hemoglobin and ferritin. Iron saturation remained low. She is on omeprazole for acid reflux. Blood tests in 2018 revealed hemoglobin 9.6 MCV 81.5 ferritin 38. B12 was borderline therefore I started B12 injection SQ monthly. She could not tolerate iron by mouth.    In , 2018 anemia was corrected. She had upper, lower endoscopic study and capsule endoscopic study in first quarter of 2018. She was prescribed Iron pill causing stomach discomfort. I advise to take QOD. She was found to have hiatal hernia and small gastric ulcer. She is on omeprazole. Labs in May 2019 showed ferritin at 11, TSH 0.377, white cell 4.0, hemoglobin 10.7, MCV 71.6, platelet 114. CMP was unremarkable. She received blood transfusion in January 2019. She stopped B12 injection in April or March 2019. She takes iron pill every 2 days due to constipation. Reportedly mammogram in December 2018 was benign. She is on antidepressant. She had Infed in August 2019. Ferritin improved to ~30 and Hg ~13.6. She takes prenatal vitamin with Iron every other day. Labs in December 2019 were reviewed. Hg and Ferritin improved. She started B-12 injection in December 2019. Reports she has resumed B12. She received INFeD in November 2020. She was agreeable to resume the B12 shot. She has been followed by Dr. Rishabh Pandya for the chronic knee pain. She received cortical cortisone shot and gel shot. On March 4, 2021 she had B-12 injection. Reportedly colonoscopy is not due until 2025. She is agreeable to try B12 sublingual.  Labs in May 2021 showed improvement of the hemoglobin at 11.9, ferritin at 54, B12 395.9. She had iron infusion 7/21. She reports energy has somewhat improved. 9/2 ferritin 132, iron 60, TIBC 245, Transferrin % 24. Labs on 12/7/2021 showed recurrent PRANAV. B-12 was 365.9 and ferritin 18. I strongly recommend to follow up with GI. She has seen Dr Tonya Reeves before. She takes meloxicam PRN. On March 18, 2022 she came in for follow-up visit. She had Iron infusion, INFeD 1000 mg on 12/23/2021. She started feeling tired easily again. Denied any weight loss or night sweats. Ferritin in March 2022 was 19, iron was 36, transferrin saturation 11%. She takes meloxicam as needed.   I strongly recommend she follow-up with GI since I am concerned that she has GI bleeds. She is agreeable to iron infusion, INFeD within 1 or 2 weeks. I will repeat anemic work-up including iron study prior to next office visit. No acute pain. Denied any nausea, vomiting or diarrhea. No fever or chills. No chest pain, shortness of breath. No headache or dizzy spell. No specific bone pain. No melena or hematochezia. Denied any dysuria or hematuria. Past Medical History  Allergic rhinitis, anemia of iron deficiency, diverticulosis of the colon, status post colonoscopy in December 2011, fibromyalgia, GERD, dyslipidemia, hypothyroidism, vitamin D deficiency. Surgical History   Breast biopsy, cholecystectomy in 2006 by Dr. Josy Catalan. Review of Systems: \"Per interval history; otherwise 10 point ROS is negative. \"     Vital Signs:  BP (!) 149/75 (Site: Right Upper Arm, Position: Sitting, Cuff Size: Large Adult)   Pulse 86   Temp 95.8 °F (35.4 °C) (Infrared)   Resp 16   Ht 5' 4.5\" (1.638 m)   Wt 207 lb (93.9 kg)   SpO2 95%   BMI 34.98 kg/m²     Physical Exam:    CONSTITUTIONAL: awake, alert, cooperative, no apparent distress   EYES:sclera clear and pale conjunctiva. ENT: Normocephalic, without obvious abnormality, atraumatic  NECK: supple, symmetrical.  No JVD. No goiter. HEMATOLOGIC/LYMPHATIC: no cervical, supraclavicular or axillary lymphadenopathy   LUNGS: no increased work of breathing and clear to auscultation   CARDIOVASCULAR: regular rate and rhythm, normal S1 and S2, no murmur  ABDOMEN: normal bowel sound, soft, non-distended, non-tender, no masses palpated, no hepatosplenomegaly   MUSCULOSKELETAL: full range of motion noted, tone is normal  NEUROLOGIC: Motor is grossly intact. CN II -XII are  grossly intact. SKIN: Normal skin color, texture, turgor and no jaundice. appears intact   EXTREMITIES: no LE edema or cyanosis.      Labs:  Hematology:  Lab Results   Component Value Date    WBC 5.6 03/11/2022    RBC 4.75 03/11/2022    HGB 12.7 03/11/2022    HCT 40.4 03/11/2022    MCV 85.1 03/11/2022    MCH 26.7 (L) 03/11/2022    MCHC 31.4 (L) 03/11/2022    RDW 16.7 (H) 03/11/2022     03/11/2022    MPV 11.5 (H) 03/11/2022    SEGSPCT 68.3 (H) 03/11/2022    EOSRELPCT 3.6 (H) 03/11/2022    BASOPCT 0.5 03/11/2022    LYMPHOPCT 20.5 (L) 03/11/2022    MONOPCT 7.1 (H) 03/11/2022    SEGSABS 3.8 03/11/2022    EOSABS 0.2 03/11/2022    BASOSABS 0.0 03/11/2022    LYMPHSABS 1.2 03/11/2022    MONOSABS 0.4 03/11/2022    DIFFTYPE AUTOMATED DIFFERENTIAL 03/11/2022     Chemistry:  Lab Results   Component Value Date     12/02/2021    K 4.2 12/02/2021     12/02/2021    CO2 24 12/02/2021    BUN 23 12/02/2021    CREATININE 0.9 12/02/2021    GLUCOSE 105 (H) 12/02/2021    CALCIUM 9.0 12/02/2021    PROT 6.9 12/02/2021    LABALBU 4.7 12/02/2021    BILITOT 0.1 12/02/2021    ALKPHOS 100 12/02/2021    AST 15 12/02/2021    ALT 9 (L) 12/02/2021    LABGLOM >60 12/02/2021    GFRAA >60 12/02/2021     Immunology:  Lab Results   Component Value Date    PROT 6.9 12/02/2021     Anemia Panel:  Lab Results   Component Value Date    [id number] 365.9 12/07/2021    FOLATE 4.5 12/07/2021        Observations:  Performance Status: 1  Symptoms, but ambulatory. Restricted in physically strenuous activity, but ambulatory and able to carry out work of a light or sedentary nature   Depression Status:   Cognitive Status: Oriented to person, time, and place     Assessment & Plan:  1. She had upper and lower endoscopic study by Dr. Dixie Jay. She received iron infusion more than five years ago. She received INFeD in January 2018 with mild improvement of hemoglobin and ferritin. Upper and lower endoscopic study and capsule endoscopic study in March 2018 were performed by Dr Dixie Jay. Blood test in May 2019 was suggestive of low iron. She had Infed in August 2019. Ferritin is borderline low, Hg is improving. She had Infed in November 2019.  Labs in December 2019 were reviewed. CBC and Ferritin improved. In June 2020 hemoglobin, B12 and iron dropped. She did not come for the B12 injection for a while. She resumed B12 shot December 2020. She received iron infusion in November 2020. She had B-12 injection in March 2021. She received INFeD in March 2021. Labs in May 2021 showed improvement of hemoglobin, iron and B12. Labs in July 2021 showed recurrent PRANAV. She had Iron infusion in July 2021. Labs in December 2021 showed recurrent PRANAV. Freddy Coughlin She received iron infusion in December 2021. Labs in March 2022 showed low ferritin. She is having increasing fatigue again. She is agreeable to INFeD infusion within 1 or 2 weeks. I recommend to follow up with GI. She is on meloxicam PRN. I will recheck Iron study and CBC prior to next OV. 2. Due to low B-12 level, she started on monthly SQ B-12 injection. She stopped B-12 injection in March or April 2019. B-12 in December 2019 was low. She resumed B-12 injection in December 2019. She resumed B12 injection in December 2020. I gave her the prescription of sublingual B12. She responds to oral B-12 SL.  B-12 on 12/7/2021 was 365.9    3. I advised her to keep her other age-appropriate cancer screening up to date. Mammogram in December 2018 was benign. I remind her about mammogram.    4. I advise to exercise. She could have chronic fatigue. She had Covid vaccine. RTC in 8 weeks sooner. All of her questions have been answered for today. Recent imaging and labs were reviewed and discussed with the patient.

## 2022-03-11 ENCOUNTER — HOSPITAL ENCOUNTER (OUTPATIENT)
Dept: INFUSION THERAPY | Age: 72
Discharge: HOME | End: 2022-03-11
Payer: MEDICARE

## 2022-03-11 DIAGNOSIS — D50.0 IRON DEFICIENCY ANEMIA SECONDARY TO BLOOD LOSS (CHRONIC): ICD-10-CM

## 2022-03-11 LAB
BASOPHILS ABSOLUTE: 0 K/CU MM
BASOPHILS RELATIVE PERCENT: 0.5 % (ref 0–1)
DIFFERENTIAL TYPE: ABNORMAL
EOSINOPHILS ABSOLUTE: 0.2 K/CU MM
EOSINOPHILS RELATIVE PERCENT: 3.6 % (ref 0–3)
FERRITIN: 19 NG/ML (ref 15–150)
HCT VFR BLD CALC: 40.4 % (ref 37–47)
HEMOGLOBIN: 12.7 GM/DL (ref 12.5–16)
IRON: 36 UG/DL (ref 37–145)
LYMPHOCYTES ABSOLUTE: 1.2 K/CU MM
LYMPHOCYTES RELATIVE PERCENT: 20.5 % (ref 24–44)
MCH RBC QN AUTO: 26.7 PG (ref 27–31)
MCHC RBC AUTO-ENTMCNC: 31.4 % (ref 32–36)
MCV RBC AUTO: 85.1 FL (ref 78–100)
MONOCYTES ABSOLUTE: 0.4 K/CU MM
MONOCYTES RELATIVE PERCENT: 7.1 % (ref 0–4)
PCT TRANSFERRIN: 11 % (ref 10–44)
PDW BLD-RTO: 16.7 % (ref 11.7–14.9)
PLATELET # BLD: 199 K/CU MM (ref 140–440)
PMV BLD AUTO: 11.5 FL (ref 7.5–11.1)
RBC # BLD: 4.75 M/CU MM (ref 4.2–5.4)
SEGMENTED NEUTROPHILS ABSOLUTE COUNT: 3.8 K/CU MM
SEGMENTED NEUTROPHILS RELATIVE PERCENT: 68.3 % (ref 36–66)
TOTAL IRON BINDING CAPACITY: 317 UG/DL (ref 250–450)
UNSATURATED IRON BINDING CAPACITY: 281 UG/DL (ref 110–370)
WBC # BLD: 5.6 K/CU MM (ref 4–10.5)

## 2022-03-11 PROCEDURE — 36415 COLL VENOUS BLD VENIPUNCTURE: CPT

## 2022-03-11 PROCEDURE — 83540 ASSAY OF IRON: CPT

## 2022-03-11 PROCEDURE — 85025 COMPLETE CBC W/AUTO DIFF WBC: CPT

## 2022-03-11 PROCEDURE — 82728 ASSAY OF FERRITIN: CPT

## 2022-03-11 PROCEDURE — 83550 IRON BINDING TEST: CPT

## 2022-03-18 ENCOUNTER — HOSPITAL ENCOUNTER (OUTPATIENT)
Dept: INFUSION THERAPY | Age: 72
Discharge: HOME | End: 2022-03-18

## 2022-03-18 ENCOUNTER — OFFICE VISIT (OUTPATIENT)
Dept: ONCOLOGY | Age: 72
End: 2022-03-18
Payer: MEDICARE

## 2022-03-18 VITALS
TEMPERATURE: 95.8 F | HEART RATE: 86 BPM | DIASTOLIC BLOOD PRESSURE: 75 MMHG | SYSTOLIC BLOOD PRESSURE: 149 MMHG | HEIGHT: 65 IN | OXYGEN SATURATION: 95 % | BODY MASS INDEX: 34.49 KG/M2 | RESPIRATION RATE: 16 BRPM | WEIGHT: 207 LBS

## 2022-03-18 DIAGNOSIS — D50.0 IRON DEFICIENCY ANEMIA SECONDARY TO BLOOD LOSS (CHRONIC): Primary | ICD-10-CM

## 2022-03-18 PROCEDURE — G8484 FLU IMMUNIZE NO ADMIN: HCPCS | Performed by: INTERNAL MEDICINE

## 2022-03-18 PROCEDURE — 1036F TOBACCO NON-USER: CPT | Performed by: INTERNAL MEDICINE

## 2022-03-18 PROCEDURE — 1090F PRES/ABSN URINE INCON ASSESS: CPT | Performed by: INTERNAL MEDICINE

## 2022-03-18 PROCEDURE — 3017F COLORECTAL CA SCREEN DOC REV: CPT | Performed by: INTERNAL MEDICINE

## 2022-03-18 PROCEDURE — 1123F ACP DISCUSS/DSCN MKR DOCD: CPT | Performed by: INTERNAL MEDICINE

## 2022-03-18 PROCEDURE — 4040F PNEUMOC VAC/ADMIN/RCVD: CPT | Performed by: INTERNAL MEDICINE

## 2022-03-18 PROCEDURE — G8417 CALC BMI ABV UP PARAM F/U: HCPCS | Performed by: INTERNAL MEDICINE

## 2022-03-18 PROCEDURE — G8400 PT W/DXA NO RESULTS DOC: HCPCS | Performed by: INTERNAL MEDICINE

## 2022-03-18 PROCEDURE — 99213 OFFICE O/P EST LOW 20 MIN: CPT | Performed by: INTERNAL MEDICINE

## 2022-03-18 PROCEDURE — G8427 DOCREV CUR MEDS BY ELIG CLIN: HCPCS | Performed by: INTERNAL MEDICINE

## 2022-03-18 ASSESSMENT — PATIENT HEALTH QUESTIONNAIRE - PHQ9
SUM OF ALL RESPONSES TO PHQ9 QUESTIONS 1 & 2: 2
2. FEELING DOWN, DEPRESSED OR HOPELESS: 2
SUM OF ALL RESPONSES TO PHQ QUESTIONS 1-9: 2
SUM OF ALL RESPONSES TO PHQ QUESTIONS 1-9: 2
1. LITTLE INTEREST OR PLEASURE IN DOING THINGS: 0
SUM OF ALL RESPONSES TO PHQ QUESTIONS 1-9: 2
SUM OF ALL RESPONSES TO PHQ QUESTIONS 1-9: 2

## 2022-03-18 NOTE — PROGRESS NOTES
MA Rooming Questions  Patient: Tejinder Culver  MRN: [id number]    Date: 3/18/2022        1. Do you have any new issues? yes - Pt states she feels weak         2. Do you need any refills on medications?    no    3. Have you had any imaging done since your last visit?   no    4. Have you been hospitalized or seen in the emergency room since your last visit here?   no    5. Did the patient have a depression screening completed today?  Yes    No data recorded     PHQ-9 Given to (if applicable):               PHQ-9 Score (if applicable):                     [] Positive     [x]  Negative              Does question #9 need addressed (if applicable)                     [] Yes    []  No               Tae Ibarra MA

## 2022-03-22 RX ORDER — SODIUM CHLORIDE 9 MG/ML
INJECTION, SOLUTION INTRAVENOUS CONTINUOUS
Status: CANCELLED | OUTPATIENT
Start: 2022-03-22

## 2022-03-22 RX ORDER — ACETAMINOPHEN 325 MG/1
650 TABLET ORAL
Status: CANCELLED | OUTPATIENT
Start: 2022-03-22

## 2022-03-22 RX ORDER — SODIUM CHLORIDE 0.9 % (FLUSH) 0.9 %
5-40 SYRINGE (ML) INJECTION PRN
Status: CANCELLED | OUTPATIENT
Start: 2022-03-22

## 2022-03-22 RX ORDER — MEPERIDINE HYDROCHLORIDE 50 MG/ML
12.5 INJECTION INTRAMUSCULAR; INTRAVENOUS; SUBCUTANEOUS PRN
Status: CANCELLED | OUTPATIENT
Start: 2022-03-22

## 2022-03-22 RX ORDER — HEPARIN SODIUM (PORCINE) LOCK FLUSH IV SOLN 100 UNIT/ML 100 UNIT/ML
500 SOLUTION INTRAVENOUS PRN
Status: CANCELLED | OUTPATIENT
Start: 2022-03-22

## 2022-03-22 RX ORDER — DIPHENHYDRAMINE HYDROCHLORIDE 50 MG/ML
50 INJECTION INTRAMUSCULAR; INTRAVENOUS
Status: CANCELLED | OUTPATIENT
Start: 2022-03-22

## 2022-03-22 RX ORDER — ACETAMINOPHEN 325 MG/1
650 TABLET ORAL ONCE
Status: CANCELLED
Start: 2022-03-22 | End: 2022-03-22

## 2022-03-22 RX ORDER — ALBUTEROL SULFATE 90 UG/1
4 AEROSOL, METERED RESPIRATORY (INHALATION) PRN
Status: CANCELLED | OUTPATIENT
Start: 2022-03-22

## 2022-03-22 RX ORDER — EPINEPHRINE 1 MG/ML
0.3 INJECTION, SOLUTION, CONCENTRATE INTRAVENOUS PRN
Status: CANCELLED | OUTPATIENT
Start: 2022-03-22

## 2022-03-22 RX ORDER — SODIUM CHLORIDE 9 MG/ML
25 INJECTION, SOLUTION INTRAVENOUS PRN
Status: CANCELLED | OUTPATIENT
Start: 2022-03-22

## 2022-03-22 RX ORDER — ONDANSETRON 2 MG/ML
8 INJECTION INTRAMUSCULAR; INTRAVENOUS
Status: CANCELLED | OUTPATIENT
Start: 2022-03-22

## 2022-03-22 RX ORDER — DEXAMETHASONE SODIUM PHOSPHATE 10 MG/ML
10 INJECTION INTRAMUSCULAR; INTRAVENOUS ONCE
Status: CANCELLED
Start: 2022-03-22 | End: 2022-03-22

## 2022-03-22 NOTE — PROGRESS NOTES
Patient needs iron infusion for iron deficiency anemia d/t chronic blood loss. Order for infed 1000 mg and therapy plan placed. Called patient at [phone] to notify. Explained that once infusion scheduled, patient will be informed of appointment time. Patient voices understanding. Denies questions or concerns at this time.

## 2022-03-29 ENCOUNTER — TELEPHONE (OUTPATIENT)
Dept: ONCOLOGY | Age: 72
End: 2022-03-29

## 2022-03-29 NOTE — TELEPHONE ENCOUNTER
Called patient to schedule iron infusion, patient is scheduled for Tues. 04/05 @ 0930, advised patient to notify us asap if unable to keep their appt time and if appt is No Showed or No Call, rescheduled appt may take up to 3 months to schedule, also no visitors at time of treatment, patient states understanding

## 2022-04-05 ENCOUNTER — HOSPITAL ENCOUNTER (OUTPATIENT)
Dept: INFUSION THERAPY | Age: 72
Discharge: HOME | End: 2022-04-05
Payer: MEDICARE

## 2022-04-05 VITALS
SYSTOLIC BLOOD PRESSURE: 167 MMHG | RESPIRATION RATE: 16 BRPM | HEART RATE: 80 BPM | HEIGHT: 65 IN | TEMPERATURE: 96.9 F | BODY MASS INDEX: 34.49 KG/M2 | WEIGHT: 207 LBS | DIASTOLIC BLOOD PRESSURE: 74 MMHG | OXYGEN SATURATION: 98 %

## 2022-04-05 DIAGNOSIS — D50.0 IRON DEFICIENCY ANEMIA SECONDARY TO BLOOD LOSS (CHRONIC): ICD-10-CM

## 2022-04-05 DIAGNOSIS — K90.9 INTESTINAL MALABSORPTION, UNSPECIFIED TYPE: Primary | ICD-10-CM

## 2022-04-05 PROCEDURE — 96365 THER/PROPH/DIAG IV INF INIT: CPT

## 2022-04-05 PROCEDURE — 6370000000 HC RX 637 (ALT 250 FOR IP): Performed by: INTERNAL MEDICINE

## 2022-04-05 PROCEDURE — 6360000002 HC RX W HCPCS: Performed by: INTERNAL MEDICINE

## 2022-04-05 PROCEDURE — 96366 THER/PROPH/DIAG IV INF ADDON: CPT

## 2022-04-05 PROCEDURE — 2580000003 HC RX 258: Performed by: INTERNAL MEDICINE

## 2022-04-05 PROCEDURE — 96375 TX/PRO/DX INJ NEW DRUG ADDON: CPT

## 2022-04-05 RX ORDER — SODIUM CHLORIDE 9 MG/ML
25 INJECTION, SOLUTION INTRAVENOUS PRN
Status: CANCELLED | OUTPATIENT
Start: 2022-04-12

## 2022-04-05 RX ORDER — SODIUM CHLORIDE 9 MG/ML
INJECTION, SOLUTION INTRAVENOUS CONTINUOUS
Status: CANCELLED | OUTPATIENT
Start: 2022-04-12

## 2022-04-05 RX ORDER — MEPERIDINE HYDROCHLORIDE 25 MG/ML
12.5 INJECTION INTRAMUSCULAR; INTRAVENOUS; SUBCUTANEOUS PRN
Status: CANCELLED | OUTPATIENT
Start: 2022-04-12

## 2022-04-05 RX ORDER — ALBUTEROL SULFATE 90 UG/1
4 AEROSOL, METERED RESPIRATORY (INHALATION) PRN
Status: CANCELLED | OUTPATIENT
Start: 2022-04-12

## 2022-04-05 RX ORDER — EPINEPHRINE 1 MG/ML
0.3 INJECTION, SOLUTION, CONCENTRATE INTRAVENOUS PRN
Status: CANCELLED | OUTPATIENT
Start: 2022-04-12

## 2022-04-05 RX ORDER — HEPARIN SODIUM (PORCINE) LOCK FLUSH IV SOLN 100 UNIT/ML 100 UNIT/ML
500 SOLUTION INTRAVENOUS PRN
Status: CANCELLED | OUTPATIENT
Start: 2022-04-12

## 2022-04-05 RX ORDER — ACETAMINOPHEN 325 MG/1
650 TABLET ORAL ONCE
Status: COMPLETED | OUTPATIENT
Start: 2022-04-05 | End: 2022-04-05

## 2022-04-05 RX ORDER — SODIUM CHLORIDE 0.9 % (FLUSH) 0.9 %
5-40 SYRINGE (ML) INJECTION PRN
Status: CANCELLED | OUTPATIENT
Start: 2022-04-12

## 2022-04-05 RX ORDER — ONDANSETRON 2 MG/ML
8 INJECTION INTRAMUSCULAR; INTRAVENOUS
Status: CANCELLED | OUTPATIENT
Start: 2022-04-12

## 2022-04-05 RX ORDER — ACETAMINOPHEN 325 MG/1
650 TABLET ORAL
Status: CANCELLED | OUTPATIENT
Start: 2022-04-12

## 2022-04-05 RX ORDER — ACETAMINOPHEN 325 MG/1
650 TABLET ORAL ONCE
Status: CANCELLED
Start: 2022-04-12 | End: 2022-04-12

## 2022-04-05 RX ORDER — SODIUM CHLORIDE 9 MG/ML
INJECTION, SOLUTION INTRAVENOUS ONCE
Status: COMPLETED | OUTPATIENT
Start: 2022-04-05 | End: 2022-04-05

## 2022-04-05 RX ORDER — DIPHENHYDRAMINE HYDROCHLORIDE 50 MG/ML
50 INJECTION INTRAMUSCULAR; INTRAVENOUS
Status: CANCELLED | OUTPATIENT
Start: 2022-04-12

## 2022-04-05 RX ADMIN — SODIUM CHLORIDE: 9 INJECTION, SOLUTION INTRAVENOUS at 10:15

## 2022-04-05 RX ADMIN — ACETAMINOPHEN 650 MG: 325 TABLET ORAL at 10:13

## 2022-04-05 RX ADMIN — SODIUM CHLORIDE 1000 MG: 9 INJECTION, SOLUTION INTRAVENOUS at 10:48

## 2022-04-05 RX ADMIN — DEXAMETHASONE SODIUM PHOSPHATE 10 MG: 10 INJECTION INTRAMUSCULAR; INTRAVENOUS at 10:16

## 2022-04-05 ASSESSMENT — PAIN SCALES - GENERAL: PAINLEVEL_OUTOF10: 0

## 2022-04-05 NOTE — PROGRESS NOTES
Ambulated to infusion area. Here for iron infusion. Admits to feeling tired. Infed administered as ordered. Call light within reach. Tolerated infusion without incident. AVS provided. Discharged in stable condition.

## 2022-04-20 NOTE — PROGRESS NOTES
Patient Name: Sary Carter  Patient : 1950  Patient MRN: [id number]     Primary Oncologist: Kinjal Martinez MD  Referring Provider: Darlyn Roy MD     Date of Service: 2022      Chief Complaint:   Chief Complaint   Patient presents with    Follow-up     She came in for follow-up visit. Problem List:      Iron deficiency anemia secondary to blood loss (chronic)     Vitamin B12 deficiency anemia     Personal history of other diseases of the digestive system     HPI:   Fausto Santiago is a pleasant 62-year-old  female patient with an underlying history of anemia of iron deficiency, who was referred for consideration of iron infusion. She received parenteral iron in  without any problems. She was seen by Dr. Henny Michel previously and had colonoscopy and upper endoscopic study more than five years ago. She denied any melena or hematochezia. She received blood transfusion in 2017. At that time, hemoglobin was 7. Stool occult was several years back. In 2017 CMP was within normal limits. White cell count was 6.2, hemoglobin 7.2, hematocrit 25, MCV 59.2, platelet 946. Ferritin was 6, B12 266, TSH 2.28. She tried iron pills, which caused her to have irritation of the stomach and constipation. Blood test in 2017 revealed iron 21. BMP was within normal limits. White cell count was 5.4, hemoglobin 9.2, hematocrit 29.7, MCV 71.1, platelet 423, TSH 5.057. Ferritin was 9 and B12 was 258. She had left cataract surgery on 2017. She had right eye cataract surgeryIn 2018 and hematoma below the right eye. She received iron infusion in 2018 with mild improvement of hemoglobin and ferritin. Iron saturation remained low. She is on omeprazole for acid reflux. Blood tests in 2018 revealed hemoglobin 9.6 MCV 81.5 ferritin 38. B12 was borderline therefore I started B12 injection SQ monthly. She could not tolerate iron by mouth.    In , 2018 anemia was corrected. She had upper, lower endoscopic study and capsule endoscopic study in first quarter of 2018. She was prescribed Iron pill causing stomach discomfort. I advise to take QOD. She was found to have hiatal hernia and small gastric ulcer. She is on omeprazole. Labs in May 2019 showed ferritin at 11, TSH 0.377, white cell 4.0, hemoglobin 10.7, MCV 71.6, platelet 794. CMP was unremarkable. She received blood transfusion in January 2019. She stopped B12 injection in April or March 2019. She takes iron pill every 2 days due to constipation. Reportedly mammogram in December 2018 was benign. She is on antidepressant. She had Infed in August 2019. Ferritin improved to ~30 and Hg ~13.6. She takes prenatal vitamin with Iron every other day. Labs in December 2019 were reviewed. Hg and Ferritin improved. She started B-12 injection in December 2019. Reports she has resumed B12. She received INFeD in November 2020. She was agreeable to resume the B12 shot. She has been followed by Dr. Maximus Ge for the chronic knee pain. She received cortical cortisone shot and gel shot. On March 4, 2021 she had B-12 injection. Reportedly colonoscopy is not due until 2025. She is agreeable to try B12 sublingual.  Labs in May 2021 showed improvement of the hemoglobin at 11.9, ferritin at 54, B12 395.9. She had iron infusion 7/21. She reports energy has somewhat improved. 9/2 ferritin 132, iron 60, TIBC 245, Transferrin % 24. Labs on 12/7/2021 showed recurrent PRANAV. B-12 was 365.9 and ferritin 18. I strongly recommend to follow up with GI. She has seen Dr Bijal Clay before. She takes meloxicam PRN. She had Iron infusion, INFeD 1000 mg on 12/23/2021. She started feeling tired easily again. Denied any weight loss or night sweats. Ferritin in March 2022 was 19, iron was 36, transferrin saturation 11%. She takes meloxicam as needed.   I strongly recommend she follow-up with GI since I am concerned that she has GI bleeds. On May 20, 2022 she came in for follow up visit. She had Infed on 4/5/2022. She started feeling better. Labs in May 2022 showed normal Hg with Hg of 13.5. EGD in May 2022 was reportedly negative. She had colonoscopy in 2018. She was recommended to take meloxicam with food. I recommend to try turmeric capsule. She is agreeable to have repeat CBC and Iron study prior to next OV. No acute pain. Denied any nausea, vomiting or diarrhea. No fever or chills. No chest pain, shortness of breath. No headache or dizzy spell. No specific bone pain. No melena or hematochezia. Denied any dysuria or hematuria. Past Medical History  Allergic rhinitis, anemia of iron deficiency, diverticulosis of the colon, status post colonoscopy in December 2011, fibromyalgia, GERD, dyslipidemia, hypothyroidism, vitamin D deficiency. Surgical History   Breast biopsy, cholecystectomy in 2006 by Dr. Camelia Sears. Review of Systems: \"Per interval history; otherwise 10 point ROS is negative. \"     Vital Signs:  BP (!) 168/79 (Site: Right Upper Arm, Position: Sitting, Cuff Size: Medium Adult)   Pulse 61   Temp 96 °F (35.6 °C) (Infrared)   Ht 5' 4.5\" (1.638 m)   Wt 202 lb 3.2 oz (91.7 kg)   SpO2 97%   BMI 34.17 kg/m²     Physical Exam:    CONSTITUTIONAL: awake, alert, cooperative, no apparent distress   EYES:sclera clear and normal conjunctiva. ENT: Normocephalic, without obvious abnormality, atraumatic  NECK: supple, symmetrical.  No JVD. No goiter. HEMATOLOGIC/LYMPHATIC: no cervical, supraclavicular or axillary lymphadenopathy   LUNGS: no increased work of breathing and clear to auscultation   CARDIOVASCULAR: regular rate and rhythm, normal S1 and S2, no murmur  ABDOMEN: normal bowel sound, soft, non-distended, non-tender, no masses palpated, no hepatosplenomegaly   MUSCULOSKELETAL: full range of motion noted, tone is normal  NEUROLOGIC: Motor is grossly intact.  CN II -XII are  grossly 7.0 05/13/2022    FOLATE 6.4 05/13/2022        Observations:  Performance Status: 1  Symptoms, but ambulatory. Restricted in physically strenuous activity, but ambulatory and able to carry out work of a light or sedentary nature   Depression Status:   Cognitive Status: Oriented to person, time, and place     Assessment & Plan:  1. She had upper and lower endoscopic study by Dr. Lory Espinoza. She received iron infusion more than five years ago. She received INFeD in January 2018 with mild improvement of hemoglobin and ferritin. Upper and lower endoscopic study and capsule endoscopic study in March 2018 were performed by Dr Lory Espinoza. Blood test in May 2019 was suggestive of low iron. She had Infed in August 2019. Ferritin is borderline low, Hg is improving. She had Infed in November 2019. Labs in December 2019 were reviewed. CBC and Ferritin improved. In June 2020 hemoglobin, B12 and iron dropped. She did not come for the B12 injection for a while. She resumed B12 shot December 2020. She received iron infusion in November 2020. She had B-12 injection in March 2021. She received INFeD in March 2021. Labs in May 2021 showed improvement of hemoglobin, iron and B12. Labs in July 2021 showed recurrent PRANAV. She had Iron infusion in July 2021. Labs in December 2021 showed recurrent PRANAV. Kobi Isbell She received iron infusion in December 2021. Labs in March 2022 showed low ferritin. She had INFeD infusionin April 2022. Labs in May 2022 showed improvement of Hg and Iron study. She had EGD in May 2022 which was negative. I will recheck Iron study and CBC prior to next OV. 2. Due to low B-12 level, she started on monthly SQ B-12 injection. She stopped B-12 injection in March or April 2019. B-12 in December 2019 was low. She resumed B-12 injection in December 2019. She resumed B12 injection in December 2020. I gave her the prescription of sublingual B12. She responded to oral B-12 SL.  B-12 on 12/7/2021 was 365.9    3.  I advised her to keep her other age-appropriate cancer screening up to date. Mammogram in December 2018 was benign. I remind her about mammogram.    4. I advise to exercise. She could have chronic fatigue. She had Covid vaccine. RTC in 6 months sooner. All of her questions have been answered for today. Recent imaging and labs were reviewed and discussed with the patient.

## 2022-05-13 ENCOUNTER — HOSPITAL ENCOUNTER (OUTPATIENT)
Dept: INFUSION THERAPY | Age: 72
Discharge: HOME | End: 2022-05-13
Payer: MEDICARE

## 2022-05-13 ENCOUNTER — HOSPITAL ENCOUNTER (OUTPATIENT)
Age: 72
Setting detail: SPECIMEN
Discharge: HOME | End: 2022-05-13
Payer: MEDICARE

## 2022-05-13 DIAGNOSIS — D50.0 IRON DEFICIENCY ANEMIA SECONDARY TO BLOOD LOSS (CHRONIC): ICD-10-CM

## 2022-05-13 LAB
BASOPHILS ABSOLUTE: 0 K/CU MM
BASOPHILS ABSOLUTE: 0.1 K/CU MM
BASOPHILS RELATIVE PERCENT: 0.6 % (ref 0–1)
BASOPHILS RELATIVE PERCENT: 1.5 % (ref 0–1)
DIFFERENTIAL TYPE: ABNORMAL
DIFFERENTIAL TYPE: ABNORMAL
EOSINOPHILS ABSOLUTE: 0.2 K/CU MM
EOSINOPHILS ABSOLUTE: 0.2 K/CU MM
EOSINOPHILS RELATIVE PERCENT: 3.8 % (ref 0–3)
EOSINOPHILS RELATIVE PERCENT: 4.2 % (ref 0–3)
FERRITIN: 260 NG/ML (ref 15–150)
FERRITIN: 261 NG/ML (ref 15–150)
FOLATE: 6.4 NG/ML (ref 3.1–17.5)
FOLATE: 7 NG/ML (ref 3.1–17.5)
HCT VFR BLD CALC: 42.4 % (ref 37–47)
HCT VFR BLD CALC: 43.4 % (ref 37–47)
HEMOGLOBIN: 13.5 GM/DL (ref 12.5–16)
HEMOGLOBIN: 13.6 GM/DL (ref 12.5–16)
IMMATURE NEUTROPHIL %: 0.4 % (ref 0–0.43)
IRON: 113 UG/DL (ref 37–145)
IRON: 115 UG/DL (ref 37–145)
LYMPHOCYTES ABSOLUTE: 1.1 K/CU MM
LYMPHOCYTES ABSOLUTE: 1.2 K/CU MM
LYMPHOCYTES RELATIVE PERCENT: 23.8 % (ref 24–44)
LYMPHOCYTES RELATIVE PERCENT: 25.3 % (ref 24–44)
MCH RBC QN AUTO: 27.6 PG (ref 27–31)
MCH RBC QN AUTO: 27.7 PG (ref 27–31)
MCHC RBC AUTO-ENTMCNC: 31.1 % (ref 32–36)
MCHC RBC AUTO-ENTMCNC: 32.1 % (ref 32–36)
MCV RBC AUTO: 86 FL (ref 78–100)
MCV RBC AUTO: 89.1 FL (ref 78–100)
MONOCYTES ABSOLUTE: 0.4 K/CU MM
MONOCYTES ABSOLUTE: 0.5 K/CU MM
MONOCYTES RELATIVE PERCENT: 8.2 % (ref 0–4)
MONOCYTES RELATIVE PERCENT: 9.6 % (ref 0–4)
NUCLEATED RBC %: 0 %
PCT TRANSFERRIN: 49 % (ref 10–44)
PCT TRANSFERRIN: 50 % (ref 10–44)
PDW BLD-RTO: 18 % (ref 11.7–14.9)
PDW BLD-RTO: 18.7 % (ref 11.7–14.9)
PLATELET # BLD: 177 K/CU MM (ref 140–440)
PLATELET # BLD: 194 K/CU MM (ref 140–440)
PMV BLD AUTO: 11.4 FL (ref 7.5–11.1)
PMV BLD AUTO: 12.5 FL (ref 7.5–11.1)
RBC # BLD: 4.87 M/CU MM (ref 4.2–5.4)
RBC # BLD: 4.93 M/CU MM (ref 4.2–5.4)
SEGMENTED NEUTROPHILS ABSOLUTE COUNT: 2.8 K/CU MM
SEGMENTED NEUTROPHILS ABSOLUTE COUNT: 3 K/CU MM
SEGMENTED NEUTROPHILS RELATIVE PERCENT: 60.3 % (ref 36–66)
SEGMENTED NEUTROPHILS RELATIVE PERCENT: 62.3 % (ref 36–66)
TOTAL IMMATURE NEUTOROPHIL: 0.02 K/CU MM
TOTAL IRON BINDING CAPACITY: 229 UG/DL (ref 250–450)
TOTAL IRON BINDING CAPACITY: 230 UG/DL (ref 250–450)
TOTAL NUCLEATED RBC: 0 K/CU MM
UNSATURATED IRON BINDING CAPACITY: 115 UG/DL (ref 110–370)
UNSATURATED IRON BINDING CAPACITY: 116 UG/DL (ref 110–370)
VITAMIN B-12: 365.5 PG/ML (ref 211–911)
VITAMIN B-12: 369 PG/ML (ref 211–911)
WBC # BLD: 4.7 K/CU MM (ref 4–10.5)
WBC # BLD: 4.8 K/CU MM (ref 4–10.5)

## 2022-05-13 PROCEDURE — 36415 COLL VENOUS BLD VENIPUNCTURE: CPT

## 2022-05-13 PROCEDURE — 83540 ASSAY OF IRON: CPT

## 2022-05-13 PROCEDURE — 82728 ASSAY OF FERRITIN: CPT

## 2022-05-13 PROCEDURE — 82607 VITAMIN B-12: CPT

## 2022-05-13 PROCEDURE — 82746 ASSAY OF FOLIC ACID SERUM: CPT

## 2022-05-13 PROCEDURE — 83516 IMMUNOASSAY NONANTIBODY: CPT

## 2022-05-13 PROCEDURE — 85025 COMPLETE CBC W/AUTO DIFF WBC: CPT

## 2022-05-13 PROCEDURE — 83550 IRON BINDING TEST: CPT

## 2022-05-16 LAB — TRANSGLUTAMINASE IGA: <2 U/ML (ref 0–3)

## 2022-05-20 ENCOUNTER — OFFICE VISIT (OUTPATIENT)
Dept: ONCOLOGY | Age: 72
End: 2022-05-20
Payer: MEDICARE

## 2022-05-20 ENCOUNTER — HOSPITAL ENCOUNTER (OUTPATIENT)
Dept: INFUSION THERAPY | Age: 72
Discharge: HOME | End: 2022-05-20

## 2022-05-20 VITALS
HEIGHT: 65 IN | TEMPERATURE: 96 F | OXYGEN SATURATION: 97 % | SYSTOLIC BLOOD PRESSURE: 168 MMHG | HEART RATE: 61 BPM | BODY MASS INDEX: 33.69 KG/M2 | DIASTOLIC BLOOD PRESSURE: 79 MMHG | WEIGHT: 202.2 LBS

## 2022-05-20 DIAGNOSIS — D50.0 IRON DEFICIENCY ANEMIA SECONDARY TO BLOOD LOSS (CHRONIC): Primary | ICD-10-CM

## 2022-05-20 PROCEDURE — 3017F COLORECTAL CA SCREEN DOC REV: CPT | Performed by: INTERNAL MEDICINE

## 2022-05-20 PROCEDURE — G8427 DOCREV CUR MEDS BY ELIG CLIN: HCPCS | Performed by: INTERNAL MEDICINE

## 2022-05-20 PROCEDURE — 1090F PRES/ABSN URINE INCON ASSESS: CPT | Performed by: INTERNAL MEDICINE

## 2022-05-20 PROCEDURE — 1036F TOBACCO NON-USER: CPT | Performed by: INTERNAL MEDICINE

## 2022-05-20 PROCEDURE — 99213 OFFICE O/P EST LOW 20 MIN: CPT | Performed by: INTERNAL MEDICINE

## 2022-05-20 PROCEDURE — G8417 CALC BMI ABV UP PARAM F/U: HCPCS | Performed by: INTERNAL MEDICINE

## 2022-05-20 PROCEDURE — G8400 PT W/DXA NO RESULTS DOC: HCPCS | Performed by: INTERNAL MEDICINE

## 2022-05-20 PROCEDURE — 1123F ACP DISCUSS/DSCN MKR DOCD: CPT | Performed by: INTERNAL MEDICINE

## 2022-05-20 NOTE — PROGRESS NOTES
MA Rooming Questions  Patient: Augie Sue  MRN: [id number]    Date: 5/20/2022        1. Do you have any new issues?   no         2. Do you need any refills on medications?    no    3. Have you had any imaging done since your last visit?   no    4. Have you been hospitalized or seen in the emergency room since your last visit here?   no    5. Did the patient have a depression screening completed today?  No    No data recorded     PHQ-9 Given to (if applicable):               PHQ-9 Score (if applicable):                     [] Positive     []  Negative              Does question #9 need addressed (if applicable)                     [] Yes    []  No               Kemi Platt CMA

## 2022-09-16 ENCOUNTER — TELEPHONE (OUTPATIENT)
Dept: ONCOLOGY | Age: 72
End: 2022-09-16

## 2022-09-16 DIAGNOSIS — D50.0 IRON DEFICIENCY ANEMIA SECONDARY TO BLOOD LOSS (CHRONIC): Primary | ICD-10-CM

## 2022-09-16 NOTE — TELEPHONE ENCOUNTER
This nurse returned the patient's call and she reports fatigue and shakiness that began a few weeks ago.  Patient requesting labs on Monday 9/19, patient placed on lab schedule at 11:00 for CBC, iron/TIBC, Ferritin and Vitamin B & Folate

## 2022-09-16 NOTE — TELEPHONE ENCOUNTER
Patient is feeling weak and shaky. Also having increase back pain. States this has been happening for the  last few weeks. Declined office visit, feels she needs labs drawn.

## 2022-09-19 ENCOUNTER — HOSPITAL ENCOUNTER (OUTPATIENT)
Dept: INFUSION THERAPY | Age: 72
Discharge: HOME | End: 2022-09-19
Payer: MEDICARE

## 2022-09-19 DIAGNOSIS — D50.0 IRON DEFICIENCY ANEMIA SECONDARY TO BLOOD LOSS (CHRONIC): ICD-10-CM

## 2022-09-19 LAB
DIFFERENTIAL TYPE: ABNORMAL
EOSINOPHILS ABSOLUTE: 0.1 K/CU MM
EOSINOPHILS RELATIVE PERCENT: 2 % (ref 0–3)
FERRITIN: 196 NG/ML (ref 15–150)
FOLATE: 5.8 NG/ML (ref 3.1–17.5)
HCT VFR BLD CALC: 44.5 % (ref 37–47)
HEMOGLOBIN: 14.5 GM/DL (ref 12.5–16)
IRON: 82 UG/DL (ref 37–145)
LYMPHOCYTES ABSOLUTE: 1.2 K/CU MM
LYMPHOCYTES RELATIVE PERCENT: 23 % (ref 24–44)
MCH RBC QN AUTO: 29.6 PG (ref 27–31)
MCHC RBC AUTO-ENTMCNC: 32.6 % (ref 32–36)
MCV RBC AUTO: 90.8 FL (ref 78–100)
MONOCYTES ABSOLUTE: 0.4 K/CU MM
MONOCYTES RELATIVE PERCENT: 8 % (ref 0–4)
PCT TRANSFERRIN: 34 % (ref 10–44)
PDW BLD-RTO: 14 % (ref 11.7–14.9)
PLATELET # BLD: 175 K/CU MM (ref 140–440)
PMV BLD AUTO: 11.4 FL (ref 7.5–11.1)
RBC # BLD: 4.9 M/CU MM (ref 4.2–5.4)
SEGMENTED NEUTROPHILS ABSOLUTE COUNT: 3.3 K/CU MM
SEGMENTED NEUTROPHILS RELATIVE PERCENT: 67 % (ref 36–66)
TOTAL IRON BINDING CAPACITY: 239 UG/DL (ref 250–450)
UNSATURATED IRON BINDING CAPACITY: 157 UG/DL (ref 110–370)
VITAMIN B-12: 314.9 PG/ML (ref 211–911)
WBC # BLD: 5 K/CU MM (ref 4–10.5)

## 2022-09-19 PROCEDURE — 85025 COMPLETE CBC W/AUTO DIFF WBC: CPT

## 2022-09-19 PROCEDURE — 82746 ASSAY OF FOLIC ACID SERUM: CPT

## 2022-09-19 PROCEDURE — 82607 VITAMIN B-12: CPT

## 2022-09-19 PROCEDURE — 83550 IRON BINDING TEST: CPT

## 2022-09-19 PROCEDURE — 82728 ASSAY OF FERRITIN: CPT

## 2022-09-19 PROCEDURE — 36415 COLL VENOUS BLD VENIPUNCTURE: CPT

## 2022-09-19 PROCEDURE — 83540 ASSAY OF IRON: CPT

## 2022-09-29 ENCOUNTER — TELEPHONE (OUTPATIENT)
Dept: ONCOLOGY | Age: 72
End: 2022-09-29

## 2022-09-30 NOTE — TELEPHONE ENCOUNTER
Called patient at [phone] to review lab results. Patient c/o generalized weakness and fatigue. Patient offered IV hydration, but declined at this time. Patient inquiring about iron infusion. Last infusion was in May 2022. Will discuss with physician and follow up with patient next week. Patient voices understanding.

## 2022-10-04 NOTE — TELEPHONE ENCOUNTER
Called patient at [phone] to notify that she does not need iron infusion at this time per physician instruction. Patient voices understanding. Denies further needs at this time.

## 2022-10-31 NOTE — PROGRESS NOTES
anemia was corrected. She had upper, lower endoscopic study and capsule endoscopic study in first quarter of 2018. She was prescribed Iron pill causing stomach discomfort. I advise to take QOD. She was found to have hiatal hernia and small gastric ulcer. She is on omeprazole. Labs in May 2019 showed ferritin at 11, TSH 0.377, white cell 4.0, hemoglobin 10.7, MCV 71.6, platelet 483. CMP was unremarkable. She received blood transfusion in January 2019. She stopped B12 injection in April or March 2019. She takes iron pill every 2 days due to constipation. Reportedly mammogram in December 2018 was benign. She is on antidepressant. She had Infed in August 2019. Ferritin improved to ~30 and Hg ~13.6. She takes prenatal vitamin with Iron every other day. Labs in December 2019 were reviewed. Hg and Ferritin improved. She started B-12 injection in December 2019. Reports she has resumed B12. She received INFeD in November 2020. She was agreeable to resume the B12 shot. She has been followed by Dr. Chio Morfin for the chronic knee pain. She received cortical cortisone shot and gel shot. On March 4, 2021 she had B-12 injection. Reportedly colonoscopy is not due until 2025. She is agreeable to try B12 sublingual.  Labs in May 2021 showed improvement of the hemoglobin at 11.9, ferritin at 54, B12 395.9. She had iron infusion 7/21. She reports energy has somewhat improved. 9/2 ferritin 132, iron 60, TIBC 245, Transferrin % 24. Labs on 12/7/2021 showed recurrent PRANAV. B-12 was 365.9 and ferritin 18. I strongly recommend to follow up with GI. She has seen Dr Clive Briggs before. She takes meloxicam PRN. She had Iron infusion, INFeD 1000 mg on 12/23/2021. She started feeling tired easily again. Denied any weight loss or night sweats. Ferritin in March 2022 was 19, iron was 36, transferrin saturation 11%. She takes meloxicam as needed.   I strongly recommend she follow-up with GI since I am concerned that she has GI bleeds. She had Infed on 4/5/2022. She started feeling better. Labs in May 2022 showed normal Hg with Hg of 13.5. EGD in May 2022 showed hiatal hernia. She had colonoscopy in 2018. She was recommended to take meloxicam with food. 11/28/2022 she came in for follow up visit. 11/2022 folate 4.9, B-12 324. CBC grossly wnl. Ferritin 83, TIBC 231. She has been taking mobic daily. Mammogram in 10/2022 benign. I recommend to try turmeric capsule. She is agreeable to have repeat CBC and Iron study prior to next OV. She has chronic pain. I  recommend to monitor blood pressure at home. Denied any nausea, vomiting or diarrhea. No fever or chills. No chest pain, shortness of breath. No headache or dizzy spell. No specific bone pain. No melena or hematochezia. Denied any dysuria or hematuria. Past Medical History  Allergic rhinitis, anemia of iron deficiency, diverticulosis of the colon, status post colonoscopy in December 2011, fibromyalgia, GERD, dyslipidemia, hypothyroidism, vitamin D deficiency. Surgical History   Breast biopsy, cholecystectomy in 2006 by Dr. Fidelia Burch. Review of Systems: \"Per interval history; otherwise 10 point ROS is negative. \"     Vital Signs:  BP (!) 181/84 (Site: Right Upper Arm, Position: Sitting)   Pulse 86   Temp 97.9 °F (36.6 °C) (Temporal)   Resp 16   Ht 5' 4.5\" (1.638 m)   Wt 189 lb 6.4 oz (85.9 kg)   SpO2 97%   BMI 32.01 kg/m²     Physical Exam:    CONSTITUTIONAL: awake, alert, cooperative, no apparent distress   EYES:sclera clear and normal conjunctiva. Pupils equal and round. ENT: Normocephalic, without obvious abnormality, atraumatic  NECK: supple, symmetrical.  No JVD. No goiter.   HEMATOLOGIC/LYMPHATIC: no cervical, supraclavicular or axillary lymphadenopathy   LUNGS: no increased work of breathing and clear to auscultation   CARDIOVASCULAR: regular rate and rhythm, normal S1 and S2, no murmur  ABDOMEN: normal bowel sound, soft, non-distended, non-tender, no masses palpated, no hepatosplenomegaly   MUSCULOSKELETAL: full range of motion noted, tone is normal  NEUROLOGIC: Motor skills grossly intact. CN II -XII  grossly intact. SKIN: Normal skin color, texture, turgor and no jaundice. No ecchymosis. EXTREMITIES: no LE edema or cyanosis. Labs:  Hematology:  Lab Results   Component Value Date    WBC 6.6 11/21/2022    RBC 4.69 11/21/2022    HGB 14.1 11/21/2022    HCT 43.1 11/21/2022    MCV 91.9 11/21/2022    MCH 30.1 11/21/2022    MCHC 32.7 11/21/2022    RDW 13.5 11/21/2022     11/21/2022    MPV 12.0 (H) 11/21/2022    SEGSPCT 66.0 11/21/2022    EOSRELPCT 2.1 11/21/2022    BASOPCT 0.5 11/21/2022    LYMPHOPCT 19.4 (L) 11/21/2022    MONOPCT 12.0 (H) 11/21/2022    SEGSABS 4.4 11/21/2022    EOSABS 0.1 11/21/2022    BASOSABS 0.0 11/21/2022    LYMPHSABS 1.3 11/21/2022    MONOSABS 0.8 11/21/2022    DIFFTYPE AUTOMATED DIFFERENTIAL 11/21/2022     Chemistry:  Lab Results   Component Value Date     12/02/2021    K 4.2 12/02/2021     12/02/2021    CO2 24 12/02/2021    BUN 23 12/02/2021    CREATININE 0.9 12/02/2021    GLUCOSE 105 (H) 12/02/2021    CALCIUM 9.0 12/02/2021    PROT 6.9 12/02/2021    LABALBU 4.7 12/02/2021    BILITOT 0.1 12/02/2021    ALKPHOS 100 12/02/2021    AST 15 12/02/2021    ALT 9 (L) 12/02/2021    LABGLOM >60 12/02/2021    GFRAA >60 12/02/2021     Immunology:  Lab Results   Component Value Date    PROT 6.9 12/02/2021     Anemia Panel:  Lab Results   Component Value Date    YFVFFCCS07 324.0 11/21/2022    FOLATE 4.9 11/21/2022      Observations:  Performance Status: 1  Symptoms, but ambulatory. Restricted in physically strenuous activity, but ambulatory and able to carry out work of a light or sedentary nature   Depression Status:   Cognitive Status: Oriented to person, time, and place     Assessment & Plan:  1. She had upper and lower endoscopic study by Dr. Nikki Tierney.  She received iron infusion more than five years ago.  She received INFeD in January 2018 with mild improvement of hemoglobin and ferritin. Upper and lower endoscopic study and capsule endoscopic study in March 2018 were performed by Dr Rosaline Thomson. Blood test in May 2019 was suggestive of low iron. She had Infed in August 2019. Ferritin is borderline low, Hg is improving. She had Infed in November 2019. Labs in December 2019 were reviewed. CBC and Ferritin improved. In June 2020 hemoglobin, B12 and iron dropped. She did not come for the B12 injection for a while. She resumed B12 shot December 2020. She received iron infusion in November 2020. She had B-12 injection in March 2021. She received INFeD in March 2021. Labs in May 2021 showed improvement of hemoglobin, iron and B12. Labs in July 2021 showed recurrent PRANAV. She had Iron infusion in July 2021. Labs in December 2021 showed recurrent PRANAV. Nia Ruffing She received iron infusion in December 2021. Labs in March 2022 showed low ferritin. She had INFeD infusionin April 2022. Labs in May 2022 showed improvement of Hg and Iron study. She had EGD in May 2022 which was negative. 11/2022 folate 4.9. B-12 324. CBC grossly wnl. Ferritin 83, TIBC 231. I will recheck Iron study and CBC prior to next OV. 2. Due to low B-12 level, she started on monthly SQ B-12 injection. She stopped B-12 injection in March or April 2019. B-12 in December 2019 was low. She resumed B-12 injection in December 2019. She resumed B12 injection in December 2020. I gave her the prescription of sublingual B12. She responded to oral B-12 SL.  B-12 on 12/7/2021 was 365.9. B-12 324 in 11/2022.    3. I advised her to keep her other age-appropriate cancer screening up to date. Mammogram in December 2018 was benign. Mammogram.in 10/2022 benign. 4. I advise to exercise. She could have chronic fatigue. She had Covid vaccine. RTC in 3 months sooner. All of her questions have been answered for today.      Recent imaging and labs were reviewed and discussed with the patient.

## 2022-11-21 ENCOUNTER — HOSPITAL ENCOUNTER (OUTPATIENT)
Dept: INFUSION THERAPY | Age: 72
Discharge: HOME | End: 2022-11-21
Payer: MEDICARE

## 2022-11-21 DIAGNOSIS — D50.0 IRON DEFICIENCY ANEMIA SECONDARY TO BLOOD LOSS (CHRONIC): ICD-10-CM

## 2022-11-21 LAB
BASOPHILS ABSOLUTE: 0 K/CU MM
BASOPHILS RELATIVE PERCENT: 0.5 % (ref 0–1)
DIFFERENTIAL TYPE: ABNORMAL
EOSINOPHILS ABSOLUTE: 0.1 K/CU MM
EOSINOPHILS RELATIVE PERCENT: 2.1 % (ref 0–3)
FERRITIN: 83 NG/ML (ref 15–150)
FOLATE: 4.9 NG/ML (ref 3.1–17.5)
HCT VFR BLD CALC: 43.1 % (ref 37–47)
HEMOGLOBIN: 14.1 GM/DL (ref 12.5–16)
IRON: 68 UG/DL (ref 37–145)
LYMPHOCYTES ABSOLUTE: 1.3 K/CU MM
LYMPHOCYTES RELATIVE PERCENT: 19.4 % (ref 24–44)
MCH RBC QN AUTO: 30.1 PG (ref 27–31)
MCHC RBC AUTO-ENTMCNC: 32.7 % (ref 32–36)
MCV RBC AUTO: 91.9 FL (ref 78–100)
MONOCYTES ABSOLUTE: 0.8 K/CU MM
MONOCYTES RELATIVE PERCENT: 12 % (ref 0–4)
PCT TRANSFERRIN: 29 % (ref 10–44)
PDW BLD-RTO: 13.5 % (ref 11.7–14.9)
PLATELET # BLD: 203 K/CU MM (ref 140–440)
PMV BLD AUTO: 12 FL (ref 7.5–11.1)
RBC # BLD: 4.69 M/CU MM (ref 4.2–5.4)
SEGMENTED NEUTROPHILS ABSOLUTE COUNT: 4.4 K/CU MM
SEGMENTED NEUTROPHILS RELATIVE PERCENT: 66 % (ref 36–66)
TOTAL IRON BINDING CAPACITY: 231 UG/DL (ref 250–450)
UNSATURATED IRON BINDING CAPACITY: 163 UG/DL (ref 110–370)
VITAMIN B-12: 324 PG/ML (ref 211–911)
WBC # BLD: 6.6 K/CU MM (ref 4–10.5)

## 2022-11-21 PROCEDURE — 85025 COMPLETE CBC W/AUTO DIFF WBC: CPT

## 2022-11-21 PROCEDURE — 83550 IRON BINDING TEST: CPT

## 2022-11-21 PROCEDURE — 82746 ASSAY OF FOLIC ACID SERUM: CPT

## 2022-11-21 PROCEDURE — 82728 ASSAY OF FERRITIN: CPT

## 2022-11-21 PROCEDURE — 82607 VITAMIN B-12: CPT

## 2022-11-21 PROCEDURE — 36415 COLL VENOUS BLD VENIPUNCTURE: CPT

## 2022-11-21 PROCEDURE — 83540 ASSAY OF IRON: CPT

## 2022-11-28 ENCOUNTER — HOSPITAL ENCOUNTER (OUTPATIENT)
Dept: INFUSION THERAPY | Age: 72
Discharge: HOME | End: 2022-11-28
Payer: MEDICARE

## 2022-11-28 ENCOUNTER — OFFICE VISIT (OUTPATIENT)
Dept: ONCOLOGY | Age: 72
End: 2022-11-28
Payer: MEDICARE

## 2022-11-28 VITALS
DIASTOLIC BLOOD PRESSURE: 84 MMHG | SYSTOLIC BLOOD PRESSURE: 181 MMHG | BODY MASS INDEX: 31.56 KG/M2 | OXYGEN SATURATION: 97 % | HEART RATE: 86 BPM | TEMPERATURE: 97.9 F | RESPIRATION RATE: 16 BRPM | HEIGHT: 65 IN | WEIGHT: 189.4 LBS

## 2022-11-28 DIAGNOSIS — D50.0 IRON DEFICIENCY ANEMIA SECONDARY TO BLOOD LOSS (CHRONIC): ICD-10-CM

## 2022-11-28 DIAGNOSIS — D64.9 ANEMIA, UNSPECIFIED TYPE: Primary | ICD-10-CM

## 2022-11-28 PROCEDURE — 1090F PRES/ABSN URINE INCON ASSESS: CPT | Performed by: INTERNAL MEDICINE

## 2022-11-28 PROCEDURE — G8400 PT W/DXA NO RESULTS DOC: HCPCS | Performed by: INTERNAL MEDICINE

## 2022-11-28 PROCEDURE — 3017F COLORECTAL CA SCREEN DOC REV: CPT | Performed by: INTERNAL MEDICINE

## 2022-11-28 PROCEDURE — G8484 FLU IMMUNIZE NO ADMIN: HCPCS | Performed by: INTERNAL MEDICINE

## 2022-11-28 PROCEDURE — G8417 CALC BMI ABV UP PARAM F/U: HCPCS | Performed by: INTERNAL MEDICINE

## 2022-11-28 PROCEDURE — G8427 DOCREV CUR MEDS BY ELIG CLIN: HCPCS | Performed by: INTERNAL MEDICINE

## 2022-11-28 PROCEDURE — 1123F ACP DISCUSS/DSCN MKR DOCD: CPT | Performed by: INTERNAL MEDICINE

## 2022-11-28 PROCEDURE — 99213 OFFICE O/P EST LOW 20 MIN: CPT | Performed by: INTERNAL MEDICINE

## 2022-11-28 PROCEDURE — 1036F TOBACCO NON-USER: CPT | Performed by: INTERNAL MEDICINE

## 2022-11-28 PROCEDURE — 99211 OFF/OP EST MAY X REQ PHY/QHP: CPT

## 2022-11-28 ASSESSMENT — PATIENT HEALTH QUESTIONNAIRE - PHQ9
SUM OF ALL RESPONSES TO PHQ QUESTIONS 1-9: 2
2. FEELING DOWN, DEPRESSED OR HOPELESS: 1
SUM OF ALL RESPONSES TO PHQ QUESTIONS 1-9: 2
1. LITTLE INTEREST OR PLEASURE IN DOING THINGS: 1
SUM OF ALL RESPONSES TO PHQ QUESTIONS 1-9: 2
SUM OF ALL RESPONSES TO PHQ QUESTIONS 1-9: 2
SUM OF ALL RESPONSES TO PHQ9 QUESTIONS 1 & 2: 2

## 2023-04-04 ENCOUNTER — CLINICAL DOCUMENTATION (OUTPATIENT)
Dept: ONCOLOGY | Age: 73
End: 2023-04-04

## 2023-04-04 DIAGNOSIS — D64.9 ANEMIA, UNSPECIFIED TYPE: ICD-10-CM

## 2023-04-04 DIAGNOSIS — D50.0 IRON DEFICIENCY ANEMIA SECONDARY TO BLOOD LOSS (CHRONIC): Primary | ICD-10-CM

## 2023-04-04 DIAGNOSIS — K90.9 INTESTINAL MALABSORPTION, UNSPECIFIED TYPE: ICD-10-CM

## 2023-04-04 NOTE — PROGRESS NOTES
This nurse received a VM from the patient requesting to be scheduled for an OV and labs. This nurse called the patient @ [phone] to discuss her concerns and she reports extreme fatigue and weakness. The patient states she is spending a majority of her day lying down. The patient was given her choice of dated to come in to the center for labs and she is available at 11:45 on Thursday April 6, 2023. The patient was also scheduled for an OV with Dr Siobhan Wayne on April 12, 2023 @ 11:15. Lab orders placed. The patient verbalized understanding and denies further needs at this time.

## 2023-04-06 ENCOUNTER — HOSPITAL ENCOUNTER (OUTPATIENT)
Dept: INFUSION THERAPY | Age: 73
Discharge: HOME | End: 2023-04-06
Payer: MEDICARE

## 2023-04-06 DIAGNOSIS — D50.0 IRON DEFICIENCY ANEMIA SECONDARY TO BLOOD LOSS (CHRONIC): ICD-10-CM

## 2023-04-06 DIAGNOSIS — K90.9 INTESTINAL MALABSORPTION, UNSPECIFIED TYPE: ICD-10-CM

## 2023-04-06 DIAGNOSIS — D64.9 ANEMIA, UNSPECIFIED TYPE: ICD-10-CM

## 2023-04-06 LAB
BASOPHILS ABSOLUTE: 0 K/CU MM
BASOPHILS RELATIVE PERCENT: 0.7 % (ref 0–1)
DIFFERENTIAL TYPE: ABNORMAL
EOSINOPHILS ABSOLUTE: 0.2 K/CU MM
EOSINOPHILS RELATIVE PERCENT: 3.3 % (ref 0–3)
FERRITIN: 21 NG/ML (ref 15–150)
HCT VFR BLD CALC: 35.3 % (ref 37–47)
HEMOGLOBIN: 11.1 GM/DL (ref 12.5–16)
IRON: 37 UG/DL (ref 37–145)
LYMPHOCYTES ABSOLUTE: 1.3 K/CU MM
LYMPHOCYTES RELATIVE PERCENT: 23 % (ref 24–44)
MCH RBC QN AUTO: 28.2 PG (ref 27–31)
MCHC RBC AUTO-ENTMCNC: 31.4 % (ref 32–36)
MCV RBC AUTO: 89.8 FL (ref 78–100)
MONOCYTES ABSOLUTE: 0.4 K/CU MM
MONOCYTES RELATIVE PERCENT: 7.6 % (ref 0–4)
PCT TRANSFERRIN: 11 % (ref 10–44)
PDW BLD-RTO: 14.1 % (ref 11.7–14.9)
PLATELET # BLD: 228 K/CU MM (ref 140–440)
PMV BLD AUTO: 11.3 FL (ref 7.5–11.1)
RBC # BLD: 3.93 M/CU MM (ref 4.2–5.4)
SEGMENTED NEUTROPHILS ABSOLUTE COUNT: 3.8 K/CU MM
SEGMENTED NEUTROPHILS RELATIVE PERCENT: 65.4 % (ref 36–66)
TOTAL IRON BINDING CAPACITY: 347 UG/DL (ref 250–450)
UNSATURATED IRON BINDING CAPACITY: 310 UG/DL (ref 110–370)
VITAMIN B-12: 233 PG/ML (ref 211–911)
WBC # BLD: 5.8 K/CU MM (ref 4–10.5)

## 2023-04-06 PROCEDURE — 36415 COLL VENOUS BLD VENIPUNCTURE: CPT

## 2023-04-06 PROCEDURE — 83540 ASSAY OF IRON: CPT

## 2023-04-06 PROCEDURE — 82607 VITAMIN B-12: CPT

## 2023-04-06 PROCEDURE — 82746 ASSAY OF FOLIC ACID SERUM: CPT

## 2023-04-06 PROCEDURE — 82728 ASSAY OF FERRITIN: CPT

## 2023-04-06 PROCEDURE — 83550 IRON BINDING TEST: CPT

## 2023-04-06 PROCEDURE — 85025 COMPLETE CBC W/AUTO DIFF WBC: CPT

## 2023-04-12 ENCOUNTER — HOSPITAL ENCOUNTER (OUTPATIENT)
Dept: INFUSION THERAPY | Age: 73
End: 2023-04-12
Payer: MEDICARE

## 2023-04-12 ENCOUNTER — HOSPITAL ENCOUNTER (OUTPATIENT)
Dept: INFUSION THERAPY | Age: 73
Discharge: HOME | End: 2023-04-12
Payer: MEDICARE

## 2023-04-12 DIAGNOSIS — D50.0 IRON DEFICIENCY ANEMIA SECONDARY TO BLOOD LOSS (CHRONIC): ICD-10-CM

## 2023-04-12 DIAGNOSIS — K90.9 INTESTINAL MALABSORPTION, UNSPECIFIED TYPE: Primary | ICD-10-CM

## 2023-04-12 PROCEDURE — 6360000002 HC RX W HCPCS: Performed by: INTERNAL MEDICINE

## 2023-04-12 PROCEDURE — 96372 THER/PROPH/DIAG INJ SC/IM: CPT

## 2023-04-12 RX ORDER — CYANOCOBALAMIN 1000 UG/ML
1000 INJECTION, SOLUTION INTRAMUSCULAR; SUBCUTANEOUS ONCE
Status: COMPLETED | OUTPATIENT
Start: 2023-04-12 | End: 2023-04-12

## 2023-04-12 RX ORDER — EPINEPHRINE 1 MG/ML
0.3 INJECTION, SOLUTION, CONCENTRATE INTRAVENOUS PRN
OUTPATIENT
Start: 2023-05-10

## 2023-04-12 RX ORDER — ALBUTEROL SULFATE 90 UG/1
4 AEROSOL, METERED RESPIRATORY (INHALATION) PRN
OUTPATIENT
Start: 2023-05-10

## 2023-04-12 RX ORDER — ACETAMINOPHEN 325 MG/1
650 TABLET ORAL
OUTPATIENT
Start: 2023-05-10

## 2023-04-12 RX ORDER — DIPHENHYDRAMINE HYDROCHLORIDE 50 MG/ML
50 INJECTION INTRAMUSCULAR; INTRAVENOUS
OUTPATIENT
Start: 2023-05-10

## 2023-04-12 RX ORDER — ONDANSETRON 2 MG/ML
8 INJECTION INTRAMUSCULAR; INTRAVENOUS
OUTPATIENT
Start: 2023-05-10

## 2023-04-12 RX ORDER — CYANOCOBALAMIN 1000 UG/ML
1000 INJECTION, SOLUTION INTRAMUSCULAR; SUBCUTANEOUS ONCE
OUTPATIENT
Start: 2023-05-10 | End: 2023-05-10

## 2023-04-12 RX ORDER — FAMOTIDINE 10 MG/ML
20 INJECTION, SOLUTION INTRAVENOUS
OUTPATIENT
Start: 2023-05-10

## 2023-04-12 RX ORDER — SODIUM CHLORIDE 9 MG/ML
INJECTION, SOLUTION INTRAVENOUS CONTINUOUS
OUTPATIENT
Start: 2023-05-10

## 2023-04-12 RX ADMIN — CYANOCOBALAMIN 1000 MCG: 1000 INJECTION, SOLUTION INTRAMUSCULAR at 12:08

## 2023-04-12 NOTE — PROGRESS NOTES
Ambulated to infusion area after office visit. B12 injection administered as ordered. Tolerated well. Discharged in stable condition. Patient sent to checkout to scheduled next appts.

## 2023-04-25 ENCOUNTER — TELEPHONE (OUTPATIENT)
Dept: ONCOLOGY | Age: 73
End: 2023-04-25

## 2023-04-25 NOTE — TELEPHONE ENCOUNTER
Tried calling patient to schedule iron infusion, no answer and had to leave a VM to call me back, left my direct phone number.

## 2023-04-25 NOTE — TELEPHONE ENCOUNTER
Patient returned my call to schedule iron infusion, patient is scheduled for Wed. 04/26, 05/03 and 05/10 @ 1100, advised patient to notify us asap if unable to keep their appt time and if appt is No Showed or No Call, rescheduled appt may take up to 3 months to schedule, also advised that 1 visitor allowed at time of infusion, patient states understanding

## 2023-04-26 ENCOUNTER — HOSPITAL ENCOUNTER (OUTPATIENT)
Dept: INFUSION THERAPY | Age: 73
Discharge: HOME | End: 2023-04-26
Payer: MEDICARE

## 2023-04-26 VITALS
HEIGHT: 64 IN | DIASTOLIC BLOOD PRESSURE: 66 MMHG | SYSTOLIC BLOOD PRESSURE: 141 MMHG | OXYGEN SATURATION: 95 % | HEART RATE: 73 BPM | WEIGHT: 204.6 LBS | RESPIRATION RATE: 16 BRPM | BODY MASS INDEX: 34.93 KG/M2

## 2023-04-26 DIAGNOSIS — D50.0 IRON DEFICIENCY ANEMIA SECONDARY TO BLOOD LOSS (CHRONIC): ICD-10-CM

## 2023-04-26 DIAGNOSIS — K90.9 INTESTINAL MALABSORPTION, UNSPECIFIED TYPE: Primary | ICD-10-CM

## 2023-04-26 PROCEDURE — 2580000003 HC RX 258: Performed by: INTERNAL MEDICINE

## 2023-04-26 PROCEDURE — 96365 THER/PROPH/DIAG IV INF INIT: CPT

## 2023-04-26 PROCEDURE — 96366 THER/PROPH/DIAG IV INF ADDON: CPT

## 2023-04-26 PROCEDURE — 6360000002 HC RX W HCPCS: Performed by: INTERNAL MEDICINE

## 2023-04-26 RX ORDER — SODIUM CHLORIDE 9 MG/ML
INJECTION, SOLUTION INTRAVENOUS CONTINUOUS
Status: CANCELLED | OUTPATIENT
Start: 2023-05-03

## 2023-04-26 RX ORDER — DIPHENHYDRAMINE HYDROCHLORIDE 50 MG/ML
50 INJECTION INTRAMUSCULAR; INTRAVENOUS
Status: CANCELLED | OUTPATIENT
Start: 2023-05-03

## 2023-04-26 RX ORDER — SODIUM CHLORIDE 9 MG/ML
5-250 INJECTION, SOLUTION INTRAVENOUS PRN
Status: CANCELLED | OUTPATIENT
Start: 2023-05-03

## 2023-04-26 RX ORDER — SODIUM CHLORIDE 9 MG/ML
5-250 INJECTION, SOLUTION INTRAVENOUS PRN
Status: DISCONTINUED | OUTPATIENT
Start: 2023-04-26 | End: 2023-04-27 | Stop reason: HOSPADM

## 2023-04-26 RX ORDER — ONDANSETRON 2 MG/ML
8 INJECTION INTRAMUSCULAR; INTRAVENOUS
Status: CANCELLED | OUTPATIENT
Start: 2023-05-03

## 2023-04-26 RX ORDER — ALBUTEROL SULFATE 90 UG/1
4 AEROSOL, METERED RESPIRATORY (INHALATION) PRN
Status: CANCELLED | OUTPATIENT
Start: 2023-05-03

## 2023-04-26 RX ORDER — EPINEPHRINE 1 MG/ML
0.3 INJECTION, SOLUTION, CONCENTRATE INTRAVENOUS PRN
Status: CANCELLED | OUTPATIENT
Start: 2023-05-03

## 2023-04-26 RX ORDER — FAMOTIDINE 10 MG/ML
20 INJECTION, SOLUTION INTRAVENOUS
Status: CANCELLED | OUTPATIENT
Start: 2023-05-03

## 2023-04-26 RX ORDER — ACETAMINOPHEN 325 MG/1
650 TABLET ORAL
Status: CANCELLED | OUTPATIENT
Start: 2023-05-03

## 2023-04-26 RX ORDER — HEPARIN SODIUM (PORCINE) LOCK FLUSH IV SOLN 100 UNIT/ML 100 UNIT/ML
500 SOLUTION INTRAVENOUS PRN
Status: CANCELLED | OUTPATIENT
Start: 2023-05-03

## 2023-04-26 RX ORDER — SODIUM CHLORIDE 0.9 % (FLUSH) 0.9 %
5-40 SYRINGE (ML) INJECTION PRN
Status: CANCELLED | OUTPATIENT
Start: 2023-05-03

## 2023-04-26 RX ADMIN — IRON SUCROSE 300 MG: 20 INJECTION, SOLUTION INTRAVENOUS at 11:57

## 2023-04-26 RX ADMIN — SODIUM CHLORIDE 20 ML/HR: 9 INJECTION, SOLUTION INTRAVENOUS at 11:56

## 2023-04-26 NOTE — PROGRESS NOTES
Pt here for 1/3 Venofer infusion. PIV placed blood return noted. Pt states she feels fatigued. No other issues noted. Infusion complete pt observed for 30 minutes after infusion and pt tolerated without incident.  Left ambulatory discharge instructions given

## 2023-05-03 ENCOUNTER — HOSPITAL ENCOUNTER (OUTPATIENT)
Dept: INFUSION THERAPY | Age: 73
Discharge: HOME | End: 2023-05-03
Payer: MEDICARE

## 2023-05-03 VITALS
OXYGEN SATURATION: 94 % | HEIGHT: 64 IN | BODY MASS INDEX: 34.42 KG/M2 | TEMPERATURE: 97.6 F | DIASTOLIC BLOOD PRESSURE: 67 MMHG | SYSTOLIC BLOOD PRESSURE: 140 MMHG | WEIGHT: 201.6 LBS | HEART RATE: 75 BPM

## 2023-05-03 DIAGNOSIS — D50.0 IRON DEFICIENCY ANEMIA SECONDARY TO BLOOD LOSS (CHRONIC): ICD-10-CM

## 2023-05-03 DIAGNOSIS — K90.9 INTESTINAL MALABSORPTION, UNSPECIFIED TYPE: Primary | ICD-10-CM

## 2023-05-03 PROCEDURE — 2580000003 HC RX 258: Performed by: INTERNAL MEDICINE

## 2023-05-03 PROCEDURE — 96365 THER/PROPH/DIAG IV INF INIT: CPT

## 2023-05-03 PROCEDURE — 96367 TX/PROPH/DG ADDL SEQ IV INF: CPT

## 2023-05-03 PROCEDURE — 96366 THER/PROPH/DIAG IV INF ADDON: CPT

## 2023-05-03 PROCEDURE — 6360000002 HC RX W HCPCS: Performed by: INTERNAL MEDICINE

## 2023-05-03 RX ORDER — ONDANSETRON 2 MG/ML
8 INJECTION INTRAMUSCULAR; INTRAVENOUS
Status: CANCELLED | OUTPATIENT
Start: 2023-05-10

## 2023-05-03 RX ORDER — SODIUM CHLORIDE 9 MG/ML
5-250 INJECTION, SOLUTION INTRAVENOUS PRN
Status: CANCELLED | OUTPATIENT
Start: 2023-05-10

## 2023-05-03 RX ORDER — ALBUTEROL SULFATE 90 UG/1
4 AEROSOL, METERED RESPIRATORY (INHALATION) PRN
Status: CANCELLED | OUTPATIENT
Start: 2023-05-10

## 2023-05-03 RX ORDER — EPINEPHRINE 1 MG/ML
0.3 INJECTION, SOLUTION, CONCENTRATE INTRAVENOUS PRN
Status: CANCELLED | OUTPATIENT
Start: 2023-05-10

## 2023-05-03 RX ORDER — FAMOTIDINE 10 MG/ML
20 INJECTION, SOLUTION INTRAVENOUS
Status: CANCELLED | OUTPATIENT
Start: 2023-05-10

## 2023-05-03 RX ORDER — SODIUM CHLORIDE 9 MG/ML
INJECTION, SOLUTION INTRAVENOUS CONTINUOUS
Status: CANCELLED | OUTPATIENT
Start: 2023-05-10

## 2023-05-03 RX ORDER — DIPHENHYDRAMINE HYDROCHLORIDE 50 MG/ML
50 INJECTION INTRAMUSCULAR; INTRAVENOUS
Status: CANCELLED | OUTPATIENT
Start: 2023-05-10

## 2023-05-03 RX ORDER — HEPARIN SODIUM (PORCINE) LOCK FLUSH IV SOLN 100 UNIT/ML 100 UNIT/ML
500 SOLUTION INTRAVENOUS PRN
Status: CANCELLED | OUTPATIENT
Start: 2023-05-10

## 2023-05-03 RX ORDER — SODIUM CHLORIDE 0.9 % (FLUSH) 0.9 %
5-40 SYRINGE (ML) INJECTION PRN
Status: CANCELLED | OUTPATIENT
Start: 2023-05-10

## 2023-05-03 RX ORDER — SODIUM CHLORIDE 9 MG/ML
5-250 INJECTION, SOLUTION INTRAVENOUS PRN
Status: DISCONTINUED | OUTPATIENT
Start: 2023-05-03 | End: 2023-05-04 | Stop reason: HOSPADM

## 2023-05-03 RX ORDER — ACETAMINOPHEN 325 MG/1
650 TABLET ORAL
Status: CANCELLED | OUTPATIENT
Start: 2023-05-10

## 2023-05-03 RX ADMIN — IRON SUCROSE 300 MG: 20 INJECTION, SOLUTION INTRAVENOUS at 12:09

## 2023-05-03 RX ADMIN — SODIUM CHLORIDE 20 ML/HR: 9 INJECTION, SOLUTION INTRAVENOUS at 12:09

## 2023-05-03 NOTE — PROGRESS NOTES
Pt here for 2/3 Venofer infusion. PIV placed blood return noted. Pt has no concerns at this time. Infusion complete pt observed for 30 minutes after and tolerated without incident.  Left ambulatory discharge instructions given

## 2023-05-10 ENCOUNTER — HOSPITAL ENCOUNTER (OUTPATIENT)
Dept: INFUSION THERAPY | Age: 73
Discharge: HOME | End: 2023-05-10
Payer: MEDICARE

## 2023-05-10 VITALS
HEART RATE: 70 BPM | WEIGHT: 204 LBS | TEMPERATURE: 97.3 F | BODY MASS INDEX: 34.83 KG/M2 | SYSTOLIC BLOOD PRESSURE: 150 MMHG | HEIGHT: 64 IN | OXYGEN SATURATION: 95 % | DIASTOLIC BLOOD PRESSURE: 69 MMHG

## 2023-05-10 DIAGNOSIS — D50.0 IRON DEFICIENCY ANEMIA SECONDARY TO BLOOD LOSS (CHRONIC): ICD-10-CM

## 2023-05-10 DIAGNOSIS — K90.9 INTESTINAL MALABSORPTION, UNSPECIFIED TYPE: Primary | ICD-10-CM

## 2023-05-10 PROCEDURE — 96365 THER/PROPH/DIAG IV INF INIT: CPT

## 2023-05-10 PROCEDURE — 96366 THER/PROPH/DIAG IV INF ADDON: CPT

## 2023-05-10 PROCEDURE — 6360000002 HC RX W HCPCS: Performed by: INTERNAL MEDICINE

## 2023-05-10 PROCEDURE — 2580000003 HC RX 258: Performed by: INTERNAL MEDICINE

## 2023-05-10 RX ORDER — EPINEPHRINE 1 MG/ML
0.3 INJECTION, SOLUTION, CONCENTRATE INTRAVENOUS PRN
OUTPATIENT
Start: 2023-05-10

## 2023-05-10 RX ORDER — SODIUM CHLORIDE 9 MG/ML
5-250 INJECTION, SOLUTION INTRAVENOUS PRN
OUTPATIENT
Start: 2023-05-10

## 2023-05-10 RX ORDER — SODIUM CHLORIDE 9 MG/ML
5-250 INJECTION, SOLUTION INTRAVENOUS PRN
Status: DISCONTINUED | OUTPATIENT
Start: 2023-05-10 | End: 2023-05-11 | Stop reason: HOSPADM

## 2023-05-10 RX ORDER — SODIUM CHLORIDE 0.9 % (FLUSH) 0.9 %
5-40 SYRINGE (ML) INJECTION PRN
OUTPATIENT
Start: 2023-05-10

## 2023-05-10 RX ORDER — ALBUTEROL SULFATE 90 UG/1
4 AEROSOL, METERED RESPIRATORY (INHALATION) PRN
OUTPATIENT
Start: 2023-05-10

## 2023-05-10 RX ORDER — SODIUM CHLORIDE 9 MG/ML
INJECTION, SOLUTION INTRAVENOUS CONTINUOUS
OUTPATIENT
Start: 2023-05-10

## 2023-05-10 RX ORDER — HEPARIN SODIUM (PORCINE) LOCK FLUSH IV SOLN 100 UNIT/ML 100 UNIT/ML
500 SOLUTION INTRAVENOUS PRN
OUTPATIENT
Start: 2023-05-10

## 2023-05-10 RX ORDER — ACETAMINOPHEN 325 MG/1
650 TABLET ORAL
OUTPATIENT
Start: 2023-05-10

## 2023-05-10 RX ORDER — DIPHENHYDRAMINE HYDROCHLORIDE 50 MG/ML
50 INJECTION INTRAMUSCULAR; INTRAVENOUS
OUTPATIENT
Start: 2023-05-10

## 2023-05-10 RX ORDER — FAMOTIDINE 10 MG/ML
20 INJECTION, SOLUTION INTRAVENOUS
OUTPATIENT
Start: 2023-05-10

## 2023-05-10 RX ORDER — ONDANSETRON 2 MG/ML
8 INJECTION INTRAMUSCULAR; INTRAVENOUS
OUTPATIENT
Start: 2023-05-10

## 2023-05-10 RX ADMIN — IRON SUCROSE 300 MG: 20 INJECTION, SOLUTION INTRAVENOUS at 11:59

## 2023-05-10 RX ADMIN — SODIUM CHLORIDE 20 ML/HR: 9 INJECTION, SOLUTION INTRAVENOUS at 11:58

## 2023-05-10 NOTE — PROGRESS NOTES
Pt ambulated into treatment area for Venofer infusion. Pt states she has had infusions in the past with no issues. Currently has no issues to address with physician at this time. PIV started in LT forearm. Treatment authorized and administered as ordered. Pt tolerated well. 30 min post infusion observation completed. Pt left treatment area ambulatory. AVS provided.

## 2023-05-18 ENCOUNTER — HOSPITAL ENCOUNTER (OUTPATIENT)
Dept: INFUSION THERAPY | Age: 73
Discharge: HOME | End: 2023-05-18
Payer: MEDICARE

## 2023-05-18 DIAGNOSIS — K90.9 INTESTINAL MALABSORPTION, UNSPECIFIED TYPE: Primary | ICD-10-CM

## 2023-05-18 DIAGNOSIS — D50.0 IRON DEFICIENCY ANEMIA SECONDARY TO BLOOD LOSS (CHRONIC): ICD-10-CM

## 2023-05-18 PROCEDURE — 96372 THER/PROPH/DIAG INJ SC/IM: CPT

## 2023-05-18 PROCEDURE — 6360000002 HC RX W HCPCS

## 2023-05-18 RX ORDER — CYANOCOBALAMIN 1000 UG/ML
INJECTION, SOLUTION INTRAMUSCULAR; SUBCUTANEOUS
Status: COMPLETED
Start: 2023-05-18 | End: 2023-05-18

## 2023-05-18 RX ORDER — CYANOCOBALAMIN 1000 UG/ML
1000 INJECTION, SOLUTION INTRAMUSCULAR; SUBCUTANEOUS ONCE
Status: COMPLETED | OUTPATIENT
Start: 2023-05-18 | End: 2023-05-18

## 2023-05-18 RX ORDER — ACETAMINOPHEN 325 MG/1
650 TABLET ORAL
OUTPATIENT
Start: 2023-06-08

## 2023-05-18 RX ORDER — FAMOTIDINE 10 MG/ML
20 INJECTION, SOLUTION INTRAVENOUS
OUTPATIENT
Start: 2023-06-08

## 2023-05-18 RX ORDER — ALBUTEROL SULFATE 90 UG/1
4 AEROSOL, METERED RESPIRATORY (INHALATION) PRN
OUTPATIENT
Start: 2023-06-08

## 2023-05-18 RX ORDER — EPINEPHRINE 1 MG/ML
0.3 INJECTION, SOLUTION, CONCENTRATE INTRAVENOUS PRN
OUTPATIENT
Start: 2023-06-08

## 2023-05-18 RX ORDER — SODIUM CHLORIDE 9 MG/ML
INJECTION, SOLUTION INTRAVENOUS CONTINUOUS
OUTPATIENT
Start: 2023-06-08

## 2023-05-18 RX ORDER — DIPHENHYDRAMINE HYDROCHLORIDE 50 MG/ML
50 INJECTION INTRAMUSCULAR; INTRAVENOUS
OUTPATIENT
Start: 2023-06-08

## 2023-05-18 RX ORDER — ONDANSETRON 2 MG/ML
8 INJECTION INTRAMUSCULAR; INTRAVENOUS
OUTPATIENT
Start: 2023-06-08

## 2023-05-18 RX ORDER — CYANOCOBALAMIN 1000 UG/ML
1000 INJECTION, SOLUTION INTRAMUSCULAR; SUBCUTANEOUS ONCE
OUTPATIENT
Start: 2023-06-08 | End: 2023-06-08

## 2023-05-18 RX ADMIN — CYANOCOBALAMIN 1000 MCG: 1000 INJECTION, SOLUTION INTRAMUSCULAR; SUBCUTANEOUS at 15:51

## 2023-05-18 RX ADMIN — CYANOCOBALAMIN 1000 MCG: 1000 INJECTION, SOLUTION INTRAMUSCULAR at 15:51

## 2023-05-18 NOTE — PROGRESS NOTES
Pt ambulated into treatment area for B-12 injection. Currently has no issues to address with physician at this time. Treatment authorized and administered as ordered in RT deltoid. Pt tolerated well. Pt left treatment area ambulatory.

## 2023-06-06 ENCOUNTER — HOSPITAL ENCOUNTER (OUTPATIENT)
Dept: INFUSION THERAPY | Age: 73
Discharge: HOME | End: 2023-06-06
Payer: MEDICARE

## 2023-06-06 DIAGNOSIS — D50.0 IRON DEFICIENCY ANEMIA SECONDARY TO BLOOD LOSS (CHRONIC): ICD-10-CM

## 2023-06-06 DIAGNOSIS — D64.9 ANEMIA, UNSPECIFIED TYPE: ICD-10-CM

## 2023-06-06 LAB
BASOPHILS ABSOLUTE: 0 K/CU MM
BASOPHILS RELATIVE PERCENT: 0.2 % (ref 0–1)
DIFFERENTIAL TYPE: ABNORMAL
EOSINOPHILS ABSOLUTE: 0.2 K/CU MM
EOSINOPHILS RELATIVE PERCENT: 3.1 % (ref 0–3)
FERRITIN: 100 NG/ML (ref 15–150)
FOLATE SERPL-MCNC: 8.7 NG/ML (ref 3.1–17.5)
HCT VFR BLD CALC: 43.7 % (ref 37–47)
HEMOGLOBIN: 13.6 GM/DL (ref 12.5–16)
IRON: 56 UG/DL (ref 37–145)
LYMPHOCYTES ABSOLUTE: 1 K/CU MM
LYMPHOCYTES RELATIVE PERCENT: 17.8 % (ref 24–44)
MCH RBC QN AUTO: 28.1 PG (ref 27–31)
MCHC RBC AUTO-ENTMCNC: 31.1 % (ref 32–36)
MCV RBC AUTO: 90.3 FL (ref 78–100)
MONOCYTES ABSOLUTE: 0.4 K/CU MM
MONOCYTES RELATIVE PERCENT: 8.1 % (ref 0–4)
PCT TRANSFERRIN: 19 % (ref 10–44)
PDW BLD-RTO: 17.6 % (ref 11.7–14.9)
PLATELET # BLD: 180 K/CU MM (ref 140–440)
PMV BLD AUTO: 11.6 FL (ref 7.5–11.1)
RBC # BLD: 4.84 M/CU MM (ref 4.2–5.4)
SEGMENTED NEUTROPHILS ABSOLUTE COUNT: 3.8 K/CU MM
SEGMENTED NEUTROPHILS RELATIVE PERCENT: 70.8 % (ref 36–66)
TOTAL IRON BINDING CAPACITY: 292 UG/DL (ref 250–450)
UNSATURATED IRON BINDING CAPACITY: 236 UG/DL (ref 110–370)
VITAMIN B-12: 467.2 PG/ML (ref 211–911)
WBC # BLD: 5.4 K/CU MM (ref 4–10.5)

## 2023-06-06 PROCEDURE — 36415 COLL VENOUS BLD VENIPUNCTURE: CPT

## 2023-06-06 PROCEDURE — 85025 COMPLETE CBC W/AUTO DIFF WBC: CPT

## 2023-06-06 PROCEDURE — 83540 ASSAY OF IRON: CPT

## 2023-06-06 PROCEDURE — 83550 IRON BINDING TEST: CPT

## 2023-06-06 PROCEDURE — 82607 VITAMIN B-12: CPT

## 2023-06-06 PROCEDURE — 82728 ASSAY OF FERRITIN: CPT

## 2023-06-06 PROCEDURE — 82746 ASSAY OF FOLIC ACID SERUM: CPT

## 2023-06-16 ENCOUNTER — HOSPITAL ENCOUNTER (OUTPATIENT)
Dept: INFUSION THERAPY | Age: 73
Discharge: HOME | End: 2023-06-16
Payer: MEDICARE

## 2023-06-16 DIAGNOSIS — D50.0 IRON DEFICIENCY ANEMIA SECONDARY TO BLOOD LOSS (CHRONIC): ICD-10-CM

## 2023-06-16 DIAGNOSIS — K90.9 INTESTINAL MALABSORPTION, UNSPECIFIED TYPE: Primary | ICD-10-CM

## 2023-06-16 PROCEDURE — 6360000002 HC RX W HCPCS: Performed by: INTERNAL MEDICINE

## 2023-06-16 PROCEDURE — 96372 THER/PROPH/DIAG INJ SC/IM: CPT

## 2023-06-16 RX ORDER — CYANOCOBALAMIN 1000 UG/ML
1000 INJECTION, SOLUTION INTRAMUSCULAR; SUBCUTANEOUS ONCE
Status: COMPLETED | OUTPATIENT
Start: 2023-06-16 | End: 2023-06-16

## 2023-06-16 RX ORDER — SODIUM CHLORIDE 9 MG/ML
INJECTION, SOLUTION INTRAVENOUS CONTINUOUS
OUTPATIENT
Start: 2023-07-14

## 2023-06-16 RX ORDER — DIPHENHYDRAMINE HYDROCHLORIDE 50 MG/ML
50 INJECTION INTRAMUSCULAR; INTRAVENOUS
OUTPATIENT
Start: 2023-07-14

## 2023-06-16 RX ORDER — ALBUTEROL SULFATE 90 UG/1
4 AEROSOL, METERED RESPIRATORY (INHALATION) PRN
OUTPATIENT
Start: 2023-07-14

## 2023-06-16 RX ORDER — ACETAMINOPHEN 325 MG/1
650 TABLET ORAL
OUTPATIENT
Start: 2023-07-14

## 2023-06-16 RX ORDER — FAMOTIDINE 10 MG/ML
20 INJECTION, SOLUTION INTRAVENOUS
OUTPATIENT
Start: 2023-07-14

## 2023-06-16 RX ORDER — EPINEPHRINE 1 MG/ML
0.3 INJECTION, SOLUTION, CONCENTRATE INTRAVENOUS PRN
OUTPATIENT
Start: 2023-07-14

## 2023-06-16 RX ORDER — CYANOCOBALAMIN 1000 UG/ML
1000 INJECTION, SOLUTION INTRAMUSCULAR; SUBCUTANEOUS ONCE
OUTPATIENT
Start: 2023-07-14 | End: 2023-07-14

## 2023-06-16 RX ORDER — ONDANSETRON 2 MG/ML
8 INJECTION INTRAMUSCULAR; INTRAVENOUS
OUTPATIENT
Start: 2023-07-14

## 2023-06-16 RX ADMIN — CYANOCOBALAMIN 1000 MCG: 1000 INJECTION, SOLUTION INTRAMUSCULAR at 11:10

## 2023-06-16 NOTE — PROGRESS NOTES
Ambulated to infusion area after office visit with Dr. Rafia Redd. B12 injection administered as ordered by Heidi Barragan. Tolerated well. Discharged in stable condition. Patient sent to checkout to schedule next office visit. Next B12 injection verified.

## 2023-08-23 NOTE — PROGRESS NOTES
Patient Name: Nina Gould  Patient : 1950  Patient MRN: [id number]     Primary Oncologist: Giovana Calero MD  Referring Provider: Robert Nieto MD     Date of Service: 2023      Chief Complaint:   Chief Complaint   Patient presents with    Follow-up     She came in for follow-up visit. Problem List:      Iron deficiency anemia secondary to blood loss (chronic)     Vitamin B12 deficiency anemia     Personal history of other diseases of the digestive system     HPI:   Adilia Denton is a pleasant 44-year-old  female patient with an underlying history of anemia of iron deficiency, who was referred for consideration of iron infusion. She received parenteral iron in  without any problems. She was seen by Dr. Arden Grijalva previously and had colonoscopy and upper endoscopic study more than five years ago. She denied any melena or hematochezia. She received blood transfusion in 2017. At that time, hemoglobin was 7. Stool occult was several years back. 2017 CMP within normal limits. White cell count was 6.2, hemoglobin 7.2, hematocrit 25, MCV 59.2, platelet 130. Ferritin was 6, B12 266, TSH 2.28. She tried iron pills, which caused her to have irritation of the stomach and constipation. 2017 iron 21. BMP was within normal limits. White cell count was 5.4, hemoglobin 9.2, hematocrit 29.7, MCV 71.1, platelet 558, TSH 6.095. Ferritin was 9 and B12 was 258. She had left cataract surgery on 2017. She had right eye cataract surgeryIn 2018 and hematoma below the right eye. She received iron infusion in 2018 with mild improvement of hemoglobin and ferritin. Iron saturation remained low. She is on omeprazole for acid reflux. Blood tests in 2018 revealed hemoglobin 9.6 MCV 81.5 ferritin 38. B12 was borderline therefore I started B12 injection SQ monthly. She could not tolerate iron by mouth. In 2018 anemia was corrected.  She had

## 2023-09-15 ENCOUNTER — HOSPITAL ENCOUNTER (OUTPATIENT)
Dept: INFUSION THERAPY | Age: 73
Discharge: HOME | End: 2023-09-15
Payer: MEDICARE

## 2023-09-15 DIAGNOSIS — D64.9 ANEMIA, UNSPECIFIED TYPE: ICD-10-CM

## 2023-09-15 DIAGNOSIS — K90.9 INTESTINAL MALABSORPTION, UNSPECIFIED TYPE: Primary | ICD-10-CM

## 2023-09-15 DIAGNOSIS — D50.0 IRON DEFICIENCY ANEMIA SECONDARY TO BLOOD LOSS (CHRONIC): ICD-10-CM

## 2023-09-15 LAB
BASOPHILS ABSOLUTE: 0 K/CU MM
BASOPHILS RELATIVE PERCENT: 0.7 % (ref 0–1)
DIFFERENTIAL TYPE: ABNORMAL
EOSINOPHILS ABSOLUTE: 0.1 K/CU MM
EOSINOPHILS RELATIVE PERCENT: 3 % (ref 0–3)
FERRITIN: 21 NG/ML (ref 15–150)
HCT VFR BLD CALC: 35.9 % (ref 37–47)
HEMOGLOBIN: 11.3 GM/DL (ref 12.5–16)
IRON: 47 UG/DL (ref 37–145)
LYMPHOCYTES ABSOLUTE: 1 K/CU MM
LYMPHOCYTES RELATIVE PERCENT: 21.7 % (ref 24–44)
MCH RBC QN AUTO: 25.9 PG (ref 27–31)
MCHC RBC AUTO-ENTMCNC: 31.5 % (ref 32–36)
MCV RBC AUTO: 82.3 FL (ref 78–100)
MONOCYTES ABSOLUTE: 0.4 K/CU MM
MONOCYTES RELATIVE PERCENT: 8.7 % (ref 0–4)
PCT TRANSFERRIN: 14 % (ref 10–44)
PDW BLD-RTO: 15.4 % (ref 11.7–14.9)
PLATELET # BLD: 223 K/CU MM (ref 140–440)
PMV BLD AUTO: 10.8 FL (ref 7.5–11.1)
RBC # BLD: 4.36 M/CU MM (ref 4.2–5.4)
SEGMENTED NEUTROPHILS ABSOLUTE COUNT: 3 K/CU MM
SEGMENTED NEUTROPHILS RELATIVE PERCENT: 65.9 % (ref 36–66)
TOTAL IRON BINDING CAPACITY: 338 UG/DL (ref 250–450)
UNSATURATED IRON BINDING CAPACITY: 291 UG/DL (ref 110–370)
VITAMIN B-12: 412.9 PG/ML (ref 211–911)
WBC # BLD: 4.6 K/CU MM (ref 4–10.5)

## 2023-09-15 PROCEDURE — 83550 IRON BINDING TEST: CPT

## 2023-09-15 PROCEDURE — 6360000002 HC RX W HCPCS

## 2023-09-15 PROCEDURE — 36415 COLL VENOUS BLD VENIPUNCTURE: CPT

## 2023-09-15 PROCEDURE — 85025 COMPLETE CBC W/AUTO DIFF WBC: CPT

## 2023-09-15 PROCEDURE — 82728 ASSAY OF FERRITIN: CPT

## 2023-09-15 PROCEDURE — 82746 ASSAY OF FOLIC ACID SERUM: CPT

## 2023-09-15 PROCEDURE — 96372 THER/PROPH/DIAG INJ SC/IM: CPT

## 2023-09-15 PROCEDURE — 83540 ASSAY OF IRON: CPT

## 2023-09-15 PROCEDURE — 82607 VITAMIN B-12: CPT

## 2023-09-15 RX ORDER — SODIUM CHLORIDE 9 MG/ML
INJECTION, SOLUTION INTRAVENOUS CONTINUOUS
OUTPATIENT
Start: 2023-10-06

## 2023-09-15 RX ORDER — CYANOCOBALAMIN 1000 UG/ML
INJECTION, SOLUTION INTRAMUSCULAR; SUBCUTANEOUS
Status: COMPLETED
Start: 2023-09-15 | End: 2023-09-15

## 2023-09-15 RX ORDER — ACETAMINOPHEN 325 MG/1
650 TABLET ORAL
OUTPATIENT
Start: 2023-10-06

## 2023-09-15 RX ORDER — FAMOTIDINE 10 MG/ML
20 INJECTION, SOLUTION INTRAVENOUS
OUTPATIENT
Start: 2023-10-06

## 2023-09-15 RX ORDER — EPINEPHRINE 1 MG/ML
0.3 INJECTION, SOLUTION, CONCENTRATE INTRAVENOUS PRN
OUTPATIENT
Start: 2023-10-06

## 2023-09-15 RX ORDER — ONDANSETRON 2 MG/ML
8 INJECTION INTRAMUSCULAR; INTRAVENOUS
OUTPATIENT
Start: 2023-10-06

## 2023-09-15 RX ORDER — DIPHENHYDRAMINE HYDROCHLORIDE 50 MG/ML
50 INJECTION INTRAMUSCULAR; INTRAVENOUS
OUTPATIENT
Start: 2023-10-06

## 2023-09-15 RX ORDER — ALBUTEROL SULFATE 90 UG/1
4 AEROSOL, METERED RESPIRATORY (INHALATION) PRN
OUTPATIENT
Start: 2023-10-06

## 2023-09-15 RX ORDER — CYANOCOBALAMIN 1000 UG/ML
1000 INJECTION, SOLUTION INTRAMUSCULAR; SUBCUTANEOUS ONCE
OUTPATIENT
Start: 2023-10-06 | End: 2023-10-06

## 2023-09-15 RX ORDER — CYANOCOBALAMIN 1000 UG/ML
1000 INJECTION, SOLUTION INTRAMUSCULAR; SUBCUTANEOUS ONCE
Status: COMPLETED | OUTPATIENT
Start: 2023-09-15 | End: 2023-09-15

## 2023-09-15 RX ADMIN — CYANOCOBALAMIN 1000 MCG: 1000 INJECTION, SOLUTION INTRAMUSCULAR; SUBCUTANEOUS at 13:30

## 2023-09-15 RX ADMIN — CYANOCOBALAMIN 1000 MCG: 1000 INJECTION, SOLUTION INTRAMUSCULAR at 13:30

## 2023-09-15 NOTE — PROGRESS NOTES
Ambulated to infusion area. B12 injection administered as ordered. Tolerated well. Discharged in stable condition. Declined AVS. Next appt verified with patient.

## 2023-09-18 LAB
FOLATE SERPL-MCNC: 8.1 NG/ML (ref 3.1–17.5)
VITAMIN B-12: 412.9 PG/ML (ref 211–911)

## 2023-09-22 ENCOUNTER — HOSPITAL ENCOUNTER (OUTPATIENT)
Dept: INFUSION THERAPY | Age: 73
Discharge: HOME | End: 2023-09-22
Payer: MEDICARE

## 2023-09-22 ENCOUNTER — OFFICE VISIT (OUTPATIENT)
Dept: ONCOLOGY | Age: 73
End: 2023-09-22
Payer: MEDICARE

## 2023-09-22 VITALS
OXYGEN SATURATION: 98 % | WEIGHT: 199.4 LBS | HEART RATE: 89 BPM | SYSTOLIC BLOOD PRESSURE: 148 MMHG | DIASTOLIC BLOOD PRESSURE: 65 MMHG | TEMPERATURE: 97.2 F | BODY MASS INDEX: 34.04 KG/M2 | HEIGHT: 64 IN

## 2023-09-22 DIAGNOSIS — D64.9 ANEMIA, UNSPECIFIED TYPE: Primary | ICD-10-CM

## 2023-09-22 PROCEDURE — 1036F TOBACCO NON-USER: CPT | Performed by: INTERNAL MEDICINE

## 2023-09-22 PROCEDURE — 1123F ACP DISCUSS/DSCN MKR DOCD: CPT | Performed by: INTERNAL MEDICINE

## 2023-09-22 PROCEDURE — 99213 OFFICE O/P EST LOW 20 MIN: CPT | Performed by: INTERNAL MEDICINE

## 2023-09-22 PROCEDURE — G8427 DOCREV CUR MEDS BY ELIG CLIN: HCPCS | Performed by: INTERNAL MEDICINE

## 2023-09-22 PROCEDURE — 1090F PRES/ABSN URINE INCON ASSESS: CPT | Performed by: INTERNAL MEDICINE

## 2023-09-22 PROCEDURE — 3017F COLORECTAL CA SCREEN DOC REV: CPT | Performed by: INTERNAL MEDICINE

## 2023-09-22 PROCEDURE — 99211 OFF/OP EST MAY X REQ PHY/QHP: CPT

## 2023-09-22 PROCEDURE — G8417 CALC BMI ABV UP PARAM F/U: HCPCS | Performed by: INTERNAL MEDICINE

## 2023-09-22 PROCEDURE — G8400 PT W/DXA NO RESULTS DOC: HCPCS | Performed by: INTERNAL MEDICINE

## 2023-09-22 ASSESSMENT — PATIENT HEALTH QUESTIONNAIRE - PHQ9
SUM OF ALL RESPONSES TO PHQ QUESTIONS 1-9: 0
SUM OF ALL RESPONSES TO PHQ9 QUESTIONS 1 & 2: 0
SUM OF ALL RESPONSES TO PHQ QUESTIONS 1-9: 0
1. LITTLE INTEREST OR PLEASURE IN DOING THINGS: 0
2. FEELING DOWN, DEPRESSED OR HOPELESS: 0

## 2023-09-22 NOTE — PROGRESS NOTES
MA Rooming Questions  Patient: Ryanne Morse  MRN: [id number]    Date: 9/22/2023        1. Do you have any new issues? yes - VERY FATIGUE          2. Do you need any refills on medications?    no    3. Have you had any imaging done since your last visit? yes - LABS 9/15    4. Have you been hospitalized or seen in the emergency room since your last visit here?   no    5. Did the patient have a depression screening completed today?  Yes    PHQ-9 Total Score: 0 (9/22/2023  1:14 PM)       PHQ-9 Given to (if applicable):               PHQ-9 Score (if applicable):                     [] Positive     []  Negative              Does question #9 need addressed (if applicable)                     [] Yes    []  No               Harinder Galan CMA

## 2023-09-26 ENCOUNTER — TELEPHONE (OUTPATIENT)
Dept: INFUSION THERAPY | Age: 73
End: 2023-09-26

## 2023-10-08 RX ORDER — DIPHENHYDRAMINE HYDROCHLORIDE 50 MG/ML
50 INJECTION INTRAMUSCULAR; INTRAVENOUS
OUTPATIENT
Start: 2023-10-08

## 2023-10-08 RX ORDER — SODIUM CHLORIDE 9 MG/ML
INJECTION, SOLUTION INTRAVENOUS CONTINUOUS
OUTPATIENT
Start: 2023-10-08

## 2023-10-08 RX ORDER — EPINEPHRINE 1 MG/ML
0.3 INJECTION, SOLUTION, CONCENTRATE INTRAVENOUS PRN
OUTPATIENT
Start: 2023-10-08

## 2023-10-08 RX ORDER — HEPARIN SODIUM (PORCINE) LOCK FLUSH IV SOLN 100 UNIT/ML 100 UNIT/ML
500 SOLUTION INTRAVENOUS PRN
OUTPATIENT
Start: 2023-10-08

## 2023-10-08 RX ORDER — ACETAMINOPHEN 325 MG/1
650 TABLET ORAL
OUTPATIENT
Start: 2023-10-08

## 2023-10-08 RX ORDER — SODIUM CHLORIDE 9 MG/ML
5-250 INJECTION, SOLUTION INTRAVENOUS PRN
OUTPATIENT
Start: 2023-10-08

## 2023-10-08 RX ORDER — FAMOTIDINE 10 MG/ML
20 INJECTION, SOLUTION INTRAVENOUS
OUTPATIENT
Start: 2023-10-08

## 2023-10-08 RX ORDER — SODIUM CHLORIDE 0.9 % (FLUSH) 0.9 %
5-40 SYRINGE (ML) INJECTION PRN
OUTPATIENT
Start: 2023-10-08

## 2023-10-08 RX ORDER — ACETAMINOPHEN 325 MG/1
650 TABLET ORAL ONCE
OUTPATIENT
Start: 2023-10-08

## 2023-10-08 RX ORDER — ALBUTEROL SULFATE 90 UG/1
4 AEROSOL, METERED RESPIRATORY (INHALATION) PRN
OUTPATIENT
Start: 2023-10-08

## 2023-10-08 RX ORDER — MEPERIDINE HYDROCHLORIDE 50 MG/ML
12.5 INJECTION INTRAMUSCULAR; INTRAVENOUS; SUBCUTANEOUS PRN
OUTPATIENT
Start: 2023-10-08

## 2023-10-08 RX ORDER — ONDANSETRON 2 MG/ML
8 INJECTION INTRAMUSCULAR; INTRAVENOUS
OUTPATIENT
Start: 2023-10-08

## 2023-10-09 ENCOUNTER — HOSPITAL ENCOUNTER (OUTPATIENT)
Dept: INFUSION THERAPY | Age: 73
Discharge: HOME | End: 2023-10-09
Payer: MEDICARE

## 2023-10-09 VITALS
DIASTOLIC BLOOD PRESSURE: 63 MMHG | BODY MASS INDEX: 33.32 KG/M2 | SYSTOLIC BLOOD PRESSURE: 145 MMHG | WEIGHT: 195.2 LBS | TEMPERATURE: 97.9 F | HEIGHT: 64 IN | OXYGEN SATURATION: 97 %

## 2023-10-09 DIAGNOSIS — D50.0 IRON DEFICIENCY ANEMIA SECONDARY TO BLOOD LOSS (CHRONIC): ICD-10-CM

## 2023-10-09 DIAGNOSIS — K90.9 INTESTINAL MALABSORPTION, UNSPECIFIED TYPE: Primary | ICD-10-CM

## 2023-10-09 DIAGNOSIS — Z87.19 PERSONAL HISTORY OF OTHER DISEASES OF THE DIGESTIVE SYSTEM: ICD-10-CM

## 2023-10-09 PROCEDURE — 6370000000 HC RX 637 (ALT 250 FOR IP): Performed by: INTERNAL MEDICINE

## 2023-10-09 PROCEDURE — 96372 THER/PROPH/DIAG INJ SC/IM: CPT

## 2023-10-09 PROCEDURE — 96367 TX/PROPH/DG ADDL SEQ IV INF: CPT

## 2023-10-09 PROCEDURE — 6360000002 HC RX W HCPCS: Performed by: INTERNAL MEDICINE

## 2023-10-09 PROCEDURE — 2580000003 HC RX 258: Performed by: INTERNAL MEDICINE

## 2023-10-09 PROCEDURE — 96365 THER/PROPH/DIAG IV INF INIT: CPT

## 2023-10-09 PROCEDURE — 96366 THER/PROPH/DIAG IV INF ADDON: CPT

## 2023-10-09 RX ORDER — ACETAMINOPHEN 325 MG/1
650 TABLET ORAL ONCE
Status: COMPLETED | OUTPATIENT
Start: 2023-10-09 | End: 2023-10-09

## 2023-10-09 RX ORDER — CYANOCOBALAMIN 1000 UG/ML
1000 INJECTION, SOLUTION INTRAMUSCULAR; SUBCUTANEOUS ONCE
OUTPATIENT
Start: 2023-11-06 | End: 2023-11-06

## 2023-10-09 RX ORDER — EPINEPHRINE 1 MG/ML
0.3 INJECTION, SOLUTION, CONCENTRATE INTRAVENOUS PRN
OUTPATIENT
Start: 2023-11-06

## 2023-10-09 RX ORDER — ALBUTEROL SULFATE 90 UG/1
4 AEROSOL, METERED RESPIRATORY (INHALATION) PRN
OUTPATIENT
Start: 2023-11-06

## 2023-10-09 RX ORDER — CYANOCOBALAMIN 1000 UG/ML
1000 INJECTION, SOLUTION INTRAMUSCULAR; SUBCUTANEOUS ONCE
Status: COMPLETED | OUTPATIENT
Start: 2023-10-09 | End: 2023-10-09

## 2023-10-09 RX ORDER — SODIUM CHLORIDE 9 MG/ML
INJECTION, SOLUTION INTRAVENOUS CONTINUOUS
OUTPATIENT
Start: 2023-11-06

## 2023-10-09 RX ORDER — ACETAMINOPHEN 325 MG/1
650 TABLET ORAL
OUTPATIENT
Start: 2023-11-06

## 2023-10-09 RX ORDER — DIPHENHYDRAMINE HYDROCHLORIDE 50 MG/ML
50 INJECTION INTRAMUSCULAR; INTRAVENOUS
OUTPATIENT
Start: 2023-11-06

## 2023-10-09 RX ORDER — HEPARIN 100 UNIT/ML
500 SYRINGE INTRAVENOUS PRN
OUTPATIENT
Start: 2023-10-17

## 2023-10-09 RX ORDER — ACETAMINOPHEN 325 MG/1
650 TABLET ORAL ONCE
Status: CANCELLED | OUTPATIENT
Start: 2023-10-17

## 2023-10-09 RX ORDER — FAMOTIDINE 10 MG/ML
20 INJECTION, SOLUTION INTRAVENOUS
OUTPATIENT
Start: 2023-11-06

## 2023-10-09 RX ORDER — SODIUM CHLORIDE 9 MG/ML
5-250 INJECTION, SOLUTION INTRAVENOUS PRN
Status: DISCONTINUED | OUTPATIENT
Start: 2023-10-09 | End: 2023-10-10 | Stop reason: HOSPADM

## 2023-10-09 RX ORDER — SODIUM CHLORIDE 9 MG/ML
5-250 INJECTION, SOLUTION INTRAVENOUS PRN
Status: CANCELLED | OUTPATIENT
Start: 2023-10-17

## 2023-10-09 RX ORDER — DIPHENHYDRAMINE HYDROCHLORIDE 50 MG/ML
50 INJECTION INTRAMUSCULAR; INTRAVENOUS
OUTPATIENT
Start: 2023-10-17

## 2023-10-09 RX ORDER — MEPERIDINE HYDROCHLORIDE 25 MG/ML
12.5 INJECTION INTRAMUSCULAR; INTRAVENOUS; SUBCUTANEOUS PRN
OUTPATIENT
Start: 2023-10-17

## 2023-10-09 RX ORDER — SODIUM CHLORIDE 9 MG/ML
INJECTION, SOLUTION INTRAVENOUS CONTINUOUS
OUTPATIENT
Start: 2023-10-17

## 2023-10-09 RX ORDER — ACETAMINOPHEN 325 MG/1
650 TABLET ORAL
OUTPATIENT
Start: 2023-10-17

## 2023-10-09 RX ORDER — SODIUM CHLORIDE 0.9 % (FLUSH) 0.9 %
5-40 SYRINGE (ML) INJECTION PRN
OUTPATIENT
Start: 2023-10-17

## 2023-10-09 RX ORDER — ONDANSETRON 2 MG/ML
8 INJECTION INTRAMUSCULAR; INTRAVENOUS
OUTPATIENT
Start: 2023-11-06

## 2023-10-09 RX ORDER — FAMOTIDINE 10 MG/ML
20 INJECTION, SOLUTION INTRAVENOUS
OUTPATIENT
Start: 2023-10-17

## 2023-10-09 RX ORDER — EPINEPHRINE 1 MG/ML
0.3 INJECTION, SOLUTION, CONCENTRATE INTRAVENOUS PRN
OUTPATIENT
Start: 2023-10-17

## 2023-10-09 RX ORDER — ALBUTEROL SULFATE 90 UG/1
4 AEROSOL, METERED RESPIRATORY (INHALATION) PRN
OUTPATIENT
Start: 2023-10-17

## 2023-10-09 RX ORDER — SODIUM CHLORIDE 9 MG/ML
5-250 INJECTION, SOLUTION INTRAVENOUS PRN
OUTPATIENT
Start: 2023-10-17

## 2023-10-09 RX ORDER — ONDANSETRON 2 MG/ML
8 INJECTION INTRAMUSCULAR; INTRAVENOUS
OUTPATIENT
Start: 2023-10-17

## 2023-10-09 RX ADMIN — CYANOCOBALAMIN 1000 MCG: 1000 INJECTION, SOLUTION INTRAMUSCULAR at 09:03

## 2023-10-09 RX ADMIN — SODIUM CHLORIDE 975 MG: 9 INJECTION, SOLUTION INTRAVENOUS at 10:53

## 2023-10-09 RX ADMIN — SODIUM CHLORIDE 20 ML/HR: 9 INJECTION, SOLUTION INTRAVENOUS at 09:27

## 2023-10-09 RX ADMIN — ACETAMINOPHEN 650 MG: 325 TABLET ORAL at 10:19

## 2023-10-09 RX ADMIN — SODIUM CHLORIDE 25 MG: 9 INJECTION, SOLUTION INTRAVENOUS at 09:28

## 2023-10-09 RX ADMIN — DEXAMETHASONE SODIUM PHOSPHATE 10 MG: 10 INJECTION, SOLUTION INTRAMUSCULAR; INTRAVENOUS at 10:26

## 2023-10-09 ASSESSMENT — PAIN SCALES - GENERAL: PAINLEVEL_OUTOF10: 0

## 2023-10-09 NOTE — PROGRESS NOTES
Pt arrived to suite for Infed infusion. Assessment complete, pt denies concerns/questions prior to treatment today. Treatment plan approved, released and completed as ordered. Pt has had Infed in the past, but not within the last year. PIV placed without difficulty. Flushed and locked with normal saline. Test dose completed as ordered. Pt observed for 1 hour post test dose. Pt tolerated well without reaction. Pre meds given as ordered. Remainder of Infed dose completed as ordered. PIV removed per protocol. Pt tolerated well. AVS provided. Discharge ambulatory in stable condition.   Mimi Villalpando RN

## 2023-11-06 ENCOUNTER — HOSPITAL ENCOUNTER (OUTPATIENT)
Dept: INFUSION THERAPY | Age: 73
Discharge: HOME | End: 2023-11-06
Payer: MEDICARE

## 2023-11-06 DIAGNOSIS — D50.0 IRON DEFICIENCY ANEMIA SECONDARY TO BLOOD LOSS (CHRONIC): ICD-10-CM

## 2023-11-06 DIAGNOSIS — K90.9 INTESTINAL MALABSORPTION, UNSPECIFIED TYPE: Primary | ICD-10-CM

## 2023-11-06 PROCEDURE — 96372 THER/PROPH/DIAG INJ SC/IM: CPT

## 2023-11-06 PROCEDURE — 6360000002 HC RX W HCPCS

## 2023-11-06 RX ORDER — ACETAMINOPHEN 325 MG/1
650 TABLET ORAL
OUTPATIENT
Start: 2023-12-04

## 2023-11-06 RX ORDER — CYANOCOBALAMIN 1000 UG/ML
1000 INJECTION, SOLUTION INTRAMUSCULAR; SUBCUTANEOUS ONCE
OUTPATIENT
Start: 2023-12-04 | End: 2023-12-04

## 2023-11-06 RX ORDER — FAMOTIDINE 10 MG/ML
20 INJECTION, SOLUTION INTRAVENOUS
OUTPATIENT
Start: 2023-12-04

## 2023-11-06 RX ORDER — ONDANSETRON 2 MG/ML
8 INJECTION INTRAMUSCULAR; INTRAVENOUS
OUTPATIENT
Start: 2023-12-04

## 2023-11-06 RX ORDER — CYANOCOBALAMIN 1000 UG/ML
INJECTION, SOLUTION INTRAMUSCULAR; SUBCUTANEOUS
Status: DISCONTINUED
Start: 2023-11-06 | End: 2023-11-07 | Stop reason: HOSPADM

## 2023-11-06 RX ORDER — ALBUTEROL SULFATE 90 UG/1
4 AEROSOL, METERED RESPIRATORY (INHALATION) PRN
OUTPATIENT
Start: 2023-12-04

## 2023-11-06 RX ORDER — EPINEPHRINE 1 MG/ML
0.3 INJECTION, SOLUTION, CONCENTRATE INTRAVENOUS PRN
OUTPATIENT
Start: 2023-12-04

## 2023-11-06 RX ORDER — CYANOCOBALAMIN 1000 UG/ML
1000 INJECTION, SOLUTION INTRAMUSCULAR; SUBCUTANEOUS ONCE
Status: DISCONTINUED | OUTPATIENT
Start: 2023-11-06 | End: 2023-11-07 | Stop reason: HOSPADM

## 2023-11-06 RX ORDER — DIPHENHYDRAMINE HYDROCHLORIDE 50 MG/ML
50 INJECTION INTRAMUSCULAR; INTRAVENOUS
OUTPATIENT
Start: 2023-12-04

## 2023-11-06 RX ORDER — SODIUM CHLORIDE 9 MG/ML
INJECTION, SOLUTION INTRAVENOUS CONTINUOUS
OUTPATIENT
Start: 2023-12-04

## 2023-11-06 NOTE — PROGRESS NOTES
Pt arrived to treatment suite for B12 injection. Treatment plan approved, released and completed. Injection given IM in left arm. Pt tolerated well. AVS declined. Pt would like to have appointments reviewed. Message given to [address] [facility]. Will call patient tomorrow to clarify. Discharge in stable condition.   Mariana Lira RN

## 2023-12-13 ENCOUNTER — HOSPITAL ENCOUNTER (OUTPATIENT)
Dept: INFUSION THERAPY | Age: 73
Discharge: HOME | End: 2023-12-13
Payer: MEDICARE

## 2023-12-13 DIAGNOSIS — K90.9 INTESTINAL MALABSORPTION, UNSPECIFIED TYPE: ICD-10-CM

## 2023-12-13 DIAGNOSIS — D50.0 IRON DEFICIENCY ANEMIA SECONDARY TO BLOOD LOSS (CHRONIC): Primary | ICD-10-CM

## 2023-12-13 DIAGNOSIS — D64.9 ANEMIA, UNSPECIFIED TYPE: ICD-10-CM

## 2023-12-13 LAB
BASOPHILS ABSOLUTE: 0 K/CU MM
BASOPHILS RELATIVE PERCENT: 0.6 % (ref 0–1)
DIFFERENTIAL TYPE: ABNORMAL
EOSINOPHILS ABSOLUTE: 0.2 K/CU MM
EOSINOPHILS RELATIVE PERCENT: 2.3 % (ref 0–3)
FERRITIN: 61 NG/ML (ref 15–150)
FOLATE SERPL-MCNC: 4.7 NG/ML (ref 3.1–17.5)
HCT VFR BLD CALC: 42.3 % (ref 37–47)
HEMOGLOBIN: 13.5 GM/DL (ref 12.5–16)
IRON: 81 UG/DL (ref 37–145)
LYMPHOCYTES ABSOLUTE: 1.2 K/CU MM
LYMPHOCYTES RELATIVE PERCENT: 18.1 % (ref 24–44)
MCH RBC QN AUTO: 27.7 PG (ref 27–31)
MCHC RBC AUTO-ENTMCNC: 31.9 % (ref 32–36)
MCV RBC AUTO: 86.7 FL (ref 78–100)
MONOCYTES ABSOLUTE: 0.6 K/CU MM
MONOCYTES RELATIVE PERCENT: 9.5 % (ref 0–4)
PCT TRANSFERRIN: 27 % (ref 10–44)
PDW BLD-RTO: 19 % (ref 11.7–14.9)
PLATELET # BLD: 209 K/CU MM (ref 140–440)
PMV BLD AUTO: 11.4 FL (ref 7.5–11.1)
RBC # BLD: 4.88 M/CU MM (ref 4.2–5.4)
SEGMENTED NEUTROPHILS ABSOLUTE COUNT: 4.6 K/CU MM
SEGMENTED NEUTROPHILS RELATIVE PERCENT: 69.5 % (ref 36–66)
TOTAL IRON BINDING CAPACITY: 297 UG/DL (ref 250–450)
UNSATURATED IRON BINDING CAPACITY: 216 UG/DL (ref 110–370)
VITAMIN B-12: 429.9 PG/ML (ref 211–911)
WBC # BLD: 6.6 K/CU MM (ref 4–10.5)

## 2023-12-13 PROCEDURE — 82746 ASSAY OF FOLIC ACID SERUM: CPT

## 2023-12-13 PROCEDURE — 83540 ASSAY OF IRON: CPT

## 2023-12-13 PROCEDURE — 6360000002 HC RX W HCPCS: Performed by: INTERNAL MEDICINE

## 2023-12-13 PROCEDURE — 82607 VITAMIN B-12: CPT

## 2023-12-13 PROCEDURE — 83550 IRON BINDING TEST: CPT

## 2023-12-13 PROCEDURE — 82728 ASSAY OF FERRITIN: CPT

## 2023-12-13 PROCEDURE — 36415 COLL VENOUS BLD VENIPUNCTURE: CPT

## 2023-12-13 PROCEDURE — 96372 THER/PROPH/DIAG INJ SC/IM: CPT

## 2023-12-13 PROCEDURE — 85025 COMPLETE CBC W/AUTO DIFF WBC: CPT

## 2023-12-13 RX ORDER — ACETAMINOPHEN 325 MG/1
650 TABLET ORAL
OUTPATIENT
Start: 2024-01-03

## 2023-12-13 RX ORDER — CYANOCOBALAMIN 1000 UG/ML
1000 INJECTION, SOLUTION INTRAMUSCULAR; SUBCUTANEOUS ONCE
Status: COMPLETED | OUTPATIENT
Start: 2023-12-13 | End: 2023-12-13

## 2023-12-13 RX ORDER — ALBUTEROL SULFATE 90 UG/1
4 AEROSOL, METERED RESPIRATORY (INHALATION) PRN
OUTPATIENT
Start: 2024-01-03

## 2023-12-13 RX ORDER — FAMOTIDINE 10 MG/ML
20 INJECTION, SOLUTION INTRAVENOUS
OUTPATIENT
Start: 2024-01-03

## 2023-12-13 RX ORDER — SODIUM CHLORIDE 9 MG/ML
INJECTION, SOLUTION INTRAVENOUS CONTINUOUS
OUTPATIENT
Start: 2024-01-03

## 2023-12-13 RX ORDER — DIPHENHYDRAMINE HYDROCHLORIDE 50 MG/ML
50 INJECTION INTRAMUSCULAR; INTRAVENOUS
OUTPATIENT
Start: 2024-01-03

## 2023-12-13 RX ORDER — ONDANSETRON 2 MG/ML
8 INJECTION INTRAMUSCULAR; INTRAVENOUS
OUTPATIENT
Start: 2024-01-03

## 2023-12-13 RX ORDER — CYANOCOBALAMIN 1000 UG/ML
1000 INJECTION, SOLUTION INTRAMUSCULAR; SUBCUTANEOUS ONCE
OUTPATIENT
Start: 2024-01-03 | End: 2024-01-03

## 2023-12-13 RX ORDER — EPINEPHRINE 1 MG/ML
0.3 INJECTION, SOLUTION, CONCENTRATE INTRAVENOUS PRN
OUTPATIENT
Start: 2024-01-03

## 2023-12-13 RX ADMIN — CYANOCOBALAMIN 1000 MCG: 1000 INJECTION, SOLUTION INTRAMUSCULAR at 12:12

## 2023-12-13 NOTE — PROGRESS NOTES
Patient arrived to treatment suite for Vitamin B-12 injection. No questions or concerns for the doctor at this time. Treatment approved and given IM in left deltoid, band-aid applied. Patient tolerated well. Left treatment suite ambulatory. Discharge instructions declined.

## 2023-12-20 ENCOUNTER — HOSPITAL ENCOUNTER (OUTPATIENT)
Dept: INFUSION THERAPY | Age: 73
Discharge: HOME | End: 2023-12-20
Payer: MEDICARE

## 2023-12-20 PROCEDURE — 99211 OFF/OP EST MAY X REQ PHY/QHP: CPT

## 2024-03-11 ENCOUNTER — TELEPHONE (OUTPATIENT)
Dept: INFUSION THERAPY | Age: 74
End: 2024-03-11

## 2024-05-01 NOTE — TELEPHONE ENCOUNTER
Called patient to schedule iron infusion, patient is scheduled for Thurs.  12/23 @ 0815, advised patient to notify us asap if unable to keep her appt time and if appt is No Showed, rescheduled appt may take up to 3 months to schedule, patient states understanding Insurance Verification Request      Ordering Center:     [facility]  [address] [address] [phone]  Wound Center Phone Number [phone]  GIUSEPPE HARVEY RN  Facility NPI: [id number]  Facility Tax ID [id number]    Provider Information:     Provider's Name Dr. Curtis Andersen   Provider's NPI: [id number]  Provider's Address   [facility]  [address] [address]    Patient Information:     Yuniorsage Clement  [address]   [place] [address]   [phone]   : 1953  AGE: 71 y.o.     GENDER: male   TODAYS DATE:  2024    Insurance:     PRIMARY INSURANCE:  Plan: studentSN-PPO MEDICARE  Coverage: [employer] MEDICARE  Effective Date: 2023  Group Number: [unfilled]  Subscriber Number: [id number] - (Medicare Managed)    Payer/Plan Subscr  Sex Relation Sub. Ins. ID Effective Group Num   1. [employer] MEDICA* YUNIOR CLEMENT 1953 Male Self [id number]                                   [address] [address]   Wound Care Documentation:  Wound 24 Toe (Comment  which one) Left;Plantar #1 (Active)   Wound Image   24 0911   Wound Etiology Venous 24 0926   Dressing Status Old drainage noted 24 0911   Wound Cleansed Cleansed with saline 24 0911   Dressing/Treatment Alginate with Ag;Gauze dressing/dressing sponge;ABD;Tape/Soft cloth adhesive tape;Roll gauze 24 0955   Offloading for Diabetic Foot Ulcers Offloading ordered 24 0955   Wound Length (cm) 1.9 cm 24 0911   Wound Width (cm) 1.2 cm 24 0911   Wound Depth (cm) 0.1 cm 24 0911   Wound Surface Area (cm^2) 2.28 cm^2 24 0911   Change in Wound Size % (l*w) 36.13 24 0911   Wound Volume (cm^3) 0.228 cm^3 24   Wound Healing % 68 24   Post-Procedure Length (cm) 1.9 cm 24   Post-Procedure Width (cm) 1.2 cm 24   Post-Procedure Depth (cm) 0.2 cm 24   Post-Procedure

## 2024-05-02 ENCOUNTER — HOSPITAL ENCOUNTER (OUTPATIENT)
Dept: INFUSION THERAPY | Age: 74
Discharge: HOME | End: 2024-05-02
Payer: MEDICARE

## 2024-05-02 DIAGNOSIS — D64.9 ANEMIA, UNSPECIFIED TYPE: ICD-10-CM

## 2024-05-02 DIAGNOSIS — D50.0 IRON DEFICIENCY ANEMIA SECONDARY TO BLOOD LOSS (CHRONIC): ICD-10-CM

## 2024-05-02 LAB
BASOPHILS ABSOLUTE: 0.1 K/CU MM
BASOPHILS RELATIVE PERCENT: 0.8 % (ref 0–1)
DIFFERENTIAL TYPE: ABNORMAL
EOSINOPHILS ABSOLUTE: 0.3 K/CU MM
EOSINOPHILS RELATIVE PERCENT: 4.4 % (ref 0–3)
FERRITIN: 18 NG/ML (ref 15–150)
FOLATE SERPL-MCNC: 5.7 NG/ML (ref 3.1–17.5)
HCT VFR BLD CALC: 35.7 % (ref 37–47)
HEMOGLOBIN: 11.2 GM/DL (ref 12.5–16)
IRON: 23 UG/DL (ref 37–145)
LYMPHOCYTES ABSOLUTE: 1.7 K/CU MM
LYMPHOCYTES RELATIVE PERCENT: 24 % (ref 24–44)
MCH RBC QN AUTO: 27 PG (ref 27–31)
MCHC RBC AUTO-ENTMCNC: 31.4 % (ref 32–36)
MCV RBC AUTO: 86 FL (ref 78–100)
MONOCYTES ABSOLUTE: 0.6 K/CU MM
MONOCYTES RELATIVE PERCENT: 8.9 % (ref 0–4)
NEUTROPHILS ABSOLUTE: 4.5 K/CU MM
NEUTROPHILS RELATIVE PERCENT: 61.9 % (ref 36–66)
PCT TRANSFERRIN: 7 % (ref 10–44)
PDW BLD-RTO: 14.2 % (ref 11.7–14.9)
PLATELET # BLD: 244 K/CU MM (ref 140–440)
PMV BLD AUTO: 12.6 FL (ref 7.5–11.1)
RBC # BLD: 4.15 M/CU MM (ref 4.2–5.4)
TOTAL IRON BINDING CAPACITY: 325 UG/DL (ref 250–450)
UNSATURATED IRON BINDING CAPACITY: 302 UG/DL (ref 110–370)
VITAMIN B-12: 360.7 PG/ML (ref 211–911)
WBC # BLD: 7.2 K/CU MM (ref 4–10.5)

## 2024-05-02 PROCEDURE — 83540 ASSAY OF IRON: CPT

## 2024-05-02 PROCEDURE — 36415 COLL VENOUS BLD VENIPUNCTURE: CPT

## 2024-05-02 PROCEDURE — 85025 COMPLETE CBC W/AUTO DIFF WBC: CPT

## 2024-05-02 PROCEDURE — 82728 ASSAY OF FERRITIN: CPT

## 2024-05-02 PROCEDURE — 82607 VITAMIN B-12: CPT

## 2024-05-02 PROCEDURE — 82746 ASSAY OF FOLIC ACID SERUM: CPT

## 2024-05-02 PROCEDURE — 83550 IRON BINDING TEST: CPT

## 2024-05-06 NOTE — PROGRESS NOTES
Patient Name: Sofia Bey  Patient : 1950  Patient MRN: [id number]     Primary Oncologist: Elise Xie MD  Referring Provider: Claritza Johnson MD     Date of Service: 2024      Chief Complaint:   Chief Complaint   Patient presents with    Follow-up     She came in for follow-up visit.     Problem List:      Iron deficiency anemia secondary to blood loss (chronic)     Vitamin B12 deficiency anemia     Personal history of other diseases of the digestive system     HPI:   Sofia Bey is a pleasant 74-year-old  female patient with an underlying history of anemia of iron deficiency, who was referred for consideration of iron infusion. She received parenteral iron in  without any problems. She was seen by Dr. Jeong previously and had colonoscopy and upper endoscopic study more than five years ago. She denied any melena or hematochezia. She received blood transfusion in 2017. At that time, hemoglobin was 7. Stool occult was several years back.   2017 CMP within normal limits. White cell count was 6.2, hemoglobin 7.2, hematocrit 25, MCV 59.2, platelet 300. Ferritin was 6, B12 266, TSH 2.28. She tried iron pills, which caused her to have irritation of the stomach and constipation.   2017 iron 21. BMP was within normal limits. White cell count was 5.4, hemoglobin 9.2, hematocrit 29.7, MCV 71.1, platelet 241, TSH 0.736. Ferritin was 9 and B12 was 258.   She had left cataract surgery on 2017.  She had right eye cataract surgeryIn 2018 and hematoma below the right eye. She received iron infusion in 2018 with mild improvement of hemoglobin and ferritin. Iron saturation remained low. She is on omeprazole for acid reflux. Blood tests in 2018 revealed hemoglobin 9.6 MCV 81.5 ferritin 38.  B12 was borderline therefore I started B12 injection SQ monthly. She could not tolerate iron by mouth.   In 2018 anemia was corrected. She had

## 2024-05-08 ENCOUNTER — OFFICE VISIT (OUTPATIENT)
Dept: ONCOLOGY | Age: 74
End: 2024-05-08
Payer: MEDICARE

## 2024-05-08 ENCOUNTER — HOSPITAL ENCOUNTER (OUTPATIENT)
Dept: INFUSION THERAPY | Age: 74
Discharge: HOME | End: 2024-05-08
Payer: MEDICARE

## 2024-05-08 VITALS
HEART RATE: 97 BPM | DIASTOLIC BLOOD PRESSURE: 75 MMHG | OXYGEN SATURATION: 97 % | SYSTOLIC BLOOD PRESSURE: 169 MMHG | BODY MASS INDEX: 34.86 KG/M2 | HEIGHT: 64 IN | WEIGHT: 204.2 LBS | TEMPERATURE: 97.4 F | RESPIRATION RATE: 18 BRPM

## 2024-05-08 DIAGNOSIS — D50.0 IRON DEFICIENCY ANEMIA SECONDARY TO BLOOD LOSS (CHRONIC): Primary | ICD-10-CM

## 2024-05-08 PROCEDURE — 99213 OFFICE O/P EST LOW 20 MIN: CPT | Performed by: INTERNAL MEDICINE

## 2024-05-08 PROCEDURE — 3017F COLORECTAL CA SCREEN DOC REV: CPT | Performed by: INTERNAL MEDICINE

## 2024-05-08 PROCEDURE — G8417 CALC BMI ABV UP PARAM F/U: HCPCS | Performed by: INTERNAL MEDICINE

## 2024-05-08 PROCEDURE — G8400 PT W/DXA NO RESULTS DOC: HCPCS | Performed by: INTERNAL MEDICINE

## 2024-05-08 PROCEDURE — 1090F PRES/ABSN URINE INCON ASSESS: CPT | Performed by: INTERNAL MEDICINE

## 2024-05-08 PROCEDURE — 1036F TOBACCO NON-USER: CPT | Performed by: INTERNAL MEDICINE

## 2024-05-08 PROCEDURE — 1123F ACP DISCUSS/DSCN MKR DOCD: CPT | Performed by: INTERNAL MEDICINE

## 2024-05-08 PROCEDURE — G8427 DOCREV CUR MEDS BY ELIG CLIN: HCPCS | Performed by: INTERNAL MEDICINE

## 2024-05-08 PROCEDURE — 99211 OFF/OP EST MAY X REQ PHY/QHP: CPT

## 2024-05-08 RX ORDER — SODIUM CHLORIDE 9 MG/ML
5-250 INJECTION, SOLUTION INTRAVENOUS PRN
OUTPATIENT
Start: 2024-05-15

## 2024-05-08 RX ORDER — SODIUM CHLORIDE 9 MG/ML
INJECTION, SOLUTION INTRAVENOUS CONTINUOUS
OUTPATIENT
Start: 2024-05-15

## 2024-05-08 RX ORDER — ACETAMINOPHEN 325 MG/1
650 TABLET ORAL
OUTPATIENT
Start: 2024-05-15

## 2024-05-08 RX ORDER — DULOXETIN HYDROCHLORIDE 60 MG/1
CAPSULE, DELAYED RELEASE ORAL
COMMUNITY
Start: 2024-05-01

## 2024-05-08 RX ORDER — ACETAMINOPHEN 325 MG/1
650 TABLET ORAL ONCE
OUTPATIENT
Start: 2024-05-15 | End: 2024-05-15

## 2024-05-08 RX ORDER — FAMOTIDINE 10 MG/ML
20 INJECTION, SOLUTION INTRAVENOUS
OUTPATIENT
Start: 2024-05-15

## 2024-05-08 RX ORDER — EPINEPHRINE 1 MG/ML
0.3 INJECTION, SOLUTION, CONCENTRATE INTRAVENOUS PRN
OUTPATIENT
Start: 2024-05-15

## 2024-05-08 RX ORDER — SODIUM CHLORIDE 0.9 % (FLUSH) 0.9 %
5-40 SYRINGE (ML) INJECTION PRN
OUTPATIENT
Start: 2024-05-15

## 2024-05-08 RX ORDER — ONDANSETRON 2 MG/ML
8 INJECTION INTRAMUSCULAR; INTRAVENOUS
OUTPATIENT
Start: 2024-05-15

## 2024-05-08 RX ORDER — MEPERIDINE HYDROCHLORIDE 50 MG/ML
12.5 INJECTION INTRAMUSCULAR; INTRAVENOUS; SUBCUTANEOUS PRN
OUTPATIENT
Start: 2024-05-15

## 2024-05-08 RX ORDER — DIPHENHYDRAMINE HYDROCHLORIDE 50 MG/ML
50 INJECTION INTRAMUSCULAR; INTRAVENOUS
OUTPATIENT
Start: 2024-05-15

## 2024-05-08 RX ORDER — ALBUTEROL SULFATE 90 UG/1
4 AEROSOL, METERED RESPIRATORY (INHALATION) PRN
OUTPATIENT
Start: 2024-05-15

## 2024-05-08 RX ORDER — HEPARIN SODIUM (PORCINE) LOCK FLUSH IV SOLN 100 UNIT/ML 100 UNIT/ML
500 SOLUTION INTRAVENOUS PRN
OUTPATIENT
Start: 2024-05-15

## 2024-05-08 ASSESSMENT — PATIENT HEALTH QUESTIONNAIRE - PHQ9
SUM OF ALL RESPONSES TO PHQ QUESTIONS 1-9: 2
2. FEELING DOWN, DEPRESSED OR HOPELESS: SEVERAL DAYS
SUM OF ALL RESPONSES TO PHQ QUESTIONS 1-9: 2
1. LITTLE INTEREST OR PLEASURE IN DOING THINGS: SEVERAL DAYS
SUM OF ALL RESPONSES TO PHQ9 QUESTIONS 1 & 2: 2

## 2024-05-09 ENCOUNTER — TELEPHONE (OUTPATIENT)
Dept: INFUSION THERAPY | Age: 74
End: 2024-05-09

## 2024-05-09 NOTE — TELEPHONE ENCOUNTER
Called patient to schedule iron infusion, patient is scheduled for appt. Pt not currently taking oral iron. Advised pt to notify us if they are unable to keep their scheduled appt time.

## 2024-05-14 ENCOUNTER — TELEPHONE (OUTPATIENT)
Dept: GASTROENTEROLOGY | Age: 74
End: 2024-05-14

## 2024-05-15 ENCOUNTER — HOSPITAL ENCOUNTER (OUTPATIENT)
Dept: INFUSION THERAPY | Age: 74
Discharge: HOME | End: 2024-05-15
Payer: MEDICARE

## 2024-05-15 VITALS
SYSTOLIC BLOOD PRESSURE: 159 MMHG | BODY MASS INDEX: 35.41 KG/M2 | WEIGHT: 207.4 LBS | HEIGHT: 64 IN | HEART RATE: 94 BPM | OXYGEN SATURATION: 96 % | RESPIRATION RATE: 15 BRPM | TEMPERATURE: 97.5 F | DIASTOLIC BLOOD PRESSURE: 70 MMHG

## 2024-05-15 DIAGNOSIS — Z87.19 PERSONAL HISTORY OF OTHER DISEASES OF THE DIGESTIVE SYSTEM: ICD-10-CM

## 2024-05-15 DIAGNOSIS — K90.9 INTESTINAL MALABSORPTION, UNSPECIFIED TYPE: ICD-10-CM

## 2024-05-15 DIAGNOSIS — D50.0 IRON DEFICIENCY ANEMIA SECONDARY TO BLOOD LOSS (CHRONIC): Primary | ICD-10-CM

## 2024-05-15 PROCEDURE — 6360000002 HC RX W HCPCS: Performed by: INTERNAL MEDICINE

## 2024-05-15 PROCEDURE — 96367 TX/PROPH/DG ADDL SEQ IV INF: CPT

## 2024-05-15 PROCEDURE — 6370000000 HC RX 637 (ALT 250 FOR IP): Performed by: INTERNAL MEDICINE

## 2024-05-15 PROCEDURE — 96365 THER/PROPH/DIAG IV INF INIT: CPT

## 2024-05-15 PROCEDURE — 96366 THER/PROPH/DIAG IV INF ADDON: CPT

## 2024-05-15 PROCEDURE — 2580000003 HC RX 258: Performed by: INTERNAL MEDICINE

## 2024-05-15 RX ORDER — SODIUM CHLORIDE 0.9 % (FLUSH) 0.9 %
5-40 SYRINGE (ML) INJECTION PRN
Status: DISCONTINUED | OUTPATIENT
Start: 2024-05-15 | End: 2024-05-16 | Stop reason: HOSPADM

## 2024-05-15 RX ORDER — FAMOTIDINE 10 MG/ML
20 INJECTION, SOLUTION INTRAVENOUS
Status: CANCELLED | OUTPATIENT
Start: 2024-05-15

## 2024-05-15 RX ORDER — ALBUTEROL SULFATE 90 UG/1
4 AEROSOL, METERED RESPIRATORY (INHALATION) PRN
Status: CANCELLED | OUTPATIENT
Start: 2024-05-15

## 2024-05-15 RX ORDER — SODIUM CHLORIDE 9 MG/ML
INJECTION, SOLUTION INTRAVENOUS CONTINUOUS
Status: CANCELLED | OUTPATIENT
Start: 2024-05-15

## 2024-05-15 RX ORDER — SODIUM CHLORIDE 9 MG/ML
5-250 INJECTION, SOLUTION INTRAVENOUS PRN
Status: DISCONTINUED | OUTPATIENT
Start: 2024-05-15 | End: 2024-05-16 | Stop reason: HOSPADM

## 2024-05-15 RX ORDER — HEPARIN 100 UNIT/ML
500 SYRINGE INTRAVENOUS PRN
Status: CANCELLED | OUTPATIENT
Start: 2024-05-15

## 2024-05-15 RX ORDER — EPINEPHRINE 1 MG/ML
0.3 INJECTION, SOLUTION, CONCENTRATE INTRAVENOUS PRN
Status: CANCELLED | OUTPATIENT
Start: 2024-05-15

## 2024-05-15 RX ORDER — SODIUM CHLORIDE 9 MG/ML
5-250 INJECTION, SOLUTION INTRAVENOUS PRN
Status: CANCELLED | OUTPATIENT
Start: 2024-05-15

## 2024-05-15 RX ORDER — SODIUM CHLORIDE 0.9 % (FLUSH) 0.9 %
5-40 SYRINGE (ML) INJECTION PRN
Status: CANCELLED | OUTPATIENT
Start: 2024-05-15

## 2024-05-15 RX ORDER — DEXAMETHASONE SODIUM PHOSPHATE 4 MG/ML
10 INJECTION, SOLUTION INTRA-ARTICULAR; INTRALESIONAL; INTRAMUSCULAR; INTRAVENOUS; SOFT TISSUE ONCE
Status: DISCONTINUED | OUTPATIENT
Start: 2024-05-15 | End: 2024-05-15 | Stop reason: SDUPTHER

## 2024-05-15 RX ORDER — DEXAMETHASONE SODIUM PHOSPHATE 4 MG/ML
10 INJECTION, SOLUTION INTRA-ARTICULAR; INTRALESIONAL; INTRAMUSCULAR; INTRAVENOUS; SOFT TISSUE ONCE
Status: CANCELLED | OUTPATIENT
Start: 2024-05-15 | End: 2024-05-15

## 2024-05-15 RX ORDER — ACETAMINOPHEN 325 MG/1
650 TABLET ORAL
Status: CANCELLED | OUTPATIENT
Start: 2024-05-15

## 2024-05-15 RX ORDER — ACETAMINOPHEN 325 MG/1
650 TABLET ORAL ONCE
Status: CANCELLED | OUTPATIENT
Start: 2024-05-15 | End: 2024-05-15

## 2024-05-15 RX ORDER — MEPERIDINE HYDROCHLORIDE 25 MG/ML
12.5 INJECTION INTRAMUSCULAR; INTRAVENOUS; SUBCUTANEOUS PRN
Status: CANCELLED | OUTPATIENT
Start: 2024-05-15

## 2024-05-15 RX ORDER — DIPHENHYDRAMINE HYDROCHLORIDE 50 MG/ML
50 INJECTION INTRAMUSCULAR; INTRAVENOUS
Status: CANCELLED | OUTPATIENT
Start: 2024-05-15

## 2024-05-15 RX ORDER — ACETAMINOPHEN 325 MG/1
650 TABLET ORAL ONCE
Status: COMPLETED | OUTPATIENT
Start: 2024-05-15 | End: 2024-05-15

## 2024-05-15 RX ORDER — ONDANSETRON 2 MG/ML
8 INJECTION INTRAMUSCULAR; INTRAVENOUS
Status: CANCELLED | OUTPATIENT
Start: 2024-05-15

## 2024-05-15 RX ADMIN — SODIUM CHLORIDE 20 ML/HR: 9 INJECTION, SOLUTION INTRAVENOUS at 10:48

## 2024-05-15 RX ADMIN — DEXAMETHASONE SODIUM PHOSPHATE 10 MG: 10 INJECTION INTRAMUSCULAR; INTRAVENOUS at 10:48

## 2024-05-15 RX ADMIN — ACETAMINOPHEN 650 MG: 325 TABLET ORAL at 10:44

## 2024-05-15 RX ADMIN — SODIUM CHLORIDE 1000 MG: 9 INJECTION, SOLUTION INTRAVENOUS at 11:11

## 2024-05-15 ASSESSMENT — PAIN SCALES - GENERAL
PAINLEVEL_OUTOF10: 0
PAINLEVEL_OUTOF10: 5

## 2024-05-15 ASSESSMENT — PAIN DESCRIPTION - LOCATION: LOCATION: BACK

## 2024-05-15 ASSESSMENT — PAIN DESCRIPTION - DESCRIPTORS: DESCRIPTORS: ACHING

## 2024-05-15 ASSESSMENT — PAIN DESCRIPTION - ORIENTATION: ORIENTATION: LOWER

## 2024-05-15 NOTE — PROGRESS NOTES
Arrived to treatment suite for scheduled Infed infusion. Pt has received in the past without issue.  PIV placed without difficulty.  Positive blood return noted. Treatment plan approved, released and completed as ordered.  No test dose ordered, pt kept for 30 minute observation following infusion.  Pt tolerated without difficulty.  PIV removed per protocol.  AVS provided.  Discharge ambulatory in stable condition.  Maura Austin RN

## 2024-06-16 NOTE — PROGRESS NOTES
Patient Name: Sofia Bey  Patient : 1950  Patient MRN: [id number]     Primary Oncologist: Elise Xie MD  Referring Provider: Claritza Johnson MD     Date of Service: 2024      Chief Complaint:   No chief complaint on file.    She came in for follow-up visit.     Problem List:      Iron deficiency anemia secondary to blood loss (chronic)     Vitamin B12 deficiency anemia     Personal history of other diseases of the digestive system     HPI:   Sofia Bey is a pleasant 74-year-old  female patient with an underlying history of anemia of iron deficiency, who was referred for consideration of iron infusion. She received parenteral iron in  without any problems. She was seen by Dr. Jeong previously and had colonoscopy and upper endoscopic study more than five years ago. She denied any melena or hematochezia. She received blood transfusion in 2017. At that time, hemoglobin was 7. Stool occult was several years back.   2017 CMP within normal limits. White cell count was 6.2, hemoglobin 7.2, hematocrit 25, MCV 59.2, platelet 300. Ferritin was 6, B12 266, TSH 2.28. She tried iron pills, which caused her to have irritation of the stomach and constipation.   2017 iron 21. BMP was within normal limits. White cell count was 5.4, hemoglobin 9.2, hematocrit 29.7, MCV 71.1, platelet 241, TSH 0.736. Ferritin was 9 and B12 was 258.   She had left cataract surgery on 2017.  She had right eye cataract surgeryIn 2018 and hematoma below the right eye. She received iron infusion in 2018 with mild improvement of hemoglobin and ferritin. Iron saturation remained low. She is on omeprazole for acid reflux. Blood tests in 2018 revealed hemoglobin 9.6 MCV 81.5 ferritin 38.  B12 was borderline therefore I started B12 injection SQ monthly. She could not tolerate iron by mouth.   2018 anemia was corrected. She had upper, lower endoscopic

## 2024-07-08 ENCOUNTER — HOSPITAL ENCOUNTER (OUTPATIENT)
Dept: INFUSION THERAPY | Age: 74
Discharge: HOME | End: 2024-07-08
Payer: MEDICARE

## 2024-07-08 DIAGNOSIS — D50.0 IRON DEFICIENCY ANEMIA SECONDARY TO BLOOD LOSS (CHRONIC): ICD-10-CM

## 2024-07-08 LAB
BASOPHILS ABSOLUTE: 0 K/CU MM
BASOPHILS RELATIVE PERCENT: 0.6 % (ref 0–1)
DIFFERENTIAL TYPE: ABNORMAL
EOSINOPHILS ABSOLUTE: 0.2 K/CU MM
EOSINOPHILS RELATIVE PERCENT: 3.5 % (ref 0–3)
FERRITIN: 79 NG/ML (ref 15–150)
FOLATE SERPL-MCNC: 5.3 NG/ML (ref 3.1–17.5)
HCT VFR BLD CALC: 40.7 % (ref 37–47)
HEMOGLOBIN: 13.1 GM/DL (ref 12.5–16)
IRON: 67 UG/DL (ref 37–145)
LYMPHOCYTES ABSOLUTE: 1 K/CU MM
LYMPHOCYTES RELATIVE PERCENT: 18.5 % (ref 24–44)
MCH RBC QN AUTO: 28.9 PG (ref 27–31)
MCHC RBC AUTO-ENTMCNC: 32.2 % (ref 32–36)
MCV RBC AUTO: 89.6 FL (ref 78–100)
MONOCYTES ABSOLUTE: 0.4 K/CU MM
MONOCYTES RELATIVE PERCENT: 8.1 % (ref 0–4)
NEUTROPHILS ABSOLUTE: 3.8 K/CU MM
NEUTROPHILS RELATIVE PERCENT: 69.3 % (ref 36–66)
PCT TRANSFERRIN: 27 % (ref 10–44)
PDW BLD-RTO: 16.4 % (ref 11.7–14.9)
PLATELET # BLD: 216 K/CU MM (ref 140–440)
PMV BLD AUTO: 11.7 FL (ref 7.5–11.1)
RBC # BLD: 4.54 M/CU MM (ref 4.2–5.4)
TOTAL IRON BINDING CAPACITY: 245 UG/DL (ref 250–450)
UNSATURATED IRON BINDING CAPACITY: 178 UG/DL (ref 110–370)
VITAMIN B-12: 370.6 PG/ML (ref 211–911)
WBC # BLD: 5.4 K/CU MM (ref 4–10.5)

## 2024-07-08 PROCEDURE — 83550 IRON BINDING TEST: CPT

## 2024-07-08 PROCEDURE — 83540 ASSAY OF IRON: CPT

## 2024-07-08 PROCEDURE — 82728 ASSAY OF FERRITIN: CPT

## 2024-07-08 PROCEDURE — 82607 VITAMIN B-12: CPT

## 2024-07-08 PROCEDURE — 82746 ASSAY OF FOLIC ACID SERUM: CPT

## 2024-07-08 PROCEDURE — 36415 COLL VENOUS BLD VENIPUNCTURE: CPT

## 2024-07-08 PROCEDURE — 85025 COMPLETE CBC W/AUTO DIFF WBC: CPT

## 2024-07-15 ENCOUNTER — HOSPITAL ENCOUNTER (OUTPATIENT)
Dept: INFUSION THERAPY | Age: 74
Discharge: HOME | End: 2024-07-15
Payer: MEDICARE

## 2024-07-15 ENCOUNTER — OFFICE VISIT (OUTPATIENT)
Dept: ONCOLOGY | Age: 74
End: 2024-07-15
Payer: MEDICARE

## 2024-07-15 VITALS
TEMPERATURE: 97.7 F | DIASTOLIC BLOOD PRESSURE: 70 MMHG | HEART RATE: 97 BPM | WEIGHT: 203.4 LBS | HEIGHT: 64 IN | RESPIRATION RATE: 16 BRPM | OXYGEN SATURATION: 96 % | SYSTOLIC BLOOD PRESSURE: 164 MMHG | BODY MASS INDEX: 34.72 KG/M2

## 2024-07-15 DIAGNOSIS — D50.0 IRON DEFICIENCY ANEMIA SECONDARY TO BLOOD LOSS (CHRONIC): Primary | ICD-10-CM

## 2024-07-15 PROCEDURE — 1036F TOBACCO NON-USER: CPT | Performed by: INTERNAL MEDICINE

## 2024-07-15 PROCEDURE — G8427 DOCREV CUR MEDS BY ELIG CLIN: HCPCS | Performed by: INTERNAL MEDICINE

## 2024-07-15 PROCEDURE — 3017F COLORECTAL CA SCREEN DOC REV: CPT | Performed by: INTERNAL MEDICINE

## 2024-07-15 PROCEDURE — 99213 OFFICE O/P EST LOW 20 MIN: CPT | Performed by: INTERNAL MEDICINE

## 2024-07-15 PROCEDURE — G8417 CALC BMI ABV UP PARAM F/U: HCPCS | Performed by: INTERNAL MEDICINE

## 2024-07-15 PROCEDURE — 99211 OFF/OP EST MAY X REQ PHY/QHP: CPT

## 2024-07-15 PROCEDURE — G8400 PT W/DXA NO RESULTS DOC: HCPCS | Performed by: INTERNAL MEDICINE

## 2024-07-15 PROCEDURE — 1090F PRES/ABSN URINE INCON ASSESS: CPT | Performed by: INTERNAL MEDICINE

## 2024-07-15 PROCEDURE — 1123F ACP DISCUSS/DSCN MKR DOCD: CPT | Performed by: INTERNAL MEDICINE

## 2024-07-15 RX ORDER — TRAMADOL HYDROCHLORIDE 50 MG/1
50 TABLET ORAL EVERY 6 HOURS PRN
COMMUNITY

## 2024-07-15 NOTE — PROGRESS NOTES
MA Rooming Questions  Patient: Sofia Bey  MRN: [id number]    Date: 7/15/2024        1. Do you have any new issues?   yes - on going tiredness x 5 mths         2. Do you need any refills on medications?    no    3. Have you had any imaging done since your last visit?   no    4. Have you been hospitalized or seen in the emergency room since your last visit here?   no    5. Did the patient have a depression screening completed today? No    No data recorded     PHQ-9 Given to (if applicable):               PHQ-9 Score (if applicable):                     [] Positive     []  Negative              Does question #9 need addressed (if applicable)                     [] Yes    []  No               Jena Hampton MA

## 2024-09-16 ENCOUNTER — HOSPITAL ENCOUNTER (OUTPATIENT)
Dept: INFUSION THERAPY | Age: 74
Discharge: HOME | End: 2024-09-16
Payer: MEDICARE

## 2024-09-16 ENCOUNTER — OFFICE VISIT (OUTPATIENT)
Dept: ONCOLOGY | Age: 74
End: 2024-09-16
Payer: MEDICARE

## 2024-09-16 VITALS
WEIGHT: 202.4 LBS | SYSTOLIC BLOOD PRESSURE: 163 MMHG | RESPIRATION RATE: 16 BRPM | OXYGEN SATURATION: 97 % | BODY MASS INDEX: 34.56 KG/M2 | TEMPERATURE: 97.9 F | HEIGHT: 64 IN | DIASTOLIC BLOOD PRESSURE: 90 MMHG | HEART RATE: 95 BPM

## 2024-09-16 DIAGNOSIS — D64.9 ANEMIA, UNSPECIFIED TYPE: ICD-10-CM

## 2024-09-16 DIAGNOSIS — D50.0 IRON DEFICIENCY ANEMIA SECONDARY TO BLOOD LOSS (CHRONIC): ICD-10-CM

## 2024-09-16 DIAGNOSIS — D50.0 IRON DEFICIENCY ANEMIA SECONDARY TO BLOOD LOSS (CHRONIC): Primary | ICD-10-CM

## 2024-09-16 LAB
BASOPHILS # BLD: 0.03 K/UL
BASOPHILS NFR BLD: 0 % (ref 0–1)
EOSINOPHIL # BLD: 0.27 K/UL
EOSINOPHILS RELATIVE PERCENT: 4 % (ref 0–3)
ERYTHROCYTE [DISTWIDTH] IN BLOOD BY AUTOMATED COUNT: 13.8 % (ref 11.7–14.9)
FERRITIN SERPL-MCNC: 31 NG/ML (ref 15–150)
HCT VFR BLD AUTO: 42.4 % (ref 37–47)
HGB BLD-MCNC: 13.6 G/DL (ref 12.5–16)
IRON SATN MFR SERPL: 16 % (ref 15–50)
IRON SERPL-MCNC: 49 UG/DL (ref 37–145)
LYMPHOCYTES NFR BLD: 1.32 K/UL
LYMPHOCYTES RELATIVE PERCENT: 20 % (ref 24–44)
MCH RBC QN AUTO: 28.9 PG (ref 27–31)
MCHC RBC AUTO-ENTMCNC: 32.1 G/DL (ref 32–36)
MCV RBC AUTO: 90.2 FL (ref 78–100)
MONOCYTES NFR BLD: 0.57 K/UL
MONOCYTES NFR BLD: 9 % (ref 0–4)
NEUTROPHILS NFR BLD: 68 % (ref 36–66)
NEUTS SEG NFR BLD: 4.54 K/UL
PLATELET # BLD AUTO: 246 K/UL (ref 140–440)
PMV BLD AUTO: 10.7 FL (ref 7.5–11.1)
RBC # BLD AUTO: 4.7 M/UL (ref 4.2–5.4)
TIBC SERPL-MCNC: 312 UG/DL (ref 260–445)
UNSATURATED IRON BINDING CAPACITY: 263 UG/DL (ref 110–370)
WBC OTHER # BLD: 6.7 K/UL (ref 4–10.5)

## 2024-09-16 PROCEDURE — 83550 IRON BINDING TEST: CPT

## 2024-09-16 PROCEDURE — 82728 ASSAY OF FERRITIN: CPT

## 2024-09-16 PROCEDURE — 99211 OFF/OP EST MAY X REQ PHY/QHP: CPT

## 2024-09-16 PROCEDURE — 1036F TOBACCO NON-USER: CPT | Performed by: INTERNAL MEDICINE

## 2024-09-16 PROCEDURE — G8427 DOCREV CUR MEDS BY ELIG CLIN: HCPCS | Performed by: INTERNAL MEDICINE

## 2024-09-16 PROCEDURE — 36415 COLL VENOUS BLD VENIPUNCTURE: CPT

## 2024-09-16 PROCEDURE — 1090F PRES/ABSN URINE INCON ASSESS: CPT | Performed by: INTERNAL MEDICINE

## 2024-09-16 PROCEDURE — 85025 COMPLETE CBC W/AUTO DIFF WBC: CPT

## 2024-09-16 PROCEDURE — 83540 ASSAY OF IRON: CPT

## 2024-09-16 PROCEDURE — 99213 OFFICE O/P EST LOW 20 MIN: CPT | Performed by: INTERNAL MEDICINE

## 2024-09-16 PROCEDURE — G8400 PT W/DXA NO RESULTS DOC: HCPCS | Performed by: INTERNAL MEDICINE

## 2024-09-16 PROCEDURE — 1123F ACP DISCUSS/DSCN MKR DOCD: CPT | Performed by: INTERNAL MEDICINE

## 2024-09-16 PROCEDURE — 3017F COLORECTAL CA SCREEN DOC REV: CPT | Performed by: INTERNAL MEDICINE

## 2024-09-16 PROCEDURE — G8417 CALC BMI ABV UP PARAM F/U: HCPCS | Performed by: INTERNAL MEDICINE

## 2024-09-16 RX ORDER — SIMVASTATIN 40 MG
TABLET ORAL
COMMUNITY
Start: 2024-08-01

## 2024-09-16 RX ORDER — LEVOTHYROXINE SODIUM 112 UG/1
TABLET ORAL
COMMUNITY
Start: 2024-08-02

## 2024-09-26 ENCOUNTER — TELEPHONE (OUTPATIENT)
Dept: ONCOLOGY | Age: 74
End: 2024-09-26

## 2024-09-27 DIAGNOSIS — K90.9 INTESTINAL MALABSORPTION, UNSPECIFIED TYPE: ICD-10-CM

## 2024-09-27 DIAGNOSIS — D50.0 IRON DEFICIENCY ANEMIA SECONDARY TO BLOOD LOSS (CHRONIC): Primary | ICD-10-CM

## 2024-09-27 RX ORDER — SODIUM CHLORIDE 9 MG/ML
INJECTION, SOLUTION INTRAVENOUS CONTINUOUS
OUTPATIENT
Start: 2024-09-27

## 2024-09-27 RX ORDER — SODIUM CHLORIDE 9 MG/ML
5-250 INJECTION, SOLUTION INTRAVENOUS PRN
OUTPATIENT
Start: 2024-09-27

## 2024-09-27 RX ORDER — ACETAMINOPHEN 325 MG/1
650 TABLET ORAL
OUTPATIENT
Start: 2024-09-27

## 2024-09-27 RX ORDER — MEPERIDINE HYDROCHLORIDE 50 MG/ML
12.5 INJECTION INTRAMUSCULAR; INTRAVENOUS; SUBCUTANEOUS PRN
OUTPATIENT
Start: 2024-09-27

## 2024-09-27 RX ORDER — ALBUTEROL SULFATE 90 UG/1
4 INHALANT RESPIRATORY (INHALATION) PRN
OUTPATIENT
Start: 2024-09-27

## 2024-09-27 RX ORDER — EPINEPHRINE 1 MG/ML
0.3 INJECTION, SOLUTION, CONCENTRATE INTRAVENOUS PRN
OUTPATIENT
Start: 2024-09-27

## 2024-09-27 RX ORDER — ONDANSETRON 2 MG/ML
8 INJECTION INTRAMUSCULAR; INTRAVENOUS
OUTPATIENT
Start: 2024-09-27

## 2024-09-27 RX ORDER — DIPHENHYDRAMINE HYDROCHLORIDE 50 MG/ML
50 INJECTION INTRAMUSCULAR; INTRAVENOUS
OUTPATIENT
Start: 2024-09-27

## 2024-09-27 RX ORDER — HEPARIN SODIUM (PORCINE) LOCK FLUSH IV SOLN 100 UNIT/ML 100 UNIT/ML
500 SOLUTION INTRAVENOUS PRN
OUTPATIENT
Start: 2024-09-27

## 2024-09-27 RX ORDER — ACETAMINOPHEN 325 MG/1
650 TABLET ORAL ONCE
OUTPATIENT
Start: 2024-09-27 | End: 2024-09-27

## 2024-09-27 RX ORDER — SODIUM CHLORIDE 0.9 % (FLUSH) 0.9 %
5-40 SYRINGE (ML) INJECTION PRN
OUTPATIENT
Start: 2024-09-27

## 2024-09-27 RX ORDER — FAMOTIDINE 10 MG/ML
20 INJECTION, SOLUTION INTRAVENOUS
OUTPATIENT
Start: 2024-09-27

## 2024-10-01 ENCOUNTER — TELEPHONE (OUTPATIENT)
Dept: INFUSION THERAPY | Age: 74
End: 2024-10-01

## 2024-10-07 ENCOUNTER — HOSPITAL ENCOUNTER (OUTPATIENT)
Dept: INFUSION THERAPY | Age: 74
Discharge: HOME | End: 2024-10-07
Payer: MEDICARE

## 2024-10-07 VITALS
HEIGHT: 64 IN | HEART RATE: 105 BPM | BODY MASS INDEX: 35.07 KG/M2 | TEMPERATURE: 97.4 F | WEIGHT: 205.4 LBS | SYSTOLIC BLOOD PRESSURE: 156 MMHG | OXYGEN SATURATION: 94 % | DIASTOLIC BLOOD PRESSURE: 72 MMHG

## 2024-10-07 DIAGNOSIS — K90.9 INTESTINAL MALABSORPTION, UNSPECIFIED TYPE: ICD-10-CM

## 2024-10-07 DIAGNOSIS — D50.0 IRON DEFICIENCY ANEMIA SECONDARY TO BLOOD LOSS (CHRONIC): Primary | ICD-10-CM

## 2024-10-07 PROCEDURE — 96366 THER/PROPH/DIAG IV INF ADDON: CPT

## 2024-10-07 PROCEDURE — 6370000000 HC RX 637 (ALT 250 FOR IP): Performed by: INTERNAL MEDICINE

## 2024-10-07 PROCEDURE — 96367 TX/PROPH/DG ADDL SEQ IV INF: CPT

## 2024-10-07 PROCEDURE — 6360000002 HC RX W HCPCS: Performed by: INTERNAL MEDICINE

## 2024-10-07 PROCEDURE — 96365 THER/PROPH/DIAG IV INF INIT: CPT

## 2024-10-07 PROCEDURE — 2580000003 HC RX 258: Performed by: INTERNAL MEDICINE

## 2024-10-07 RX ORDER — ONDANSETRON 2 MG/ML
8 INJECTION INTRAMUSCULAR; INTRAVENOUS
Status: CANCELLED | OUTPATIENT
Start: 2024-10-13

## 2024-10-07 RX ORDER — ACETAMINOPHEN 325 MG/1
650 TABLET ORAL ONCE
Status: COMPLETED | OUTPATIENT
Start: 2024-10-07 | End: 2024-10-07

## 2024-10-07 RX ORDER — ACETAMINOPHEN 325 MG/1
650 TABLET ORAL ONCE
Status: CANCELLED | OUTPATIENT
Start: 2024-10-13 | End: 2024-10-13

## 2024-10-07 RX ORDER — DIPHENHYDRAMINE HYDROCHLORIDE 50 MG/ML
50 INJECTION INTRAMUSCULAR; INTRAVENOUS
Status: CANCELLED | OUTPATIENT
Start: 2024-10-13

## 2024-10-07 RX ORDER — EPINEPHRINE 1 MG/ML
0.3 INJECTION, SOLUTION, CONCENTRATE INTRAVENOUS PRN
Status: CANCELLED | OUTPATIENT
Start: 2024-10-13

## 2024-10-07 RX ORDER — SODIUM CHLORIDE 9 MG/ML
5-250 INJECTION, SOLUTION INTRAVENOUS PRN
Status: DISCONTINUED | OUTPATIENT
Start: 2024-10-07 | End: 2024-10-08 | Stop reason: HOSPADM

## 2024-10-07 RX ORDER — SODIUM CHLORIDE 9 MG/ML
5-250 INJECTION, SOLUTION INTRAVENOUS PRN
Status: CANCELLED | OUTPATIENT
Start: 2024-10-13

## 2024-10-07 RX ORDER — SODIUM CHLORIDE 0.9 % (FLUSH) 0.9 %
5-40 SYRINGE (ML) INJECTION PRN
Status: CANCELLED | OUTPATIENT
Start: 2024-10-13

## 2024-10-07 RX ORDER — FAMOTIDINE 10 MG/ML
20 INJECTION, SOLUTION INTRAVENOUS
Status: CANCELLED | OUTPATIENT
Start: 2024-10-13

## 2024-10-07 RX ORDER — MEPERIDINE HYDROCHLORIDE 25 MG/ML
12.5 INJECTION INTRAMUSCULAR; INTRAVENOUS; SUBCUTANEOUS PRN
Status: CANCELLED | OUTPATIENT
Start: 2024-10-13

## 2024-10-07 RX ORDER — HEPARIN 100 UNIT/ML
500 SYRINGE INTRAVENOUS PRN
Status: CANCELLED | OUTPATIENT
Start: 2024-10-13

## 2024-10-07 RX ORDER — ACETAMINOPHEN 325 MG/1
650 TABLET ORAL
Status: CANCELLED | OUTPATIENT
Start: 2024-10-13

## 2024-10-07 RX ORDER — SODIUM CHLORIDE 9 MG/ML
INJECTION, SOLUTION INTRAVENOUS CONTINUOUS
Status: CANCELLED | OUTPATIENT
Start: 2024-10-13

## 2024-10-07 RX ORDER — ALBUTEROL SULFATE 90 UG/1
4 INHALANT RESPIRATORY (INHALATION) PRN
Status: CANCELLED | OUTPATIENT
Start: 2024-10-13

## 2024-10-07 RX ADMIN — DEXAMETHASONE SODIUM PHOSPHATE 10 MG: 10 INJECTION INTRAMUSCULAR; INTRAVENOUS at 10:04

## 2024-10-07 RX ADMIN — ACETAMINOPHEN 650 MG: 325 TABLET ORAL at 09:57

## 2024-10-07 RX ADMIN — SODIUM CHLORIDE 500 MG: 9 INJECTION, SOLUTION INTRAVENOUS at 10:20

## 2024-10-07 ASSESSMENT — PAIN SCALES - GENERAL: PAINLEVEL_OUTOF10: 5

## 2024-10-07 ASSESSMENT — PAIN DESCRIPTION - LOCATION: LOCATION: BACK

## 2024-10-07 NOTE — PROGRESS NOTES
Pt here for Infed infusion. PIV  placed with blood return noted. Pt has no concerns or issues to discuss at this time.     Infed infusion completed pt observed for 30 minutes after and tolerated without incident. Left ambulatory

## 2024-11-18 NOTE — PROGRESS NOTES
Patient Name: Sofia Bey  Patient : 1950  Patient MRN: [id number]     Primary Oncologist: Elise Xie MD  Referring Provider: Claritza Johnson MD     Date of Service: 2024      Chief Complaint:   Chief Complaint   Patient presents with    Follow-up     She came in for follow-up visit.     Problem List:      Iron deficiency anemia secondary to blood loss (chronic)     Vitamin B12 deficiency anemia     Personal history of other diseases of the digestive system     HPI:   Sofia Bey is a pleasant 74-year-old  female patient with an underlying history of anemia of iron deficiency, who was referred for consideration of iron infusion. She received parenteral iron in  without any problems. She was seen by Dr. Jeong previously and had colonoscopy and upper endoscopic study more than five years ago. She denied any melena or hematochezia. She received blood transfusion in 2017. At that time, hemoglobin was 7. Stool occult was several years back.   2017 CMP within normal limits. White cell count was 6.2, hemoglobin 7.2, hematocrit 25, MCV 59.2, platelet 300. Ferritin was 6, B12 266, TSH 2.28. She tried iron pills, which caused her to have irritation of the stomach and constipation.   2017 iron 21. BMP was within normal limits. White cell count was 5.4, hemoglobin 9.2, hematocrit 29.7, MCV 71.1, platelet 241, TSH 0.736. Ferritin was 9 and B12 was 258.   She had left cataract surgery on 2017.  She had right eye cataract surgeryIn 2018 and hematoma below the right eye. She received iron infusion in 2018 with mild improvement of hemoglobin and ferritin. Iron saturation remained low. She is on omeprazole for acid reflux. Blood tests in 2018 revealed hemoglobin 9.6 MCV 81.5 ferritin 38.  B12 was borderline therefore I started B12 injection SQ monthly. She could not tolerate iron by mouth.   2018 anemia was corrected. She had

## 2024-12-09 ENCOUNTER — HOSPITAL ENCOUNTER (OUTPATIENT)
Dept: INFUSION THERAPY | Age: 74
Discharge: HOME | End: 2024-12-09
Payer: MEDICARE

## 2024-12-09 DIAGNOSIS — D50.0 IRON DEFICIENCY ANEMIA SECONDARY TO BLOOD LOSS (CHRONIC): ICD-10-CM

## 2024-12-09 DIAGNOSIS — D64.9 ANEMIA, UNSPECIFIED TYPE: ICD-10-CM

## 2024-12-09 LAB
BASOPHILS # BLD: 0.03 K/UL
BASOPHILS NFR BLD: 1 % (ref 0–1)
EOSINOPHIL # BLD: 0.23 K/UL
EOSINOPHILS RELATIVE PERCENT: 4 % (ref 0–3)
ERYTHROCYTE [DISTWIDTH] IN BLOOD BY AUTOMATED COUNT: 13.4 % (ref 11.7–14.9)
FERRITIN SERPL-MCNC: 21 NG/ML (ref 15–150)
FOLATE SERPL-MCNC: 7.4 NG/ML (ref 4.8–24.2)
HCT VFR BLD AUTO: 36.9 % (ref 37–47)
HGB BLD-MCNC: 12.3 G/DL (ref 12.5–16)
IRON SATN MFR SERPL: 9 % (ref 15–50)
IRON SERPL-MCNC: 29 UG/DL (ref 37–145)
LYMPHOCYTES NFR BLD: 1.45 K/UL
LYMPHOCYTES RELATIVE PERCENT: 22 % (ref 24–44)
MCH RBC QN AUTO: 29.5 PG (ref 27–31)
MCHC RBC AUTO-ENTMCNC: 33.3 G/DL (ref 32–36)
MCV RBC AUTO: 88.5 FL (ref 78–100)
MONOCYTES NFR BLD: 0.53 K/UL
MONOCYTES NFR BLD: 8 % (ref 0–4)
NEUTROPHILS NFR BLD: 66 % (ref 36–66)
NEUTS SEG NFR BLD: 4.25 K/UL
PLATELET # BLD AUTO: 258 K/UL (ref 140–440)
PMV BLD AUTO: 11.3 FL (ref 7.5–11.1)
RBC # BLD AUTO: 4.17 M/UL (ref 4.2–5.4)
TIBC SERPL-MCNC: 305 UG/DL (ref 260–445)
UNSATURATED IRON BINDING CAPACITY: 276 UG/DL (ref 110–370)
VIT B12 SERPL-MCNC: 363 PG/ML (ref 211–911)
WBC OTHER # BLD: 6.5 K/UL (ref 4–10.5)

## 2024-12-09 PROCEDURE — 85025 COMPLETE CBC W/AUTO DIFF WBC: CPT

## 2024-12-09 PROCEDURE — 82728 ASSAY OF FERRITIN: CPT

## 2024-12-09 PROCEDURE — 83540 ASSAY OF IRON: CPT

## 2024-12-09 PROCEDURE — 82746 ASSAY OF FOLIC ACID SERUM: CPT

## 2024-12-09 PROCEDURE — 36415 COLL VENOUS BLD VENIPUNCTURE: CPT

## 2024-12-09 PROCEDURE — 82607 VITAMIN B-12: CPT

## 2024-12-09 PROCEDURE — 83550 IRON BINDING TEST: CPT

## 2024-12-16 ENCOUNTER — OFFICE VISIT (OUTPATIENT)
Dept: ONCOLOGY | Age: 74
End: 2024-12-16
Payer: MEDICARE

## 2024-12-16 ENCOUNTER — HOSPITAL ENCOUNTER (OUTPATIENT)
Dept: INFUSION THERAPY | Age: 74
Discharge: HOME | End: 2024-12-16
Payer: MEDICARE

## 2024-12-16 VITALS
OXYGEN SATURATION: 97 % | HEIGHT: 64 IN | BODY MASS INDEX: 35 KG/M2 | TEMPERATURE: 97.2 F | WEIGHT: 205 LBS | DIASTOLIC BLOOD PRESSURE: 79 MMHG | HEART RATE: 101 BPM | SYSTOLIC BLOOD PRESSURE: 112 MMHG | RESPIRATION RATE: 18 BRPM

## 2024-12-16 DIAGNOSIS — D50.0 IRON DEFICIENCY ANEMIA SECONDARY TO BLOOD LOSS (CHRONIC): Primary | ICD-10-CM

## 2024-12-16 PROCEDURE — G8482 FLU IMMUNIZE ORDER/ADMIN: HCPCS | Performed by: INTERNAL MEDICINE

## 2024-12-16 PROCEDURE — 1090F PRES/ABSN URINE INCON ASSESS: CPT | Performed by: INTERNAL MEDICINE

## 2024-12-16 PROCEDURE — G8400 PT W/DXA NO RESULTS DOC: HCPCS | Performed by: INTERNAL MEDICINE

## 2024-12-16 PROCEDURE — 1123F ACP DISCUSS/DSCN MKR DOCD: CPT | Performed by: INTERNAL MEDICINE

## 2024-12-16 PROCEDURE — 1125F AMNT PAIN NOTED PAIN PRSNT: CPT | Performed by: INTERNAL MEDICINE

## 2024-12-16 PROCEDURE — 3017F COLORECTAL CA SCREEN DOC REV: CPT | Performed by: INTERNAL MEDICINE

## 2024-12-16 PROCEDURE — 99213 OFFICE O/P EST LOW 20 MIN: CPT | Performed by: INTERNAL MEDICINE

## 2024-12-16 PROCEDURE — 1036F TOBACCO NON-USER: CPT | Performed by: INTERNAL MEDICINE

## 2024-12-16 PROCEDURE — 99211 OFF/OP EST MAY X REQ PHY/QHP: CPT

## 2024-12-16 PROCEDURE — G8417 CALC BMI ABV UP PARAM F/U: HCPCS | Performed by: INTERNAL MEDICINE

## 2024-12-16 PROCEDURE — 1159F MED LIST DOCD IN RCRD: CPT | Performed by: INTERNAL MEDICINE

## 2024-12-16 PROCEDURE — G8427 DOCREV CUR MEDS BY ELIG CLIN: HCPCS | Performed by: INTERNAL MEDICINE

## 2024-12-16 RX ORDER — SODIUM CHLORIDE 9 MG/ML
5-250 INJECTION, SOLUTION INTRAVENOUS PRN
OUTPATIENT
Start: 2024-12-23

## 2024-12-16 RX ORDER — MEPERIDINE HYDROCHLORIDE 50 MG/ML
12.5 INJECTION INTRAMUSCULAR; INTRAVENOUS; SUBCUTANEOUS PRN
OUTPATIENT
Start: 2024-12-23

## 2024-12-16 RX ORDER — EPINEPHRINE 1 MG/ML
0.3 INJECTION, SOLUTION, CONCENTRATE INTRAVENOUS PRN
OUTPATIENT
Start: 2024-12-23

## 2024-12-16 RX ORDER — ALBUTEROL SULFATE 90 UG/1
4 INHALANT RESPIRATORY (INHALATION) PRN
OUTPATIENT
Start: 2024-12-23

## 2024-12-16 RX ORDER — ACETAMINOPHEN 325 MG/1
650 TABLET ORAL
OUTPATIENT
Start: 2024-12-23

## 2024-12-16 RX ORDER — ACETAMINOPHEN 325 MG/1
650 TABLET ORAL ONCE
OUTPATIENT
Start: 2024-12-23 | End: 2024-12-23

## 2024-12-16 RX ORDER — HYDROCORTISONE SODIUM SUCCINATE 100 MG/2ML
100 INJECTION INTRAMUSCULAR; INTRAVENOUS
OUTPATIENT
Start: 2024-12-23

## 2024-12-16 RX ORDER — FAMOTIDINE 10 MG/ML
20 INJECTION, SOLUTION INTRAVENOUS
OUTPATIENT
Start: 2024-12-23

## 2024-12-16 RX ORDER — HEPARIN SODIUM (PORCINE) LOCK FLUSH IV SOLN 100 UNIT/ML 100 UNIT/ML
500 SOLUTION INTRAVENOUS PRN
OUTPATIENT
Start: 2024-12-23

## 2024-12-16 RX ORDER — ONDANSETRON 2 MG/ML
8 INJECTION INTRAMUSCULAR; INTRAVENOUS
OUTPATIENT
Start: 2024-12-23

## 2024-12-16 RX ORDER — DIPHENHYDRAMINE HYDROCHLORIDE 50 MG/ML
50 INJECTION INTRAMUSCULAR; INTRAVENOUS
OUTPATIENT
Start: 2024-12-23

## 2024-12-16 RX ORDER — SODIUM CHLORIDE 9 MG/ML
INJECTION, SOLUTION INTRAVENOUS CONTINUOUS
OUTPATIENT
Start: 2024-12-23

## 2024-12-16 RX ORDER — IBANDRONATE SODIUM 150 MG/1
TABLET, FILM COATED ORAL
COMMUNITY
Start: 2024-11-20

## 2024-12-16 RX ORDER — SODIUM CHLORIDE 0.9 % (FLUSH) 0.9 %
5-40 SYRINGE (ML) INJECTION PRN
OUTPATIENT
Start: 2024-12-23

## 2024-12-16 ASSESSMENT — PATIENT HEALTH QUESTIONNAIRE - PHQ9
2. FEELING DOWN, DEPRESSED OR HOPELESS: SEVERAL DAYS
SUM OF ALL RESPONSES TO PHQ QUESTIONS 1-9: 2
SUM OF ALL RESPONSES TO PHQ9 QUESTIONS 1 & 2: 2
SUM OF ALL RESPONSES TO PHQ QUESTIONS 1-9: 2
SUM OF ALL RESPONSES TO PHQ QUESTIONS 1-9: 2
1. LITTLE INTEREST OR PLEASURE IN DOING THINGS: SEVERAL DAYS
SUM OF ALL RESPONSES TO PHQ QUESTIONS 1-9: 2

## 2024-12-16 NOTE — PROGRESS NOTES
MA Rooming Questions  Patient: oSfia Bey  MRN: [id number]    Date: 12/16/2024        1. Do you have any new issues?   yes - c/o increased pain- back and left knee         2. Do you need any refills on medications?    no    3. Have you had any imaging done since your last visit?   no    4. Have you been hospitalized or seen in the emergency room since your last visit here?   no    5. Did the patient have a depression screening completed today? Yes    No data recorded     PHQ-9 Given to (if applicable):               PHQ-9 Score (if applicable):                     [] Positive     []  Negative              Does question #9 need addressed (if applicable)                     [] Yes    []  No               Isa Nelson CMA

## 2024-12-18 ENCOUNTER — TELEPHONE (OUTPATIENT)
Dept: INFUSION THERAPY | Age: 74
End: 2024-12-18

## 2024-12-23 ENCOUNTER — HOSPITAL ENCOUNTER (OUTPATIENT)
Dept: INFUSION THERAPY | Age: 74
Discharge: HOME | End: 2024-12-23
Payer: MEDICARE

## 2024-12-23 VITALS
BODY MASS INDEX: 34.93 KG/M2 | WEIGHT: 204.6 LBS | TEMPERATURE: 98.2 F | SYSTOLIC BLOOD PRESSURE: 148 MMHG | HEIGHT: 64 IN | DIASTOLIC BLOOD PRESSURE: 77 MMHG | HEART RATE: 93 BPM | OXYGEN SATURATION: 99 %

## 2024-12-23 DIAGNOSIS — K90.9 INTESTINAL MALABSORPTION, UNSPECIFIED TYPE: ICD-10-CM

## 2024-12-23 DIAGNOSIS — D50.0 IRON DEFICIENCY ANEMIA SECONDARY TO BLOOD LOSS (CHRONIC): Primary | ICD-10-CM

## 2024-12-23 PROCEDURE — 96365 THER/PROPH/DIAG IV INF INIT: CPT

## 2024-12-23 PROCEDURE — 6360000002 HC RX W HCPCS: Performed by: INTERNAL MEDICINE

## 2024-12-23 PROCEDURE — 96375 TX/PRO/DX INJ NEW DRUG ADDON: CPT

## 2024-12-23 PROCEDURE — 96366 THER/PROPH/DIAG IV INF ADDON: CPT

## 2024-12-23 PROCEDURE — 2580000003 HC RX 258: Performed by: INTERNAL MEDICINE

## 2024-12-23 PROCEDURE — 2500000003 HC RX 250 WO HCPCS: Performed by: INTERNAL MEDICINE

## 2024-12-23 PROCEDURE — 6370000000 HC RX 637 (ALT 250 FOR IP): Performed by: INTERNAL MEDICINE

## 2024-12-23 RX ORDER — ACETAMINOPHEN 325 MG/1
650 TABLET ORAL
OUTPATIENT
Start: 2024-12-23

## 2024-12-23 RX ORDER — SODIUM CHLORIDE 9 MG/ML
INJECTION, SOLUTION INTRAVENOUS CONTINUOUS
OUTPATIENT
Start: 2024-12-23

## 2024-12-23 RX ORDER — ACETAMINOPHEN 325 MG/1
650 TABLET ORAL ONCE
Status: CANCELLED | OUTPATIENT
Start: 2024-12-23 | End: 2024-12-23

## 2024-12-23 RX ORDER — HYDROCORTISONE SODIUM SUCCINATE 100 MG/2ML
100 INJECTION INTRAMUSCULAR; INTRAVENOUS
OUTPATIENT
Start: 2024-12-23

## 2024-12-23 RX ORDER — FAMOTIDINE 10 MG/ML
20 INJECTION, SOLUTION INTRAVENOUS
OUTPATIENT
Start: 2024-12-23

## 2024-12-23 RX ORDER — EPINEPHRINE 1 MG/ML
0.3 INJECTION, SOLUTION, CONCENTRATE INTRAVENOUS PRN
OUTPATIENT
Start: 2024-12-23

## 2024-12-23 RX ORDER — SODIUM CHLORIDE 9 MG/ML
5-250 INJECTION, SOLUTION INTRAVENOUS PRN
OUTPATIENT
Start: 2024-12-23

## 2024-12-23 RX ORDER — DIPHENHYDRAMINE HYDROCHLORIDE 50 MG/ML
50 INJECTION INTRAMUSCULAR; INTRAVENOUS
OUTPATIENT
Start: 2024-12-23

## 2024-12-23 RX ORDER — DEXAMETHASONE SODIUM PHOSPHATE 10 MG/ML
10 INJECTION INTRAMUSCULAR; INTRAVENOUS ONCE
Status: COMPLETED | OUTPATIENT
Start: 2024-12-23 | End: 2024-12-23

## 2024-12-23 RX ORDER — HEPARIN 100 UNIT/ML
500 SYRINGE INTRAVENOUS PRN
OUTPATIENT
Start: 2024-12-23

## 2024-12-23 RX ORDER — SODIUM CHLORIDE 9 MG/ML
5-250 INJECTION, SOLUTION INTRAVENOUS PRN
Status: DISCONTINUED | OUTPATIENT
Start: 2024-12-23 | End: 2024-12-24 | Stop reason: HOSPADM

## 2024-12-23 RX ORDER — MEPERIDINE HYDROCHLORIDE 25 MG/ML
12.5 INJECTION INTRAMUSCULAR; INTRAVENOUS; SUBCUTANEOUS PRN
OUTPATIENT
Start: 2024-12-23

## 2024-12-23 RX ORDER — ONDANSETRON 2 MG/ML
8 INJECTION INTRAMUSCULAR; INTRAVENOUS
OUTPATIENT
Start: 2024-12-23

## 2024-12-23 RX ORDER — SODIUM CHLORIDE 0.9 % (FLUSH) 0.9 %
5-40 SYRINGE (ML) INJECTION PRN
OUTPATIENT
Start: 2024-12-23

## 2024-12-23 RX ORDER — SODIUM CHLORIDE 9 MG/ML
5-250 INJECTION, SOLUTION INTRAVENOUS PRN
Status: CANCELLED | OUTPATIENT
Start: 2024-12-23

## 2024-12-23 RX ORDER — SODIUM CHLORIDE 0.9 % (FLUSH) 0.9 %
5-40 SYRINGE (ML) INJECTION PRN
Status: DISCONTINUED | OUTPATIENT
Start: 2024-12-23 | End: 2024-12-24 | Stop reason: HOSPADM

## 2024-12-23 RX ORDER — ALBUTEROL SULFATE 90 UG/1
4 INHALANT RESPIRATORY (INHALATION) PRN
OUTPATIENT
Start: 2024-12-23

## 2024-12-23 RX ORDER — DEXAMETHASONE SODIUM PHOSPHATE 10 MG/ML
10 INJECTION INTRAMUSCULAR; INTRAVENOUS ONCE
Status: CANCELLED | OUTPATIENT
Start: 2024-12-23 | End: 2024-12-23

## 2024-12-23 RX ORDER — ACETAMINOPHEN 325 MG/1
650 TABLET ORAL ONCE
Status: COMPLETED | OUTPATIENT
Start: 2024-12-23 | End: 2024-12-23

## 2024-12-23 RX ADMIN — SODIUM CHLORIDE 1000 MG: 9 INJECTION, SOLUTION INTRAVENOUS at 10:20

## 2024-12-23 RX ADMIN — SODIUM CHLORIDE, PRESERVATIVE FREE 20 ML: 5 INJECTION INTRAVENOUS at 13:47

## 2024-12-23 RX ADMIN — DEXAMETHASONE SODIUM PHOSPHATE 10 MG: 10 INJECTION INTRAMUSCULAR; INTRAVENOUS at 10:19

## 2024-12-23 RX ADMIN — ACETAMINOPHEN 650 MG: 325 TABLET ORAL at 10:13

## 2024-12-23 ASSESSMENT — PAIN SCALES - GENERAL: PAINLEVEL_OUTOF10: 0

## 2024-12-23 NOTE — PROGRESS NOTES
Patient arrived to treatment suite for Infed without test dose.  PIV started in left hand and good blood return noted.  Patient had iron 2-3 months ago, and has no questions or concerns for the doctor at this time.  Treatment approved and given.  30-minute post-infusion monitoring observed.  Patient tolerated well.  Left treatment suite ambulatory.  Discharge instructions provided.

## 2025-04-10 ENCOUNTER — OFFICE VISIT (OUTPATIENT)
Dept: ONCOLOGY | Age: 75
End: 2025-04-10
Payer: MEDICARE

## 2025-04-10 ENCOUNTER — HOSPITAL ENCOUNTER (OUTPATIENT)
Dept: INFUSION THERAPY | Age: 75
Discharge: HOME | End: 2025-04-10
Payer: MEDICARE

## 2025-04-10 VITALS
HEART RATE: 96 BPM | BODY MASS INDEX: 34.52 KG/M2 | OXYGEN SATURATION: 97 % | SYSTOLIC BLOOD PRESSURE: 174 MMHG | DIASTOLIC BLOOD PRESSURE: 77 MMHG | RESPIRATION RATE: 16 BRPM | WEIGHT: 202.2 LBS | HEIGHT: 64 IN | TEMPERATURE: 98.1 F

## 2025-04-10 DIAGNOSIS — D50.0 IRON DEFICIENCY ANEMIA SECONDARY TO BLOOD LOSS (CHRONIC): ICD-10-CM

## 2025-04-10 DIAGNOSIS — D64.9 ANEMIA, UNSPECIFIED TYPE: ICD-10-CM

## 2025-04-10 DIAGNOSIS — D64.9 ANEMIA, UNSPECIFIED TYPE: Primary | ICD-10-CM

## 2025-04-10 LAB
BASOPHILS # BLD: 0.02 K/UL
BASOPHILS NFR BLD: 0 % (ref 0–1)
EOSINOPHIL # BLD: 0.18 K/UL
EOSINOPHILS RELATIVE PERCENT: 3 % (ref 0–3)
ERYTHROCYTE [DISTWIDTH] IN BLOOD BY AUTOMATED COUNT: 14.4 % (ref 11.7–14.9)
FERRITIN SERPL-MCNC: 69 NG/ML (ref 15–150)
FOLATE SERPL-MCNC: 5.9 NG/ML (ref 4.8–24.2)
HCT VFR BLD AUTO: 44.7 % (ref 37–47)
HGB BLD-MCNC: 14.1 G/DL (ref 12.5–16)
IRON SATN MFR SERPL: 20 % (ref 15–50)
IRON SERPL-MCNC: 53 UG/DL (ref 37–145)
LYMPHOCYTES NFR BLD: 1.02 K/UL
LYMPHOCYTES RELATIVE PERCENT: 16 % (ref 24–44)
MCH RBC QN AUTO: 29.3 PG (ref 27–31)
MCHC RBC AUTO-ENTMCNC: 31.5 G/DL (ref 32–36)
MCV RBC AUTO: 92.9 FL (ref 78–100)
MONOCYTES NFR BLD: 0.46 K/UL
MONOCYTES NFR BLD: 7 % (ref 0–4)
NEUTROPHILS NFR BLD: 75 % (ref 36–66)
NEUTS SEG NFR BLD: 4.88 K/UL
PLATELET # BLD AUTO: 210 K/UL (ref 140–440)
PMV BLD AUTO: 12.1 FL (ref 7.5–11.1)
RBC # BLD AUTO: 4.81 M/UL (ref 4.2–5.4)
TIBC SERPL-MCNC: 259 UG/DL (ref 260–445)
UNSATURATED IRON BINDING CAPACITY: 206 UG/DL (ref 110–370)
VIT B12 SERPL-MCNC: 263 PG/ML (ref 211–911)
WBC OTHER # BLD: 6.6 K/UL (ref 4–10.5)

## 2025-04-10 PROCEDURE — 1036F TOBACCO NON-USER: CPT | Performed by: INTERNAL MEDICINE

## 2025-04-10 PROCEDURE — 82746 ASSAY OF FOLIC ACID SERUM: CPT

## 2025-04-10 PROCEDURE — 1159F MED LIST DOCD IN RCRD: CPT | Performed by: INTERNAL MEDICINE

## 2025-04-10 PROCEDURE — 82728 ASSAY OF FERRITIN: CPT

## 2025-04-10 PROCEDURE — 99213 OFFICE O/P EST LOW 20 MIN: CPT | Performed by: INTERNAL MEDICINE

## 2025-04-10 PROCEDURE — G8417 CALC BMI ABV UP PARAM F/U: HCPCS | Performed by: INTERNAL MEDICINE

## 2025-04-10 PROCEDURE — 83540 ASSAY OF IRON: CPT

## 2025-04-10 PROCEDURE — 82607 VITAMIN B-12: CPT

## 2025-04-10 PROCEDURE — 99212 OFFICE O/P EST SF 10 MIN: CPT

## 2025-04-10 PROCEDURE — 83550 IRON BINDING TEST: CPT

## 2025-04-10 PROCEDURE — 3017F COLORECTAL CA SCREEN DOC REV: CPT | Performed by: INTERNAL MEDICINE

## 2025-04-10 PROCEDURE — 85025 COMPLETE CBC W/AUTO DIFF WBC: CPT

## 2025-04-10 PROCEDURE — G8400 PT W/DXA NO RESULTS DOC: HCPCS | Performed by: INTERNAL MEDICINE

## 2025-04-10 PROCEDURE — 36415 COLL VENOUS BLD VENIPUNCTURE: CPT

## 2025-04-10 PROCEDURE — 1090F PRES/ABSN URINE INCON ASSESS: CPT | Performed by: INTERNAL MEDICINE

## 2025-04-10 PROCEDURE — G8427 DOCREV CUR MEDS BY ELIG CLIN: HCPCS | Performed by: INTERNAL MEDICINE

## 2025-04-10 PROCEDURE — 1123F ACP DISCUSS/DSCN MKR DOCD: CPT | Performed by: INTERNAL MEDICINE

## 2025-04-10 ASSESSMENT — PATIENT HEALTH QUESTIONNAIRE - PHQ9
1. LITTLE INTEREST OR PLEASURE IN DOING THINGS: SEVERAL DAYS
SUM OF ALL RESPONSES TO PHQ QUESTIONS 1-9: 2
2. FEELING DOWN, DEPRESSED OR HOPELESS: SEVERAL DAYS

## 2025-04-10 NOTE — PROGRESS NOTES
MA Rooming Questions  Patient: Sfoia Bey  MRN: [id number]    Date: 4/10/2025        1. Do you have any new issues?   Yes- fatigue, SOB     2. Do you need any refills on medications?    no    3. Have you had any imaging done since your last visit?   no    4. Have you been hospitalized or seen in the emergency room since your last visit here?   no    5. Did the patient have a depression screening completed today? Yes    No data recorded     PHQ-9 Given to (if applicable):               PHQ-9 Score (if applicable):                     [] Positive     []  Negative              Does question #9 need addressed (if applicable)                     [] Yes    []  No               Isa Nelson CMA      
colonoscopy in 2018. FU 7 years.   She was recommended to take meloxicam with food.    11/2022 folate 4.9, B-12 324. CBC grossly wnl. Ferritin 83, TIBC 231.  10/2022 mammogram benign.  4/2023 B-12 233, WBC 5.8, Hg 11.1, MCV 89.8. Plat 228. Ferritin 21, TIBC 347. B-12 233.  She stopped B-12 SL.  EGD in May 2022 with Gastro at [facility].  She takes meloxicam one a day. I recommend to limit meloxicam intake.    She resumed monthly B-12 injection at [facility] and had iron infusion in May 2023. Felt better.  6/6/2023 Folate 8.7, B-12 467. CBC wnl.  9/15/2023 folate 8.1, B-12 412, WBC 4.6, Hg 11.3, MCV 82.3, plast 223. Ferritin 21, TIBC 338.  She is on monthly B-12 injection  I recommend to stop Mobic and discuss with PCP about alternative.    10/9/2023 INFED.  12/13/2023 folate 4.7, B-12 429. CBC wnl. Ferritin 61, Iron 81, TIBC 297. Sat 27.   She has been taking prenatal vitamin.   She takes daily Meloxicam and understands about risk of ulcer and renal failure.  5/2/2024 folate 5.7, B-12 360.7. WBC 7.2, Hg 11.2, MCV 86, plat 244. Ferritin 18, Iron 23, TIBC 325, sat 7.  5/2024 INFED  7/8/2024 folate 5.3, B-12 370.6. CBC wnl. Ferritin 76. Iron 67, TIBC 245, sat 27.     8/2024 EGD with Dr Kelly, hiatal hernia. On PPI.   9/16/2024 CBC wnl. Ferritin 31, TIBC 312, iron 49, sat 16.  Advise to take iron pill as tolerated.  Recommend to monitor for bleeding.  On daily meloxicam.   She has chronic joint pain and fibromyalgia. She is on cymbalta.  She takes turmeric which helps the inflammation and pain.     11/2024 due for mammogram.    12/9/2024 folate 7.4, B-12 363. WBC 6.5, Hg 12.3, MCV 88.5, plat 258. Ferritin 21, TIBC 305, sat 9L, Iron 29L.  She has been feeling more tired lately.   Rec to cut back on Mobic. She is on turmeric.   Schedule for Iron infusion. She takes prenatal drop when she remembers.   Rec FU with Dr Robin KENNEDY.      4/10/25 FU visit.  [facility]     12/2024 INFED.  Recommend FU with Dr Robin KENNEDY

## 2025-04-16 ENCOUNTER — CLINICAL DOCUMENTATION (OUTPATIENT)
Dept: ONCOLOGY | Age: 75
End: 2025-04-16

## 2025-04-16 NOTE — PROGRESS NOTES
This nurse called the patient @ [phone] to review lab results. However there was no answer. This nurse left a VM for the patient advising that her labs are WNL and an iron infusion is not indicated at this time.

## 2025-05-11 NOTE — PROGRESS NOTES
Patient Name: Sofia Bey  Patient : 1950  Patient MRN: [id number]     Primary Oncologist: Elise Xie MD  Referring Provider: Claritza Johnson MD     Date of Service: 2025      Chief Complaint:   No chief complaint on file.    She came in for follow-up visit.     Problem List:      Iron deficiency anemia secondary to blood loss (chronic)     Vitamin B12 deficiency anemia     Personal history of other diseases of the digestive system     HPI:   Sofia Bey is a pleasant 75-year-old  female patient with an underlying history of anemia of iron deficiency, who was referred for consideration of iron infusion. She received parenteral iron in  without any problems. She was seen by Dr. Jeong previously and had colonoscopy and upper endoscopic study more than five years ago. She denied any melena or hematochezia. She received blood transfusion in 2017. At that time, hemoglobin was 7. Stool occult was several years back.   2017 CMP within normal limits. WBC 6.2, hemoglobin 7.2, hematocrit 25, MCV 59.2, platelet 300. Ferritin was 6, B12 266, TSH 2.28. She tried iron pills, which caused her to have irritation of the stomach and constipation.   2017 iron 21. BMP was within normal limits. White cell count was 5.4, hemoglobin 9.2, hematocrit 29.7, MCV 71.1, platelet 241, TSH 0.736. Ferritin was 9 and B12 was 258.   She had left cataract surgery on 2017.  She had right eye cataract surgeryIn 2018 and hematoma below the right eye. She received iron infusion in 2018 with mild improvement of hemoglobin and ferritin. Iron saturation remained low. She is on omeprazole for acid reflux. Blood tests in 2018 revealed hemoglobin 9.6 MCV 81.5 ferritin 38.  B12 borderline low therefore I started B12 injection SQ monthly. She could not tolerate iron by mouth.   2018 anemia corrected. She had upper, lower endoscopic study and capsule

## 2025-06-05 ENCOUNTER — HOSPITAL ENCOUNTER (OUTPATIENT)
Dept: INFUSION THERAPY | Age: 75
Discharge: HOME | End: 2025-06-05
Payer: MEDICARE

## 2025-06-05 DIAGNOSIS — D50.0 IRON DEFICIENCY ANEMIA SECONDARY TO BLOOD LOSS (CHRONIC): ICD-10-CM

## 2025-06-05 LAB
BASOPHILS # BLD: 0.02 K/UL
BASOPHILS NFR BLD: 0 % (ref 0–1)
EOSINOPHIL # BLD: 0.25 K/UL
EOSINOPHILS RELATIVE PERCENT: 4 % (ref 0–3)
ERYTHROCYTE [DISTWIDTH] IN BLOOD BY AUTOMATED COUNT: 13.6 % (ref 11.7–14.9)
FERRITIN SERPL-MCNC: 47 NG/ML (ref 15–150)
HCT VFR BLD AUTO: 46.1 % (ref 37–47)
HGB BLD-MCNC: 14.6 G/DL (ref 12.5–16)
IRON SATN MFR SERPL: 37 % (ref 15–50)
IRON SERPL-MCNC: 102 UG/DL (ref 37–145)
LYMPHOCYTES NFR BLD: 1.26 K/UL
LYMPHOCYTES RELATIVE PERCENT: 21 % (ref 24–44)
MCH RBC QN AUTO: 29.9 PG (ref 27–31)
MCHC RBC AUTO-ENTMCNC: 31.7 G/DL (ref 32–36)
MCV RBC AUTO: 94.3 FL (ref 78–100)
MONOCYTES NFR BLD: 0.56 K/UL
MONOCYTES NFR BLD: 9 % (ref 0–5)
NEUTROPHILS NFR BLD: 66 % (ref 36–66)
NEUTS SEG NFR BLD: 4.04 K/UL
PLATELET # BLD AUTO: 238 K/UL (ref 140–440)
PMV BLD AUTO: 11.6 FL (ref 7.5–11.1)
RBC # BLD AUTO: 4.89 M/UL (ref 4.2–5.4)
TIBC SERPL-MCNC: 276 UG/DL (ref 260–445)
UNSATURATED IRON BINDING CAPACITY: 174 UG/DL (ref 110–370)
WBC OTHER # BLD: 6.1 K/UL (ref 4–10.5)

## 2025-06-05 PROCEDURE — 83550 IRON BINDING TEST: CPT

## 2025-06-05 PROCEDURE — 85025 COMPLETE CBC W/AUTO DIFF WBC: CPT

## 2025-06-05 PROCEDURE — 36415 COLL VENOUS BLD VENIPUNCTURE: CPT

## 2025-06-05 PROCEDURE — 83540 ASSAY OF IRON: CPT

## 2025-06-05 PROCEDURE — 82728 ASSAY OF FERRITIN: CPT

## 2025-06-10 ENCOUNTER — OFFICE VISIT (OUTPATIENT)
Dept: ONCOLOGY | Age: 75
End: 2025-06-10
Payer: MEDICARE

## 2025-06-10 ENCOUNTER — HOSPITAL ENCOUNTER (OUTPATIENT)
Dept: INFUSION THERAPY | Age: 75
Discharge: HOME | End: 2025-06-10
Payer: MEDICARE

## 2025-06-10 VITALS
HEIGHT: 64 IN | TEMPERATURE: 98.2 F | OXYGEN SATURATION: 98 % | HEART RATE: 95 BPM | DIASTOLIC BLOOD PRESSURE: 76 MMHG | WEIGHT: 203 LBS | BODY MASS INDEX: 34.66 KG/M2 | SYSTOLIC BLOOD PRESSURE: 155 MMHG

## 2025-06-10 DIAGNOSIS — D64.9 ANEMIA, UNSPECIFIED TYPE: Primary | ICD-10-CM

## 2025-06-10 DIAGNOSIS — R52 PAIN: ICD-10-CM

## 2025-06-10 PROCEDURE — 1036F TOBACCO NON-USER: CPT | Performed by: INTERNAL MEDICINE

## 2025-06-10 PROCEDURE — 99213 OFFICE O/P EST LOW 20 MIN: CPT | Performed by: INTERNAL MEDICINE

## 2025-06-10 PROCEDURE — 3017F COLORECTAL CA SCREEN DOC REV: CPT | Performed by: INTERNAL MEDICINE

## 2025-06-10 PROCEDURE — 99212 OFFICE O/P EST SF 10 MIN: CPT

## 2025-06-10 PROCEDURE — 1125F AMNT PAIN NOTED PAIN PRSNT: CPT | Performed by: INTERNAL MEDICINE

## 2025-06-10 PROCEDURE — G8400 PT W/DXA NO RESULTS DOC: HCPCS | Performed by: INTERNAL MEDICINE

## 2025-06-10 PROCEDURE — G8427 DOCREV CUR MEDS BY ELIG CLIN: HCPCS | Performed by: INTERNAL MEDICINE

## 2025-06-10 PROCEDURE — G8417 CALC BMI ABV UP PARAM F/U: HCPCS | Performed by: INTERNAL MEDICINE

## 2025-06-10 PROCEDURE — 1123F ACP DISCUSS/DSCN MKR DOCD: CPT | Performed by: INTERNAL MEDICINE

## 2025-06-10 PROCEDURE — 1090F PRES/ABSN URINE INCON ASSESS: CPT | Performed by: INTERNAL MEDICINE

## 2025-06-10 PROCEDURE — 1159F MED LIST DOCD IN RCRD: CPT | Performed by: INTERNAL MEDICINE

## 2025-06-10 RX ORDER — LORATADINE 10 MG/1
10 TABLET ORAL DAILY
COMMUNITY
Start: 2025-04-08

## 2025-06-10 NOTE — PROGRESS NOTES
MA/LPN Rooming Questions  Patient: Sofia Bey  MRN: [id number]    Date: 6/10/2025        1. Do you have any new issues?   no         2. Do you need any refills on medications?    no    3. Have you had any imaging done since your last visit?   yes - labs 6/5    4. Have you been hospitalized or seen in the emergency room since your last visit here?   no    5. Did the patient have a depression screening completed today? No    No data recorded     PHQ-9 Given to (if applicable):               PHQ-9 Score (if applicable):                     [] Positive     []  Negative              Does question #9 need addressed (if applicable)                     [] Yes    []  No               Edith Steele CMA

## 2025-06-11 ENCOUNTER — HOSPITAL ENCOUNTER (OUTPATIENT)
Dept: RADIATION ONCOLOGY | Age: 75
Discharge: HOME | End: 2025-06-11
Payer: MEDICARE

## 2025-06-11 VITALS
SYSTOLIC BLOOD PRESSURE: 144 MMHG | DIASTOLIC BLOOD PRESSURE: 66 MMHG | HEART RATE: 94 BPM | WEIGHT: 204 LBS | OXYGEN SATURATION: 96 % | HEIGHT: 64 IN | BODY MASS INDEX: 34.83 KG/M2 | TEMPERATURE: 97.8 F

## 2025-06-11 DIAGNOSIS — M19.90 ARTHRITIS: ICD-10-CM

## 2025-06-11 DIAGNOSIS — M16.11 ARTHRITIS OF RIGHT HIP: Primary | ICD-10-CM

## 2025-06-11 PROCEDURE — 99202 OFFICE O/P NEW SF 15 MIN: CPT | Performed by: RADIOLOGY

## 2025-06-11 PROCEDURE — 99204 OFFICE O/P NEW MOD 45 MIN: CPT | Performed by: RADIOLOGY

## 2025-06-11 ASSESSMENT — PAIN SCALES - GENERAL: PAINLEVEL_OUTOF10: 7

## 2025-06-11 ASSESSMENT — PAIN DESCRIPTION - ORIENTATION: ORIENTATION: RIGHT;LEFT

## 2025-06-11 ASSESSMENT — PAIN DESCRIPTION - LOCATION: LOCATION: LEG;HIP

## 2025-06-11 NOTE — PLAN OF CARE
Radiation education and handouts given. Side effects and management reviewed with pt. All questions answered and pt voices understanding .    Pt given xray orders to hand carry to [facility], pt voiced will have completed today. Scheduled to return to [facility] for CT/SIM for radiation planning on 6/17/2025 at 1:00 PM, no prep ordered.     Nursing Care Plan for Radiation for Osteoarthritis    Pain related to disease process (Osteoarthritis).    Interventions   Pain assessment.   Monitor pharmacological pain medication.   Teach relaxation and repositioning techniques.     Expected Outcome   Maintain patient's acceptable pain level or <5 on pain scale.       Knowledge deficit related to diagnosis and treatment plan.    Interventions   Assess patient's ability to comprehend diagnosis and treatment plan.   Provide educational materials and teaching regarding plan of care.   Provide emotional support and continued education.   Refer to psychosocial coordinator if further assistance needed.     Expected Outcome   Patient voices understanding of diagnosis and treatment plan.       Altered skin integrity related to treatment.    Interventions   Evaluation of skin integrity at therapy site.   Advise patient on appropriate skin care.   Instruct patient on recommended lotions/ointments/creams/dressing changes to use on therapy site.     Expected Outcome   Minimize adverse skin reaction/infection at treatment site.       Altered musculo/skeletal/functional status.    Interventions   Assess range of motion and ability to perform ADL's.   Provide assistance when needed.   Referral for OT/PT evaluation and treat.     Expected Outcome   Patient to obtain/maintain highest potential functional level.       To re-evaluate weekly during radiation treatments.

## 2025-06-11 NOTE — CONSULTS
See HPI    PHYSICAL EXAMINATION:   VITAL SIGNS: BP (!) 144/66   Pulse 94   Temp 97.8 °F (36.6 °C) (Temporal)   Ht 1.626 m (5' 4\")   Wt 92.5 kg (204 lb)   SpO2 96%   BMI 35.02 kg/m²   ECOG PERFORMANCE STATUS: 0  PAIN RATIN/10 currently    GENERAL: Pleasant well-developed adult in no acute distress.  Alert and oriented ×3  SKIN: Warm and dry, without jaundice, ecchymoses, or petechiae.  HEENT: Normocephalic, atraumatic.  Pupils are round and symmetric. Sclerae anicteric  NECK:  No JVD; Supple. No cervical, supraclavicular, or infraclavicular lymphadenopathy is palpable.  LUNGS: Bilaterally clear to auscultation.  No rales, rhonci, or wheezing are present. Good inspiratory effort, no accessory muscle use.   CARDIAC: Regular rate and rhythm, without murmurs, clicks, or gallops   ABDOMEN: Normoactive bowels sounds are present.  Soft. Non-tender and non-distended.  No hepatosplenomegaly or masses are present.  EXTREMITIES: No cyanosis, clubbing or edema is present.  FROM  NEUROLOGIC:  The patient is alert and oriented.  CN II-XII are grossly intact.  Sensation to light touch is intact and symmetric in the fingers and feet.  Muscle strength is 5/5 in both the upper and lower extremities.  Musculoskeletal: No tenderness is elicited on palpation of the axial or appendicular skeleton currently.    IMPRESSION/PLAN:  Sofia Bey is a 75 y.o. female who has a history of osteoarthritis now with clinical progression in the right hip and bilateral knees.  I will obtain x-rays of these sites.  The treatment options were discussed in detail with the patient.  Should osteoarthritis changes be documented on imaging, I would recommend low-dose radiation therapy.  The potential acute side effects and chronic complications of radiation therapy to the right hip and bilateral knees were discussed in detail with the patient.  The patient voiced understanding, and the patient's questions were answered.  The patient has decided

## 2025-06-11 NOTE — PROGRESS NOTES
PamSofia TESFAYE Bey  6/11/2025    OFFICE VISIT FOR OSTEOARTHRITIS    Vitals:    06/11/25 0940   BP: (!) 144/66   Pulse: 94   Temp: 97.8 °F (36.6 °C)   SpO2: 96%        Oxygen Therapy  SpO2: 96 %  O2 Device: None (Room air)      FALL RISK:    Ambulatory aid device (15 pts) PATIENT AMBULATES WITH CANE   Fall risk    MEDIPORT: no    PACEMAKER/ICD: no    IMPLANTS: yes, CHIP IN LEFT BREAST 2005    PREVIOUS XRT: no        Past Medical History:   Diagnosis Date    Anemia     Fibromyalgia     Hyperlipidemia     Hypertension     Thyroid disease        Past Surgical History:   Procedure Laterality Date    CATARACT REMOVAL WITH IMPLANT      CHOLECYSTECTOMY      EYE SURGERY         Allergies   Allergen Reactions    Environmental/Seasonal      Itchy eyes, sneezing + congestion          Current Outpatient Medications:     loratadine (CLARITIN) 10 MG tablet, Take 1 tablet by mouth daily, Disp: , Rfl:     ibandronate (BONIVA) 150 MG tablet, , Disp: , Rfl:     simvastatin (ZOCOR) 40 MG tablet, , Disp: , Rfl:     levothyroxine (SYNTHROID) 112 MCG tablet, , Disp: , Rfl:     traMADol (ULTRAM) 50 MG tablet, Take 1 tablet by mouth every 6 hours as needed for Pain., Disp: , Rfl:     DULoxetine (CYMBALTA) 60 MG extended release capsule, , Disp: , Rfl:     Cholecalciferol (VITAMIN D3 PO), Take 500 tablets by mouth When she remembers this, Disp: , Rfl:     meloxicam (MOBIC) 7.5 MG tablet, daily, Disp: , Rfl:     omeprazole (PRILOSEC) 40 MG delayed release capsule, , Disp: , Rfl:     gabapentin (NEURONTIN) 300 MG capsule, Take 1 capsule by mouth 3 times daily. Takes 2 capsules each time, Disp: , Rfl:     lisinopril (PRINIVIL;ZESTRIL) 10 MG tablet, Take 1 tablet by mouth daily, Disp: , Rfl:         Electronically signed by Edith Steele CMA on 6/11/2025 at 9:42 AM

## 2025-06-17 ENCOUNTER — HOSPITAL ENCOUNTER (OUTPATIENT)
Dept: RADIATION ONCOLOGY | Age: 75
Discharge: HOME | End: 2025-06-17
Payer: MEDICARE

## 2025-06-17 PROCEDURE — 77470 SPECIAL RADIATION TREATMENT: CPT | Performed by: RADIOLOGY

## 2025-06-17 PROCEDURE — 77334 RADIATION TREATMENT AID(S): CPT | Performed by: RADIOLOGY

## 2025-06-20 PROCEDURE — 77334 RADIATION TREATMENT AID(S): CPT | Performed by: RADIOLOGY

## 2025-06-20 PROCEDURE — 77307 TELETHX ISODOSE PLAN CPLX: CPT | Performed by: RADIOLOGY

## 2025-07-01 ENCOUNTER — TELEPHONE (OUTPATIENT)
Dept: ONCOLOGY | Age: 75
End: 2025-07-01

## 2025-07-01 NOTE — TELEPHONE ENCOUNTER
Pt left voicemail this morning with just her information and not a reason for calling. I tried to call the pt back to see what she was needing but she did not answer so I left a voicemail on 7/1/25.

## 2025-07-07 ENCOUNTER — HOSPITAL ENCOUNTER (OUTPATIENT)
Dept: RADIATION ONCOLOGY | Age: 75
Discharge: HOME | End: 2025-07-07
Payer: MEDICARE

## 2025-07-07 PROCEDURE — 77412 RADIATION TX DELIVERY LVL 3: CPT | Performed by: RADIOLOGY

## 2025-07-07 PROCEDURE — 77280 THER RAD SIMULAJ FIELD SMPL: CPT | Performed by: RADIOLOGY

## 2025-07-08 ENCOUNTER — APPOINTMENT (OUTPATIENT)
Dept: RADIATION ONCOLOGY | Age: 75
End: 2025-07-08
Payer: MEDICARE

## 2025-07-08 ENCOUNTER — HOSPITAL ENCOUNTER (OUTPATIENT)
Dept: RADIATION ONCOLOGY | Age: 75
Discharge: HOME | End: 2025-07-08
Payer: MEDICARE

## 2025-07-09 ENCOUNTER — APPOINTMENT (OUTPATIENT)
Dept: RADIATION ONCOLOGY | Age: 75
End: 2025-07-09
Payer: MEDICARE

## 2025-07-09 ENCOUNTER — HOSPITAL ENCOUNTER (OUTPATIENT)
Dept: RADIATION ONCOLOGY | Age: 75
Discharge: HOME | End: 2025-07-09
Payer: MEDICARE

## 2025-07-09 PROCEDURE — 77412 RADIATION TX DELIVERY LVL 3: CPT | Performed by: RADIOLOGY

## 2025-07-10 ENCOUNTER — APPOINTMENT (OUTPATIENT)
Dept: RADIATION ONCOLOGY | Age: 75
End: 2025-07-10
Payer: MEDICARE

## 2025-07-10 ENCOUNTER — HOSPITAL ENCOUNTER (OUTPATIENT)
Dept: RADIATION ONCOLOGY | Age: 75
Discharge: HOME | End: 2025-07-10
Payer: MEDICARE

## 2025-07-10 PROCEDURE — 77412 RADIATION TX DELIVERY LVL 3: CPT | Performed by: RADIOLOGY

## 2025-07-11 ENCOUNTER — HOSPITAL ENCOUNTER (OUTPATIENT)
Dept: RADIATION ONCOLOGY | Age: 75
Discharge: HOME | End: 2025-07-11
Payer: MEDICARE

## 2025-07-11 ENCOUNTER — APPOINTMENT (OUTPATIENT)
Dept: RADIATION ONCOLOGY | Age: 75
End: 2025-07-11
Payer: MEDICARE

## 2025-07-11 PROCEDURE — 77412 RADIATION TX DELIVERY LVL 3: CPT | Performed by: RADIOLOGY

## 2025-07-14 ENCOUNTER — HOSPITAL ENCOUNTER (OUTPATIENT)
Dept: RADIATION ONCOLOGY | Age: 75
Discharge: HOME | End: 2025-07-14
Payer: MEDICARE

## 2025-07-14 ENCOUNTER — APPOINTMENT (OUTPATIENT)
Dept: RADIATION ONCOLOGY | Age: 75
End: 2025-07-14
Payer: MEDICARE

## 2025-07-14 PROCEDURE — 77427 RADIATION TX MANAGEMENT X5: CPT | Performed by: RADIOLOGY

## 2025-07-14 PROCEDURE — 77412 RADIATION TX DELIVERY LVL 3: CPT | Performed by: RADIOLOGY

## 2025-07-14 NOTE — PROGRESS NOTES
Weekly Radiation Treatment Progress Note    DATE OF SERVICE: 7/14/2025     DIAGNOSIS:  Cancer Staging   No matching staging information was found for the patient.       TREATMENT COURSE:   Oncology History    No history exists.         Site: Bilat Kness/ R Hip   Current Total Radiation Dose: 150 cGy    Pt doing well. Energy fairly good today, but was down over the weekend.  She does report some mild nausea over the weekend, now resolved.  She states she was able to walk around the house a little bit without the use of her cane which represents significant improvement.      EXAM  Wt Readings from Last 3 Encounters:   06/11/25 92.5 kg (204 lb)   06/10/25 92.1 kg (203 lb)   04/10/25 91.7 kg (202 lb 3.2 oz)     NAD      Setup images, chart, plan reviewed    A/P:   Tolerating RT well  Continue RT as planned  Return visit 3 months    Electronically signed by An Gallego MD on 7/14/2025 at 8:50 AM

## 2025-07-15 ENCOUNTER — HOSPITAL ENCOUNTER (OUTPATIENT)
Dept: RADIATION ONCOLOGY | Age: 75
Discharge: HOME | End: 2025-07-15
Payer: MEDICARE

## 2025-07-15 ENCOUNTER — APPOINTMENT (OUTPATIENT)
Dept: RADIATION ONCOLOGY | Age: 75
End: 2025-07-15
Payer: MEDICARE

## 2025-07-15 PROCEDURE — 77412 RADIATION TX DELIVERY LVL 3: CPT | Performed by: RADIOLOGY

## 2025-07-16 ENCOUNTER — APPOINTMENT (OUTPATIENT)
Dept: RADIATION ONCOLOGY | Age: 75
End: 2025-07-16
Payer: MEDICARE

## 2025-07-16 ENCOUNTER — HOSPITAL ENCOUNTER (OUTPATIENT)
Dept: RADIATION ONCOLOGY | Age: 75
End: 2025-07-16
Payer: MEDICARE

## 2025-07-17 ENCOUNTER — HOSPITAL ENCOUNTER (OUTPATIENT)
Dept: RADIATION ONCOLOGY | Age: 75
Discharge: HOME | End: 2025-07-17
Payer: MEDICARE

## 2025-07-17 ENCOUNTER — APPOINTMENT (OUTPATIENT)
Dept: RADIATION ONCOLOGY | Age: 75
End: 2025-07-17
Payer: MEDICARE

## 2025-07-17 PROCEDURE — 77336 RADIATION PHYSICS CONSULT: CPT | Performed by: RADIOLOGY

## 2025-07-17 PROCEDURE — 77412 RADIATION TX DELIVERY LVL 3: CPT | Performed by: RADIOLOGY

## 2025-07-18 ENCOUNTER — HOSPITAL ENCOUNTER (OUTPATIENT)
Dept: RADIATION ONCOLOGY | Age: 75
Discharge: HOME | End: 2025-07-18
Payer: MEDICARE

## 2025-07-18 ENCOUNTER — APPOINTMENT (OUTPATIENT)
Dept: RADIATION ONCOLOGY | Age: 75
End: 2025-07-18
Payer: MEDICARE

## 2025-07-18 PROCEDURE — 77412 RADIATION TX DELIVERY LVL 3: CPT | Performed by: RADIOLOGY

## 2025-07-21 ENCOUNTER — APPOINTMENT (OUTPATIENT)
Dept: RADIATION ONCOLOGY | Age: 75
End: 2025-07-21
Payer: MEDICARE

## 2025-07-21 ENCOUNTER — HOSPITAL ENCOUNTER (OUTPATIENT)
Dept: RADIATION ONCOLOGY | Age: 75
Discharge: HOME | End: 2025-07-21
Payer: MEDICARE

## 2025-07-21 PROCEDURE — 77412 RADIATION TX DELIVERY LVL 3: CPT | Performed by: RADIOLOGY

## 2025-07-22 ENCOUNTER — HOSPITAL ENCOUNTER (OUTPATIENT)
Dept: RADIATION ONCOLOGY | Age: 75
Discharge: HOME | End: 2025-07-22
Payer: MEDICARE

## 2025-07-22 ENCOUNTER — APPOINTMENT (OUTPATIENT)
Dept: RADIATION ONCOLOGY | Age: 75
End: 2025-07-22
Payer: MEDICARE

## 2025-07-22 PROCEDURE — 77412 RADIATION TX DELIVERY LVL 3: CPT | Performed by: RADIOLOGY

## 2025-07-23 ENCOUNTER — APPOINTMENT (OUTPATIENT)
Dept: RADIATION ONCOLOGY | Age: 75
End: 2025-07-23
Payer: MEDICARE

## 2025-07-23 ENCOUNTER — HOSPITAL ENCOUNTER (OUTPATIENT)
Dept: RADIATION ONCOLOGY | Age: 75
Discharge: HOME | End: 2025-07-23
Payer: MEDICARE

## 2025-07-23 PROCEDURE — 77336 RADIATION PHYSICS CONSULT: CPT | Performed by: RADIOLOGY

## 2025-07-24 ENCOUNTER — APPOINTMENT (OUTPATIENT)
Dept: RADIATION ONCOLOGY | Age: 75
End: 2025-07-24
Payer: MEDICARE

## 2025-07-25 ENCOUNTER — APPOINTMENT (OUTPATIENT)
Dept: RADIATION ONCOLOGY | Age: 75
End: 2025-07-25
Payer: MEDICARE

## 2025-07-28 ENCOUNTER — APPOINTMENT (OUTPATIENT)
Dept: RADIATION ONCOLOGY | Age: 75
End: 2025-07-28
Payer: MEDICARE

## 2025-07-29 ENCOUNTER — APPOINTMENT (OUTPATIENT)
Dept: RADIATION ONCOLOGY | Age: 75
End: 2025-07-29
Payer: MEDICARE

## 2025-07-30 ENCOUNTER — APPOINTMENT (OUTPATIENT)
Dept: RADIATION ONCOLOGY | Age: 75
End: 2025-07-30
Payer: MEDICARE

## 2025-07-31 ENCOUNTER — APPOINTMENT (OUTPATIENT)
Dept: RADIATION ONCOLOGY | Age: 75
End: 2025-07-31
Payer: MEDICARE